# Patient Record
Sex: MALE | Race: WHITE | NOT HISPANIC OR LATINO | Employment: OTHER | URBAN - METROPOLITAN AREA
[De-identification: names, ages, dates, MRNs, and addresses within clinical notes are randomized per-mention and may not be internally consistent; named-entity substitution may affect disease eponyms.]

---

## 2017-07-17 RX ORDER — ATENOLOL 50 MG/1
TABLET ORAL
Qty: 90 TABLET | Refills: 1 | Status: SHIPPED | OUTPATIENT
Start: 2017-07-17 | End: 2017-10-16

## 2017-07-31 RX ORDER — ATENOLOL 50 MG/1
TABLET ORAL
Qty: 90 TABLET | Refills: 1 | Status: SHIPPED | OUTPATIENT
Start: 2017-07-31 | End: 2017-10-13 | Stop reason: SDUPTHER

## 2017-09-27 ENCOUNTER — TELEPHONE (OUTPATIENT)
Dept: FAMILY MEDICINE CLINIC | Facility: CLINIC | Age: 62
End: 2017-09-27

## 2017-09-27 DIAGNOSIS — G47.30 SLEEP APNEA, UNSPECIFIED TYPE: Primary | ICD-10-CM

## 2017-09-27 NOTE — TELEPHONE ENCOUNTER
Patient left message stating he was given a CPAP machine last year through [facility]. He has been snoring while sleeping and when he called [facility] they said his pressure settings were probably too low and that gerhard needs to send an order to [facility] to increase the pressure settings

## 2017-10-13 ENCOUNTER — HOSPITAL ENCOUNTER (OUTPATIENT)
Dept: CARDIOLOGY | Facility: HOSPITAL | Age: 62
Discharge: HOME | End: 2017-10-13

## 2017-10-13 ENCOUNTER — HOSPITAL ENCOUNTER (OUTPATIENT)
Dept: CARDIOLOGY | Facility: HOSPITAL | Age: 62
Discharge: HOME | End: 2017-10-13
Admitting: NURSE PRACTITIONER

## 2017-10-13 ENCOUNTER — OFFICE VISIT (OUTPATIENT)
Dept: FAMILY MEDICINE CLINIC | Facility: CLINIC | Age: 62
End: 2017-10-13

## 2017-10-13 VITALS
HEART RATE: 106 BPM | SYSTOLIC BLOOD PRESSURE: 118 MMHG | BODY MASS INDEX: 39.55 KG/M2 | WEIGHT: 292 LBS | OXYGEN SATURATION: 98 % | DIASTOLIC BLOOD PRESSURE: 70 MMHG | HEIGHT: 72 IN

## 2017-10-13 VITALS
TEMPERATURE: 98.2 F | HEART RATE: 71 BPM | WEIGHT: 292 LBS | BODY MASS INDEX: 39.55 KG/M2 | HEIGHT: 72 IN | RESPIRATION RATE: 14 BRPM | SYSTOLIC BLOOD PRESSURE: 132 MMHG | DIASTOLIC BLOOD PRESSURE: 74 MMHG | OXYGEN SATURATION: 100 %

## 2017-10-13 DIAGNOSIS — I48.91 ATRIAL FIBRILLATION, UNSPECIFIED TYPE (HCC): ICD-10-CM

## 2017-10-13 DIAGNOSIS — R94.31 ABNORMAL EKG: ICD-10-CM

## 2017-10-13 DIAGNOSIS — R06.02 SHORTNESS OF BREATH: ICD-10-CM

## 2017-10-13 DIAGNOSIS — G47.33 OSA (OBSTRUCTIVE SLEEP APNEA): ICD-10-CM

## 2017-10-13 DIAGNOSIS — I48.91 ATRIAL FIBRILLATION, UNSPECIFIED TYPE (HCC): Primary | ICD-10-CM

## 2017-10-13 DIAGNOSIS — R06.02 SOB (SHORTNESS OF BREATH): Primary | ICD-10-CM

## 2017-10-13 DIAGNOSIS — R42 DIZZINESS: ICD-10-CM

## 2017-10-13 DIAGNOSIS — R11.0 NAUSEA: ICD-10-CM

## 2017-10-13 DIAGNOSIS — E66.09 CLASS 2 OBESITY DUE TO EXCESS CALORIES WITHOUT SERIOUS COMORBIDITY WITH BODY MASS INDEX (BMI) OF 39.0 TO 39.9 IN ADULT: ICD-10-CM

## 2017-10-13 PROBLEM — M54.12 CERVICAL RADICULOPATHY: Status: ACTIVE | Noted: 2017-05-20

## 2017-10-13 PROBLEM — M50.30 DDD (DEGENERATIVE DISC DISEASE), CERVICAL: Status: ACTIVE | Noted: 2017-07-07

## 2017-10-13 PROBLEM — M48.02 CERVICAL STENOSIS OF SPINE: Status: ACTIVE | Noted: 2017-05-24

## 2017-10-13 LAB
ALBUMIN SERPL-MCNC: 4.1 G/DL (ref 3.5–5.2)
ALBUMIN/GLOB SERPL: 1.3 G/DL
ALP SERPL-CCNC: 84 U/L (ref 39–117)
ALT SERPL W P-5'-P-CCNC: 98 U/L (ref 1–41)
ANION GAP SERPL CALCULATED.3IONS-SCNC: 12.2 MMOL/L
ASCENDING AORTA: 3.8 CM
AST SERPL-CCNC: 39 U/L (ref 1–40)
BASOPHILS # BLD AUTO: 0.15 10*3/MM3 (ref 0–0.2)
BASOPHILS NFR BLD AUTO: 1.9 % (ref 0–1.5)
BH CV ECHO MEAS - ACS: 2.3 CM
BH CV ECHO MEAS - AI DEC SLOPE: 182.4 CM/SEC^2
BH CV ECHO MEAS - AI MAX PG: 32.2 MMHG
BH CV ECHO MEAS - AI MAX VEL: 283.7 CM/SEC
BH CV ECHO MEAS - AI P1/2T: 455.5 MSEC
BH CV ECHO MEAS - AO MAX PG (FULL): 6.5 MMHG
BH CV ECHO MEAS - AO MAX PG: 9.2 MMHG
BH CV ECHO MEAS - AO MEAN PG (FULL): 3.5 MMHG
BH CV ECHO MEAS - AO MEAN PG: 5 MMHG
BH CV ECHO MEAS - AO ROOT AREA (BSA CORRECTED): 1.3
BH CV ECHO MEAS - AO ROOT AREA: 7.8 CM^2
BH CV ECHO MEAS - AO ROOT DIAM: 3.1 CM
BH CV ECHO MEAS - AO V2 MAX: 152 CM/SEC
BH CV ECHO MEAS - AO V2 MEAN: 107.3 CM/SEC
BH CV ECHO MEAS - AO V2 VTI: 28.3 CM
BH CV ECHO MEAS - AVA(I,A): 2.4 CM^2
BH CV ECHO MEAS - AVA(I,D): 2.4 CM^2
BH CV ECHO MEAS - AVA(V,A): 2.2 CM^2
BH CV ECHO MEAS - AVA(V,D): 2.2 CM^2
BH CV ECHO MEAS - BSA(HAYCOCK): 2.6 M^2
BH CV ECHO MEAS - BSA: 2.5 M^2
BH CV ECHO MEAS - BZI_BMI: 39.6 KILOGRAMS/M^2
BH CV ECHO MEAS - BZI_METRIC_HEIGHT: 182.9 CM
BH CV ECHO MEAS - BZI_METRIC_WEIGHT: 132.5 KG
BH CV ECHO MEAS - CONTRAST EF (2CH): 59.9 ML/M^2
BH CV ECHO MEAS - CONTRAST EF 4CH: 62 ML/M^2
BH CV ECHO MEAS - EDV(MOD-SP2): 147 ML
BH CV ECHO MEAS - EDV(MOD-SP4): 150 ML
BH CV ECHO MEAS - EDV(TEICH): 161 ML
BH CV ECHO MEAS - EF(CUBED): 59.2 %
BH CV ECHO MEAS - EF(MOD-SP2): 59.9 %
BH CV ECHO MEAS - EF(MOD-SP4): 62 %
BH CV ECHO MEAS - EF(TEICH): 50.1 %
BH CV ECHO MEAS - ESV(MOD-SP2): 59 ML
BH CV ECHO MEAS - ESV(MOD-SP4): 57 ML
BH CV ECHO MEAS - ESV(TEICH): 80.4 ML
BH CV ECHO MEAS - FS: 25.8 %
BH CV ECHO MEAS - IVS/LVPW: 1.1
BH CV ECHO MEAS - IVSD: 1.5 CM
BH CV ECHO MEAS - LAT PEAK E' VEL: 10 CM/SEC
BH CV ECHO MEAS - LV DIASTOLIC VOL/BSA (35-75): 59.9 ML/M^2
BH CV ECHO MEAS - LV MASS(C)D: 378 GRAMS
BH CV ECHO MEAS - LV MASS(C)DI: 151.1 GRAMS/M^2
BH CV ECHO MEAS - LV MAX PG: 2.8 MMHG
BH CV ECHO MEAS - LV MEAN PG: 1.5 MMHG
BH CV ECHO MEAS - LV SYSTOLIC VOL/BSA (12-30): 22.8 ML/M^2
BH CV ECHO MEAS - LV V1 MAX: 82.9 CM/SEC
BH CV ECHO MEAS - LV V1 MEAN: 57.8 CM/SEC
BH CV ECHO MEAS - LV V1 VTI: 17.3 CM
BH CV ECHO MEAS - LVIDD: 5.7 CM
BH CV ECHO MEAS - LVIDS: 4.2 CM
BH CV ECHO MEAS - LVLD AP2: 9 CM
BH CV ECHO MEAS - LVLD AP4: 8.3 CM
BH CV ECHO MEAS - LVLS AP2: 7.7 CM
BH CV ECHO MEAS - LVLS AP4: 7.1 CM
BH CV ECHO MEAS - LVOT AREA (M): 4.2 CM^2
BH CV ECHO MEAS - LVOT AREA: 4 CM^2
BH CV ECHO MEAS - LVOT DIAM: 2.3 CM
BH CV ECHO MEAS - LVPWD: 1.4 CM
BH CV ECHO MEAS - MED PEAK E' VEL: 7 CM/SEC
BH CV ECHO MEAS - MR MAX PG: 49.1 MMHG
BH CV ECHO MEAS - MR MAX VEL: 350.2 CM/SEC
BH CV ECHO MEAS - MV DEC SLOPE: 1254 CM/SEC^2
BH CV ECHO MEAS - MV DEC TIME: 0.11 SEC
BH CV ECHO MEAS - MV E MAX VEL: 126.1 CM/SEC
BH CV ECHO MEAS - MV MAX PG: 3.1 MMHG
BH CV ECHO MEAS - MV MEAN PG: 1.5 MMHG
BH CV ECHO MEAS - MV P1/2T MAX VEL: 127.1 CM/SEC
BH CV ECHO MEAS - MV P1/2T: 29.7 MSEC
BH CV ECHO MEAS - MV V2 MAX: 88.2 CM/SEC
BH CV ECHO MEAS - MV V2 MEAN: 56.9 CM/SEC
BH CV ECHO MEAS - MV V2 VTI: 27.4 CM
BH CV ECHO MEAS - MVA P1/2T LCG: 1.7 CM^2
BH CV ECHO MEAS - MVA(P1/2T): 7.4 CM^2
BH CV ECHO MEAS - MVA(VTI): 2.5 CM^2
BH CV ECHO MEAS - PA ACC TIME: 0.08 SEC
BH CV ECHO MEAS - PA MAX PG (FULL): 4.6 MMHG
BH CV ECHO MEAS - PA MAX PG: 5.8 MMHG
BH CV ECHO MEAS - PA PR(ACCEL): 41 MMHG
BH CV ECHO MEAS - PA V2 MAX: 120.8 CM/SEC
BH CV ECHO MEAS - PULM A REVS DUR: 0.08 SEC
BH CV ECHO MEAS - PULM A REVS VEL: 19.8 CM/SEC
BH CV ECHO MEAS - PULM DIAS VEL: 39.2 CM/SEC
BH CV ECHO MEAS - PULM S/D: 1.3
BH CV ECHO MEAS - PULM SYS VEL: 50.6 CM/SEC
BH CV ECHO MEAS - PVA(V,A): 2 CM^2
BH CV ECHO MEAS - PVA(V,D): 2 CM^2
BH CV ECHO MEAS - QP/QS: 0.7
BH CV ECHO MEAS - RV MAX PG: 1.2 MMHG
BH CV ECHO MEAS - RV MEAN PG: 0.57 MMHG
BH CV ECHO MEAS - RV V1 MAX: 54.8 CM/SEC
BH CV ECHO MEAS - RV V1 MEAN: 35 CM/SEC
BH CV ECHO MEAS - RV V1 VTI: 10.7 CM
BH CV ECHO MEAS - RVOT AREA: 4.5 CM^2
BH CV ECHO MEAS - RVOT DIAM: 2.4 CM
BH CV ECHO MEAS - SI(AO): 88 ML/M^2
BH CV ECHO MEAS - SI(CUBED): 44.1 ML/M^2
BH CV ECHO MEAS - SI(LVOT): 27.5 ML/M^2
BH CV ECHO MEAS - SI(MOD-SP2): 35.2 ML/M^2
BH CV ECHO MEAS - SI(MOD-SP4): 37.2 ML/M^2
BH CV ECHO MEAS - SI(TEICH): 32.2 ML/M^2
BH CV ECHO MEAS - SUP REN AO DIAM: 2 CM
BH CV ECHO MEAS - SV(AO): 220.2 ML
BH CV ECHO MEAS - SV(CUBED): 110.5 ML
BH CV ECHO MEAS - SV(LVOT): 68.8 ML
BH CV ECHO MEAS - SV(MOD-SP2): 88 ML
BH CV ECHO MEAS - SV(MOD-SP4): 93 ML
BH CV ECHO MEAS - SV(RVOT): 47.8 ML
BH CV ECHO MEAS - SV(TEICH): 80.7 ML
BH CV ECHO MEAS - TAPSE (>1.6): 1.9 CM2
BH CV ECHO MEAS - TR MAX VEL: 176.5 CM/SEC
BH CV XLRA - RV BASE: 3.1 CM
BH CV XLRA - TDI S': 13 CM/SEC
BILIRUB SERPL-MCNC: 0.5 MG/DL (ref 0.1–1.2)
BUN BLD-MCNC: 20 MG/DL (ref 8–23)
BUN/CREAT SERPL: 16.9 (ref 7–25)
CALCIUM SPEC-SCNC: 10.1 MG/DL (ref 8.6–10.5)
CHLORIDE SERPL-SCNC: 102 MMOL/L (ref 98–107)
CO2 SERPL-SCNC: 24.8 MMOL/L (ref 22–29)
CREAT BLD-MCNC: 1.18 MG/DL (ref 0.76–1.27)
D DIMER PPP FEU-MCNC: 0.31 MCGFEU/ML (ref 0–0.49)
DEPRECATED RDW RBC AUTO: 44.3 FL (ref 37–54)
E/E' RATIO: 15.5
EOSINOPHIL # BLD AUTO: 0.18 10*3/MM3 (ref 0–0.7)
EOSINOPHIL NFR BLD AUTO: 2.3 % (ref 0.3–6.2)
ERYTHROCYTE [DISTWIDTH] IN BLOOD BY AUTOMATED COUNT: 13.1 % (ref 11.5–14.5)
GFR SERPL CREATININE-BSD FRML MDRD: 62 ML/MIN/1.73
GLOBULIN UR ELPH-MCNC: 3.2 GM/DL
GLUCOSE BLD-MCNC: 112 MG/DL (ref 65–99)
HCT VFR BLD AUTO: 48.3 % (ref 40.4–52.2)
HGB BLD-MCNC: 16 G/DL (ref 13.7–17.6)
IMM GRANULOCYTES # BLD: 0.02 10*3/MM3 (ref 0–0.03)
IMM GRANULOCYTES NFR BLD: 0.3 % (ref 0–0.5)
LEFT ATRIUM VOLUME INDEX: 19 ML/M2
LV EF 2D ECHO EST: 62 %
LYMPHOCYTES # BLD AUTO: 2.99 10*3/MM3 (ref 0.9–4.8)
LYMPHOCYTES NFR BLD AUTO: 37.6 % (ref 19.6–45.3)
MCH RBC QN AUTO: 30.8 PG (ref 27–32.7)
MCHC RBC AUTO-ENTMCNC: 33.1 G/DL (ref 32.6–36.4)
MCV RBC AUTO: 92.9 FL (ref 79.8–96.2)
MONOCYTES # BLD AUTO: 0.68 10*3/MM3 (ref 0.2–1.2)
MONOCYTES NFR BLD AUTO: 8.6 % (ref 5–12)
NEUTROPHILS # BLD AUTO: 3.93 10*3/MM3 (ref 1.9–8.1)
NEUTROPHILS NFR BLD AUTO: 49.3 % (ref 42.7–76)
NT-PROBNP SERPL-MCNC: 1281 PG/ML (ref 0–900)
PLATELET # BLD AUTO: 203 10*3/MM3 (ref 140–500)
PMV BLD AUTO: 11.9 FL (ref 6–12)
POTASSIUM BLD-SCNC: 4.5 MMOL/L (ref 3.5–5.2)
PROT SERPL-MCNC: 7.3 G/DL (ref 6–8.5)
RBC # BLD AUTO: 5.2 10*6/MM3 (ref 4.6–6)
SINUS: 2.5 CM
SODIUM BLD-SCNC: 139 MMOL/L (ref 136–145)
STJ: 2.7 CM
T-UPTAKE NFR SERPL: 1.02 TBI (ref 0.8–1.3)
T4 FREE SERPL-MCNC: 1.09 NG/DL (ref 0.93–1.7)
T4 SERPL-MCNC: 5.97 MCG/DL (ref 4.5–11.7)
TROPONIN T SERPL-MCNC: <0.01 NG/ML (ref 0–0.03)
TSH SERPL DL<=0.05 MIU/L-ACNC: 2.16 MIU/ML (ref 0.27–4.2)
WBC NRBC COR # BLD: 7.95 10*3/MM3 (ref 4.5–10.7)

## 2017-10-13 PROCEDURE — 85025 COMPLETE CBC W/AUTO DIFF WBC: CPT | Performed by: NURSE PRACTITIONER

## 2017-10-13 PROCEDURE — 99214 OFFICE O/P EST MOD 30 MIN: CPT | Performed by: NURSE PRACTITIONER

## 2017-10-13 PROCEDURE — 96374 THER/PROPH/DIAG INJ IV PUSH: CPT

## 2017-10-13 PROCEDURE — 96376 TX/PRO/DX INJ SAME DRUG ADON: CPT

## 2017-10-13 PROCEDURE — 84484 ASSAY OF TROPONIN QUANT: CPT | Performed by: NURSE PRACTITIONER

## 2017-10-13 PROCEDURE — 80053 COMPREHEN METABOLIC PANEL: CPT | Performed by: NURSE PRACTITIONER

## 2017-10-13 PROCEDURE — 84436 ASSAY OF TOTAL THYROXINE: CPT | Performed by: NURSE PRACTITIONER

## 2017-10-13 PROCEDURE — 93000 ELECTROCARDIOGRAM COMPLETE: CPT | Performed by: NURSE PRACTITIONER

## 2017-10-13 PROCEDURE — 93306 TTE W/DOPPLER COMPLETE: CPT

## 2017-10-13 PROCEDURE — 93005 ELECTROCARDIOGRAM TRACING: CPT | Performed by: NURSE PRACTITIONER

## 2017-10-13 PROCEDURE — 94760 N-INVAS EAR/PLS OXIMETRY 1: CPT

## 2017-10-13 PROCEDURE — 25010000002 FUROSEMIDE PER 20 MG: Performed by: NURSE PRACTITIONER

## 2017-10-13 PROCEDURE — 84479 ASSAY OF THYROID (T3 OR T4): CPT | Performed by: NURSE PRACTITIONER

## 2017-10-13 PROCEDURE — 84439 ASSAY OF FREE THYROXINE: CPT | Performed by: NURSE PRACTITIONER

## 2017-10-13 PROCEDURE — 84443 ASSAY THYROID STIM HORMONE: CPT | Performed by: NURSE PRACTITIONER

## 2017-10-13 PROCEDURE — 93306 TTE W/DOPPLER COMPLETE: CPT | Performed by: INTERNAL MEDICINE

## 2017-10-13 PROCEDURE — 83880 ASSAY OF NATRIURETIC PEPTIDE: CPT | Performed by: NURSE PRACTITIONER

## 2017-10-13 PROCEDURE — 85379 FIBRIN DEGRADATION QUANT: CPT | Performed by: NURSE PRACTITIONER

## 2017-10-13 PROCEDURE — 36415 COLL VENOUS BLD VENIPUNCTURE: CPT

## 2017-10-13 PROCEDURE — 99205 OFFICE O/P NEW HI 60 MIN: CPT | Performed by: NURSE PRACTITIONER

## 2017-10-13 PROCEDURE — 93010 ELECTROCARDIOGRAM REPORT: CPT | Performed by: NURSE PRACTITIONER

## 2017-10-13 PROCEDURE — 25010000002 PERFLUTREN (DEFINITY) 8.476 MG IN SODIUM CHLORIDE 0.9 % 10 ML INJECTION: Performed by: NURSE PRACTITIONER

## 2017-10-13 RX ORDER — FUROSEMIDE 20 MG/1
20 TABLET ORAL DAILY
Qty: 30 TABLET | Refills: 1 | Status: SHIPPED | OUTPATIENT
Start: 2017-10-13 | End: 2017-11-10

## 2017-10-13 RX ORDER — SODIUM CHLORIDE 0.9 % (FLUSH) 0.9 %
10 SYRINGE (ML) INJECTION AS NEEDED
Status: DISCONTINUED | OUTPATIENT
Start: 2017-10-13 | End: 2017-10-13

## 2017-10-13 RX ORDER — DILTIAZEM HYDROCHLORIDE 240 MG/1
240 CAPSULE, COATED, EXTENDED RELEASE ORAL DAILY
Qty: 30 CAPSULE | Refills: 1 | Status: SHIPPED | OUTPATIENT
Start: 2017-10-13 | End: 2017-11-10 | Stop reason: SDUPTHER

## 2017-10-13 RX ORDER — NITROGLYCERIN 0.4 MG/1
0.4 TABLET SUBLINGUAL
Status: DISCONTINUED | OUTPATIENT
Start: 2017-10-13 | End: 2017-10-13

## 2017-10-13 RX ORDER — DILTIAZEM HYDROCHLORIDE 5 MG/ML
10 INJECTION INTRAVENOUS ONCE
Status: COMPLETED | OUTPATIENT
Start: 2017-10-13 | End: 2017-10-13

## 2017-10-13 RX ORDER — FUROSEMIDE 10 MG/ML
20 INJECTION INTRAMUSCULAR; INTRAVENOUS ONCE
Status: COMPLETED | OUTPATIENT
Start: 2017-10-13 | End: 2017-10-13

## 2017-10-13 RX ADMIN — PERFLUTREN 1.5 ML: 6.52 INJECTION, SUSPENSION INTRAVENOUS at 14:13

## 2017-10-13 RX ADMIN — METOROPROLOL TARTRATE 5 MG: 5 INJECTION, SOLUTION INTRAVENOUS at 13:07

## 2017-10-13 RX ADMIN — FUROSEMIDE 20 MG: 10 INJECTION, SOLUTION INTRAMUSCULAR; INTRAVENOUS at 15:03

## 2017-10-13 RX ADMIN — DILTIAZEM HYDROCHLORIDE 10 MG: 5 INJECTION INTRAVENOUS at 15:20

## 2017-10-13 NOTE — PROGRESS NOTES
"Subjective   Hoang So is a 62 y.o. male.     History of Present Illness   Patient presenting to the office today with a 2 day onset of shortness of breath and nausea.  He is not experiencing any chest pain that he notices anytime he walks up hill or even walks on flat land for about 10-15 feet he's getting extremely short of breath.  He has a large cardiac history in his family including multiple heart attacks in his father and uncles.  Both of his parents have A. fib.  He is nauseated today and the nausea has been coming and going.  He's also been dizzy when he moves from sitting to standing or laying to standing.  The dizziness lasts for a few seconds and then I will go away.  The following portions of the patient's history were reviewed and updated as appropriate: allergies, current medications, past social history and problem list.    Review of Systems   Respiratory: Positive for shortness of breath.    Neurological: Positive for dizziness and headaches.   All other systems reviewed and are negative.      Objective   /74 (BP Location: Left arm, Patient Position: Sitting, Cuff Size: Adult)  Pulse 71  Temp 98.2 °F (36.8 °C) (Oral)   Resp 14  Ht 72\" (182.9 cm)  Wt 292 lb (132 kg)  SpO2 100%  BMI 39.6 kg/m2  Physical Exam   Constitutional: He is oriented to person, place, and time. Vital signs are normal. He appears well-developed and well-nourished. No distress.   HENT:   Head: Normocephalic.   Cardiovascular: Normal heart sounds.  An irregularly irregular rhythm present. Tachycardia present.    Pulmonary/Chest: Effort normal and breath sounds normal.   Neurological: He is alert and oriented to person, place, and time. Gait normal.   Psychiatric: He has a normal mood and affect. His behavior is normal. Judgment and thought content normal.   Vitals reviewed.    ECG 12 Lead  Date/Time: 10/13/2017 10:26 AM  Performed by: OBEY SKELTON  Authorized by: OBEY SKELTON   Comparison: not compared with " previous ECG   Rhythm: atrial fibrillation  Rate: tachycardic  Conduction: conduction normal  ST Segments: ST segments normal  T Waves: T waves normal  QRS axis: normal  Other: no other findings  Clinical impression: abnormal ECG              Assessment/Plan   Problem List Items Addressed This Visit        Cardiovascular and Mediastinum    Atrial fibrillation       Respiratory    SOB (shortness of breath) - Primary    Relevant Orders    ECG 12 Lead       Digestive    Nausea       Other    Dizziness        Patient has been transferred over by a car he was here in the clinic with his wife and daughter to the chest pain clinic for new onset of A. fib.

## 2017-10-16 PROBLEM — G47.33 OSA (OBSTRUCTIVE SLEEP APNEA): Status: ACTIVE | Noted: 2017-10-16

## 2017-10-16 NOTE — PROGRESS NOTES
Date of Office Visit: 10/13/2017  Encounter Provider: ASAEL Ritchie  Place of Service: [facility]  Patient Name: Hoang So  :1955    Chief Complaint   Patient presents with   • Shortness of Breath     for the last 2days has been lightheaded when he stands up and soa. Getting worse each day.    :     HPI: Hoang So is a 62 y.o. male comes in today for Complaints of dizziness and shortness of breath.  He was referred to the [facility]  By his primary care provider, ASAEL Mai, for his symptoms.  He has a history of hypertension and hyperlipidemia along with sleep apnea.    Over the past 2 days, the patient has not felt great.  He is complaining of nausea.  His nausea is worse with activity.  While at rest his nausea is improved.  He has not vomited.  Over the past day and a half, he is very dizzy when standing.  Very frequent in the last couple of days.  May have been feeling this more over the past 6 months but not as extreme as today.  He has been walking on the treadmill since around .  He was doing this to improve his functional capacity that feels like this has not helped him at all.  Over the past day he's also been more short of breath.  Short distances has caused him to be short of breath.  He has to stop to catch his breath and feels like he can't proceed.  He denies chest pain or chest pressure.  Denies fatigue prior than the past 2 days.  Denies syncope or presyncope.  He uses a CPAP.  This was prescribed by his primary care physician.  They had recently referred him to a sleep medicine physician as his CPAP was not working correctly.    He has been on atenolol for some time for his blood pressure.    Past Medical History:   Diagnosis Date   • Allergic    • Hyperlipidemia    • Hypertension    • Low back pain    • Sleep apnea        Past Surgical History:   Procedure Laterality Date   • CERVICAL DISC  SURGERY     • SHOULDER SURGERY             Review of Systems   Constitution: Positive for malaise/fatigue. Negative for fever.   HENT: Negative for ear pain, hearing loss, nosebleeds and sore throat.    Eyes: Negative for double vision, pain, vision loss in left eye, vision loss in right eye and visual disturbance.   Cardiovascular: Positive for dyspnea on exertion. Negative for claudication and leg swelling.   Respiratory: Negative for cough, snoring and wheezing.    Endocrine: Negative for cold intolerance, heat intolerance and polyuria.   Skin: Negative for color change, itching and rash.   Musculoskeletal: Negative for joint pain, joint swelling and muscle cramps.   Gastrointestinal: Positive for nausea. Negative for abdominal pain, diarrhea, melena and vomiting.   Genitourinary: Negative for bladder incontinence and hematuria.   Neurological: Negative for excessive daytime sleepiness, dizziness, light-headedness, paresthesias and seizures.   Psychiatric/Behavioral: Negative for depression. The patient is not nervous/anxious.    All other systems reviewed and are negative.    All other systems reviewed and are negative    No Known Allergies    All aspects of family and social history reviewed.       Current Outpatient Prescriptions:   •  Calcium Citrate-Vitamin D (CALCIUM + D PO), Take  by mouth., Disp: , Rfl:   •  CRESTOR 10 MG tablet, TAKE ONE TABLET BY MOUTH EVERY NIGHT AT BEDTIME, Disp: 90 tablet, Rfl: 2  •  diphenhydrAMINE (BENADRYL) 25 MG tablet, Take  by mouth., Disp: , Rfl:   •  diltiaZEM CD (CARTIA XT) 240 MG 24 hr capsule, Take 1 capsule by mouth Daily., Disp: 30 capsule, Rfl: 1  •  furosemide (LASIX) 20 MG tablet, Take 1 tablet by mouth Daily., Disp: 30 tablet, Rfl: 1  •  rivaroxaban (XARELTO) 20 MG tablet, Take 1 tablet by mouth Daily., Disp: 30 tablet, Rfl: 5     Objective:     Vitals:    10/13/17 1114 10/13/17 1115 10/13/17 1347 10/13/17 1520   BP: 130/88 124/80 124/72 118/70   BP Location: Left  "arm Right arm     Patient Position: Sitting Sitting Sitting    Pulse:  (!) 132  106   SpO2: 98%      Weight: 292 lb (132 kg)      Height: 72\" (182.9 cm)        Body mass index is 39.6 kg/(m^2).    PHYSICAL EXAM:  Physical Exam   Constitutional: He is oriented to person, place, and time. He appears well-developed and well-nourished.   HENT:   Head: Normocephalic and atraumatic.   Neck: Neck supple. No JVD present.   Cardiovascular: Normal rate, normal heart sounds and intact distal pulses.  An irregularly irregular rhythm present.   Pulses:       Carotid pulses are 2+ on the right side, and 2+ on the left side.       Radial pulses are 2+ on the right side, and 2+ on the left side.        Dorsalis pedis pulses are 2+ on the right side, and 2+ on the left side.   Pulmonary/Chest: Effort normal and breath sounds normal. No accessory muscle usage. No respiratory distress. He has no rales.   Abdominal: Soft. Normal appearance and bowel sounds are normal. There is no tenderness.   Musculoskeletal: Normal range of motion. He exhibits no edema.   Neurological: He is alert and oriented to person, place, and time.   Skin: Skin is warm, dry and intact. He is not diaphoretic.   Psychiatric: He has a normal mood and affect. His speech is normal and behavior is normal. Judgment and thought content normal. Cognition and memory are normal.         ECG 12 Lead  Date/Time: 10/13/2017 12:09 PM  Performed by: JOSELYN RHODES  Authorized by: JOSELYN RHODES   Comparison: compared with previous ECG from 12/11/2015  Comparison to previous ECG: Previously NSR  Rhythm: atrial fibrillation  Rate: tachycardic  BPM: 153  ST Segments: ST segments normal  QRS axis: normal  Clinical impression: abnormal ECG  Comments: Indication: atrial fib              Results from last 7 days  Lab Units 10/13/17  1124   SODIUM mmol/L 139   POTASSIUM mmol/L 4.5   CHLORIDE mmol/L 102   CO2 mmol/L 24.8   BUN mg/dL 20   CREATININE mg/dL 1.18   CALCIUM mg/dL 10.1 "   BILIRUBIN mg/dL 0.5   ALK PHOS U/L 84   ALT (SGPT) U/L 98*   AST (SGOT) U/L 39   GLUCOSE mg/dL 112*       Results from last 7 days  Lab Units 10/13/17  1124   WBC 10*3/mm3 7.95   HEMOGLOBIN g/dL 16.0   HEMATOCRIT % 48.3   PLATELETS 10*3/mm3 203       Results from last 7 days  Lab Units 10/13/17  1124   TSH mIU/mL 2.160   FREE T4 ng/dL 1.09       Lab Results   Component Value Date    TROPONINT <0.010 10/13/2017   ProBNP 1281, DDimer WNl      Assessment:       Diagnosis Plan   1. Atrial fibrillation, unspecified type  Adult Transthoracic Echo Complete W/ Cont if Necessary Per Protocol    Ambulatory Referral to Sleep Medicine   2. Abnormal EKG  CBC & Differential    Comprehensive Metabolic Panel    Troponin T    D-dimer    proBNP    ECG 12 Lead    Thyroid Panel With TSH    T4, Free    CBC & Differential    Comprehensive Metabolic Panel    Troponin T    D-dimer    proBNP    ECG 12 Lead    T4, Free    T4, Free    Troponin T    Troponin T    D-dimer    D-dimer    proBNP    proBNP    CBC Auto Differential   3. Shortness of breath  CBC & Differential    Comprehensive Metabolic Panel    Troponin T    D-dimer    proBNP    ECG 12 Lead    Thyroid Panel With TSH    T4, Free    CBC & Differential    Comprehensive Metabolic Panel    Troponin T    D-dimer    proBNP    ECG 12 Lead    T4, Free    T4, Free    Troponin T    Troponin T    D-dimer    D-dimer    proBNP    proBNP    CBC Auto Differential    Adult Transthoracic Echo Complete W/ Cont if Necessary Per Protocol    Ambulatory Referral to Sleep Medicine   4. Dizziness  CBC & Differential    Comprehensive Metabolic Panel    Troponin T    D-dimer    proBNP    ECG 12 Lead    Troponin T    Troponin T    D-dimer    D-dimer    proBNP    proBNP        Orders Placed This Encounter   Procedures   • Comprehensive Metabolic Panel   • Troponin T     Standing Status:   Future     Standing Expiration Date:   10/13/2018   • D-dimer     Standing Status:   Future     Standing Expiration Date:    10/13/2018   • proBNP     Standing Status:   Future     Standing Expiration Date:   10/13/2018   • Thyroid Panel With TSH   • T4, Free     Standing Status:   Future     Number of Occurrences:   1     Standing Expiration Date:   10/13/2018   • Comprehensive Metabolic Panel   • Troponin T     Standing Status:   Future     Number of Occurrences:   1     Standing Expiration Date:   10/13/2018   • D-dimer     Standing Status:   Future     Number of Occurrences:   1     Standing Expiration Date:   10/13/2018   • proBNP     Standing Status:   Future     Number of Occurrences:   1     Standing Expiration Date:   10/13/2018   • T4, Free     Standing Status:   Standing     Number of Occurrences:   1   • Troponin T     Standing Status:   Standing     Number of Occurrences:   1   • D-dimer     Standing Status:   Standing     Number of Occurrences:   1   • proBNP     Standing Status:   Standing     Number of Occurrences:   1   • CBC Auto Differential   • Ambulatory Referral to Sleep Medicine     Referral Priority:   Routine     Referral Type:   Consultation     Referral Reason:   Specialty Services Required     Referred to Provider:   Corby Foss MD     Requested Specialty:   Sleep Medicine     Number of Visits Requested:   1   • ECG 12 Lead     If not done by PCP     Order Specific Question:   Reason for Exam:     Answer:   Chest Pain Admission   • ECG 12 Lead     If not done by PCP     Order Specific Question:   Reason for Exam:     Answer:   Chest Pain Admission   • Adult Transthoracic Echo Complete W/ Cont if Necessary Per Protocol     Standing Status:   Future     Number of Occurrences:   1     Order Specific Question:   Reason for exam?     Answer:   Arrhythmia     Order Specific Question:   Reason for exam?     Answer:   Dyspnea     Order Specific Question:   Arrhythmias specification?     Answer:   AFib   • CBC & Differential     Order Specific Question:   Manual Differential     Answer:   No   • CBC &  Differential     Order Specific Question:   Manual Differential     Answer:   No                Plan:       1. New onset atrial fibrillation-patient presents today with shortness of breath and fatigue.  Found to have new onset atrial fibrillation.  An echocardiogram performed shows a LV wall thickness consistent with moderate asymmetric hypertrophy, EF normal and mild mitral regurgitation.  We attempted to slow his heart rate down with 5 mg of metoprolol IV.  On the monitor, he has atrial fibrillation and very short runs of about 4-5 beats and will have a regular beat and have more atrial fibrillation.  We gave him 20 mg of furosemide IV to help with his shortness of breath.  Patient had good urine output with this.  We also gave him diltiazem 10 mg IV to help with slowing of his heart rate.  At the time of discharge, he was still having short runs of atrial fibrillation.  We stopped the patient's atenolol.  Started diltiazem  mg daily.  Anticoagulate with Xarelto 20 mg daily.  Will also start furosemide 20 mg daily to assist with his shortness of breath.  He will come into the office on Tuesday for repeat blood pressure and EKG and then follow-up with Dr. Aguillon in one month. Will try rate control at this time and discuss rhythm control if needed after anticoagulated for 4 weeks    Atrial Fibrillation and Atrial Flutter  Assessment  • The patient has atrial fibrillation-initial episode  • This is non-valvular in etiology  • The patient's CHADS2-VASc score is 1  • A [id number] score of 1 is considered an intermediate risk for a thromboembolic event    Plan  • Continue in atrial fibrillation with rate control  • Continue rivaroxaban for antithrombotic therapy, bleeding issues discussed  • Continue beta blocker for rate control    2.  Sleep apnea-history of sleep apnea.  Mask not fitting well.  Needs to see sleep medicine.  May because of atrial fibrillation.    3.  Obesity-advised weight loss    4.   Hyperlipidemia-continue statin      Follow up in office next week. Care discussed with Dr. Aguillon.     As always, it has been a pleasure to participate in this patient's care.      Sincerely,      ASAEL Ritchie

## 2017-10-17 ENCOUNTER — CLINICAL SUPPORT (OUTPATIENT)
Dept: CARDIOLOGY | Facility: CLINIC | Age: 62
End: 2017-10-17

## 2017-10-17 DIAGNOSIS — I48.91 ATRIAL FIBRILLATION, UNSPECIFIED TYPE (HCC): Primary | ICD-10-CM

## 2017-10-17 PROCEDURE — 93000 ELECTROCARDIOGRAM COMPLETE: CPT | Performed by: INTERNAL MEDICINE

## 2017-10-17 NOTE — PROGRESS NOTES
Procedure     ECG 12 Lead  Date/Time: 10/17/2017 12:10 PM  Performed by: IRASEMA AGUILLON  Authorized by: IRASEMA AGUILLON   Comparison: compared with previous ECG from 10/13/2017  Similar to previous ECG  Rhythm: atrial fibrillation  BPM: 93  ST Segments: ST segments normal  T Waves: T waves normal  Clinical impression: abnormal ECG           Pt came in today for a BP/EKG check.  /80.  Per Dr. Aguillon, ekg ok, continue the same and follow up in one month.  Pt has an appt on 11/10/17 with Dr. Aguillon.  Pt understands verbally and will call back with any concerns./janelle

## 2017-10-19 ENCOUNTER — TELEPHONE (OUTPATIENT)
Dept: CARDIOLOGY | Facility: HOSPITAL | Age: 62
End: 2017-10-19

## 2017-10-30 ENCOUNTER — TELEPHONE (OUTPATIENT)
Dept: FAMILY MEDICINE CLINIC | Facility: CLINIC | Age: 62
End: 2017-10-30

## 2017-10-30 RX ORDER — PROMETHAZINE HYDROCHLORIDE AND CODEINE PHOSPHATE 6.25; 1 MG/5ML; MG/5ML
SYRUP ORAL
Qty: 240 ML | Refills: 0 | OUTPATIENT
Start: 2017-10-30 | End: 2017-11-10

## 2017-11-09 ENCOUNTER — OFFICE VISIT (OUTPATIENT)
Dept: FAMILY MEDICINE CLINIC | Facility: CLINIC | Age: 62
End: 2017-11-09

## 2017-11-09 VITALS
TEMPERATURE: 98 F | SYSTOLIC BLOOD PRESSURE: 136 MMHG | WEIGHT: 295 LBS | HEIGHT: 72 IN | OXYGEN SATURATION: 97 % | DIASTOLIC BLOOD PRESSURE: 84 MMHG | HEART RATE: 93 BPM | BODY MASS INDEX: 39.96 KG/M2

## 2017-11-09 DIAGNOSIS — J40 BRONCHITIS: Primary | ICD-10-CM

## 2017-11-09 DIAGNOSIS — R09.82 POST-NASAL DRIP: ICD-10-CM

## 2017-11-09 PROCEDURE — 99213 OFFICE O/P EST LOW 20 MIN: CPT | Performed by: NURSE PRACTITIONER

## 2017-11-09 PROCEDURE — 96372 THER/PROPH/DIAG INJ SC/IM: CPT | Performed by: NURSE PRACTITIONER

## 2017-11-09 RX ORDER — TRIAMCINOLONE ACETONIDE 40 MG/ML
40 INJECTION, SUSPENSION INTRA-ARTICULAR; INTRAMUSCULAR ONCE
Status: COMPLETED | OUTPATIENT
Start: 2017-11-09 | End: 2017-11-09

## 2017-11-09 RX ORDER — MONTELUKAST SODIUM 10 MG/1
10 TABLET ORAL NIGHTLY
Qty: 30 TABLET | Refills: 6 | Status: SHIPPED | OUTPATIENT
Start: 2017-11-09 | End: 2018-04-20 | Stop reason: HOSPADM

## 2017-11-09 RX ADMIN — TRIAMCINOLONE ACETONIDE 40 MG: 40 INJECTION, SUSPENSION INTRA-ARTICULAR; INTRAMUSCULAR at 11:21

## 2017-11-09 NOTE — PROGRESS NOTES
"Subjective   Hoang So is a 62 y.o. male.     History of Present Illness   Patient presenting to the office today with a persistent cough that he's had for the past 2 weeks.  He is currently using cough medication which is not working.  He does not have a fever no shortness of breath his cough is dry or he gets up clear mucus with it.  He's more aggravated with a cough and anything else.  He does feel like he has congestion in his head and slight drainage in his throat.  He is using no other medications for the cough.  The following portions of the patient's history were reviewed and updated as appropriate: allergies, current medications, past social history and problem list.    Review of Systems   HENT: Positive for congestion.    Respiratory: Positive for cough.    All other systems reviewed and are negative.      Objective   /84 (BP Location: Right arm, Patient Position: Sitting)  Pulse 93  Temp 98 °F (36.7 °C)  Ht 72\" (182.9 cm)  Wt 295 lb (134 kg)  SpO2 97%  BMI 40.01 kg/m2  Physical Exam   Constitutional: He is oriented to person, place, and time. Vital signs are normal. He appears well-developed and well-nourished. No distress.   HENT:   Head: Normocephalic.   Cardiovascular: Normal rate, regular rhythm and normal heart sounds.    Pulmonary/Chest: Effort normal and breath sounds normal.   Neurological: He is alert and oriented to person, place, and time. Gait normal.   Psychiatric: He has a normal mood and affect. His behavior is normal. Judgment and thought content normal.   Vitals reviewed.      Assessment/Plan   Problem List Items Addressed This Visit        Respiratory    Bronchitis - Primary    Relevant Medications    montelukast (SINGULAIR) 10 MG tablet    triamcinolone acetonide (KENALOG-40) injection 40 mg (Start on 11/9/2017 11:45 AM)       Other    Post-nasal drip    Relevant Medications    montelukast (SINGULAIR) 10 MG tablet    triamcinolone acetonide (KENALOG-40) injection 40 mg " (Start on 11/9/2017 11:45 AM)        Return to the office when necessary

## 2017-11-10 ENCOUNTER — OFFICE VISIT (OUTPATIENT)
Dept: CARDIOLOGY | Facility: CLINIC | Age: 62
End: 2017-11-10

## 2017-11-10 ENCOUNTER — HOSPITAL ENCOUNTER (OUTPATIENT)
Dept: GENERAL RADIOLOGY | Facility: HOSPITAL | Age: 62
Discharge: HOME | End: 2017-11-10
Attending: INTERNAL MEDICINE | Admitting: INTERNAL MEDICINE

## 2017-11-10 VITALS
WEIGHT: 297.8 LBS | SYSTOLIC BLOOD PRESSURE: 140 MMHG | HEIGHT: 72 IN | DIASTOLIC BLOOD PRESSURE: 90 MMHG | RESPIRATION RATE: 16 BRPM | HEART RATE: 82 BPM | BODY MASS INDEX: 40.34 KG/M2

## 2017-11-10 DIAGNOSIS — R05.9 COUGH: ICD-10-CM

## 2017-11-10 DIAGNOSIS — E78.2 MIXED HYPERLIPIDEMIA: ICD-10-CM

## 2017-11-10 DIAGNOSIS — I10 HYPERTENSION, UNSPECIFIED TYPE: ICD-10-CM

## 2017-11-10 DIAGNOSIS — I48.0 PAROXYSMAL ATRIAL FIBRILLATION (HCC): Primary | ICD-10-CM

## 2017-11-10 DIAGNOSIS — R06.09 DOE (DYSPNEA ON EXERTION): ICD-10-CM

## 2017-11-10 PROCEDURE — 71020 HC CHEST PA AND LATERAL: CPT

## 2017-11-10 PROCEDURE — 93000 ELECTROCARDIOGRAM COMPLETE: CPT | Performed by: INTERNAL MEDICINE

## 2017-11-10 PROCEDURE — 99214 OFFICE O/P EST MOD 30 MIN: CPT | Performed by: INTERNAL MEDICINE

## 2017-11-10 RX ORDER — DILTIAZEM HYDROCHLORIDE 360 MG/1
360 CAPSULE, EXTENDED RELEASE ORAL DAILY
Qty: 90 CAPSULE | Refills: 3 | Status: SHIPPED | OUTPATIENT
Start: 2017-11-10 | End: 2018-04-20 | Stop reason: HOSPADM

## 2017-11-10 NOTE — PROGRESS NOTES
PATIENTINFORMATION    Date of Office Visit: 11/10/2017  Encounter Provider: Niki Aguillon MD  Place of Service: [facility] CARDIOLOG[facility]  Patient Name: Hoang So  : 1955    Subjective:     Encounter Date:11/10/2017      Patient ID: Hoang So is a 62 y.o. male.      History of Present Illness    This is a nice gentleman who presented to the cardiac evaluation clinic in 10/2017.  He complained of dizziness and shortness of breath.  He was found to be having paroxysms of atrial fibrillation.  He was given IV Lasix and IV diltiazem as well as IV metoprolol.  He had an echocardiogram which revealed asymmetric left ventricular hypertrophy, normal LV systolic function, and no significant valvular heart disease.  He was discharged on oral diltiazem.      He comes in today for followup and states that he has been feeling well.  He is not having shortness of breath but he has had a cough for the last three weeks.  He denies fevers or chills.  He complains of some fatigue.  He has not been back to exercising since his visit to the Cardiac Evaluation Clinic.  He does have some shortness of breath.        Review of Systems   Constitution: Negative for fever, malaise/fatigue, weight gain and weight loss.   HENT: Negative for ear pain, hearing loss, nosebleeds and sore throat.    Eyes: Negative for double vision, pain, vision loss in left eye and vision loss in right eye.   Cardiovascular:        See history of present illness.   Respiratory: Positive for cough. Negative for shortness of breath, sleep disturbances due to breathing, snoring and wheezing.    Endocrine: Negative for cold intolerance, heat intolerance and polyuria.   Skin: Negative for itching, poor wound healing and rash.   Musculoskeletal: Negative for joint pain, joint swelling and myalgias.   Gastrointestinal: Negative for abdominal pain, diarrhea, hematochezia, nausea and vomiting.   Genitourinary: Negative for hematuria  "and hesitancy.   Neurological: Negative for numbness, paresthesias and seizures.   Psychiatric/Behavioral: Negative for depression. The patient is not nervous/anxious.            ECG 12 Lead  Date/Time: 11/10/2017 3:27 PM  Performed by: IRASEMA SAL  Authorized by: IRASEMA SAL   Comparison: compared with previous ECG from 10/13/2017  Comparison to previous ECG: Since prior tracing no longer having paroxysms of A. fib  Rhythm: sinus rhythm  BPM: 82  Conduction: conduction normal  ST Segments: ST segments normal  T Waves: T waves normal  Clinical impression: normal ECG               Objective:     /90 (BP Location: Right arm, Patient Position: Sitting, Cuff Size: Adult)  Pulse 82  Resp 16  Ht 72\" (182.9 cm)  Wt 297 lb 12.8 oz (135 kg)  BMI 40.39 kg/m2 Body mass index is 40.39 kg/(m^2).     Physical Exam   Constitutional: He appears well-developed.   HENT:   Head: Normocephalic and atraumatic.   Eyes: Conjunctivae and lids are normal. Pupils are equal, round, and reactive to light. Lids are everted and swept, no foreign bodies found.   Neck: Normal range of motion. No JVD present. Carotid bruit is not present. No tracheal deviation present. No thyroid mass present.   Cardiovascular: Normal rate, regular rhythm and normal heart sounds.    Pulses:       Dorsalis pedis pulses are 2+ on the right side, and 2+ on the left side.   Pulmonary/Chest: Effort normal. He has rhonchi in the left middle field.   Abdominal: Normal appearance and bowel sounds are normal.   Musculoskeletal: Normal range of motion.   Neurological: He is alert. He has normal strength.   Skin: Skin is warm, dry and intact.   Psychiatric: He has a normal mood and affect. His behavior is normal.   Vitals reviewed.          Assessment/Plan:       1. Atrial Fibrillation and Atrial Flutter  Assessment  • The patient has paroxysmal atrial fibrillation  • This is non-valvular in etiology  • The patient's CHADS2-VASc score is 1  • A " [id number] score of 1 is considered an intermediate risk for a thromboembolic event    Plan  • Continue in atrial fibrillation with rate control  • Continue rivaroxaban for antithrombotic therapy, bleeding issues discussed  • Continue diltiazem for rhythm control  • His EKG looks much better.  He is feeling better.  He has not felt any irregular heartbeats.  I am going to increase the diltiazem to 360 mg.  Continue Xarelto.  2.  Hypertension.  I am and increase the diltiazem.  I'm going to take him off the Lasix and told him just to use it as needed.  3.  Cough.  I'm in essential him down for a chest x-ray.  He is got some rhonchi in the left mid lung field.  4.  Shortness of breath with exertion and fatigue.  He has risk factors for heart disease which include obesity, hypertension and a family history of heart disease.  I'm going to do a treadmill EKG on him before clearing him to get back to exercise.    He will follow up with [facility] in 6 months.  I will see him back either 6 months or year after that unless he is having problems sooner.    Addendum: Chest x-ray showed no active process.    Orders Placed This Encounter   Procedures   • XR Chest 2 View     Standing Status:   Future     Number of Occurrences:   1     Standing Expiration Date:   11/10/2018     Order Specific Question:   Reason for Exam:     Answer:   cough   • Treadmill Stress Test     Do not stop diltiazem     Standing Status:   Future     Standing Expiration Date:   11/10/2018     Order Specific Question:   Reason for exam?     Answer:   Angina   • ECG 12 Lead     This order was created via procedure documentation      Hoang So   Home Medication Instructions DERIAN:    Printed on:11/10/17 9968   Medication Information                      CRESTOR 10 MG tablet  TAKE ONE TABLET BY MOUTH EVERY NIGHT AT BEDTIME             diltiaZEM CD (CARDIZEM CD) 360 MG 24 hr capsule  Take 1 capsule by mouth Daily.             diphenhydrAMINE (BENADRYL) 25 MG  tablet  Take  by mouth.             montelukast (SINGULAIR) 10 MG tablet  Take 1 tablet by mouth Every Night.             rivaroxaban (XARELTO) 20 MG tablet  Take 1 tablet by mouth Daily.                        Niki Aguillon MD  11/10/17  3:34 PM

## 2017-11-14 ENCOUNTER — OFFICE VISIT (OUTPATIENT)
Dept: SLEEP MEDICINE | Facility: HOSPITAL | Age: 62
End: 2017-11-14
Attending: PSYCHIATRY & NEUROLOGY

## 2017-11-14 VITALS
BODY MASS INDEX: 39.96 KG/M2 | DIASTOLIC BLOOD PRESSURE: 80 MMHG | SYSTOLIC BLOOD PRESSURE: 135 MMHG | WEIGHT: 295 LBS | HEIGHT: 72 IN | HEART RATE: 89 BPM

## 2017-11-14 DIAGNOSIS — G47.33 OSA (OBSTRUCTIVE SLEEP APNEA): Primary | ICD-10-CM

## 2017-11-14 DIAGNOSIS — G47.10 HYPERSOMNIA: ICD-10-CM

## 2017-11-14 DIAGNOSIS — R06.02 SHORTNESS OF BREATH: ICD-10-CM

## 2017-11-14 DIAGNOSIS — I48.91 ATRIAL FIBRILLATION, UNSPECIFIED TYPE (HCC): ICD-10-CM

## 2017-11-14 PROCEDURE — G0463 HOSPITAL OUTPT CLINIC VISIT: HCPCS

## 2017-12-04 ENCOUNTER — HOSPITAL ENCOUNTER (OUTPATIENT)
Dept: CARDIOLOGY | Facility: HOSPITAL | Age: 62
Discharge: HOME | End: 2017-12-04
Attending: INTERNAL MEDICINE | Admitting: INTERNAL MEDICINE

## 2017-12-04 DIAGNOSIS — R06.09 DOE (DYSPNEA ON EXERTION): ICD-10-CM

## 2017-12-04 LAB
BH CV STRESS BP STAGE 1: NORMAL
BH CV STRESS BP STAGE 2: NORMAL
BH CV STRESS DURATION MIN STAGE 1: 3
BH CV STRESS DURATION MIN STAGE 2: 3
BH CV STRESS DURATION SEC STAGE 1: 0
BH CV STRESS DURATION SEC STAGE 2: 0
BH CV STRESS GRADE STAGE 1: 10
BH CV STRESS GRADE STAGE 2: 12
BH CV STRESS HR STAGE 1: 115
BH CV STRESS HR STAGE 2: 138
BH CV STRESS METS STAGE 1: 5
BH CV STRESS METS STAGE 2: 7.5
BH CV STRESS PROTOCOL 1: NORMAL
BH CV STRESS RECOVERY BP: NORMAL MMHG
BH CV STRESS RECOVERY HR: 99 BPM
BH CV STRESS SPEED STAGE 1: 1.7
BH CV STRESS SPEED STAGE 2: 2.5
BH CV STRESS STAGE 1: 1
BH CV STRESS STAGE 2: 2
MAXIMAL PREDICTED HEART RATE: 156 BPM
PERCENT MAX PREDICTED HR: 88.46 %
STRESS BASELINE BP: NORMAL MMHG
STRESS BASELINE HR: 94 BPM
STRESS PERCENT HR: 104 %
STRESS POST ESTIMATED WORKLOAD: 7.5 METS
STRESS POST EXERCISE DUR MIN: 6 MIN
STRESS POST EXERCISE DUR SEC: 0 SEC
STRESS POST PEAK BP: NORMAL MMHG
STRESS POST PEAK HR: 138 BPM
STRESS TARGET HR: 133 BPM

## 2017-12-04 PROCEDURE — 93018 CV STRESS TEST I&R ONLY: CPT | Performed by: INTERNAL MEDICINE

## 2017-12-04 PROCEDURE — 93016 CV STRESS TEST SUPVJ ONLY: CPT | Performed by: INTERNAL MEDICINE

## 2017-12-04 PROCEDURE — 93017 CV STRESS TEST TRACING ONLY: CPT

## 2017-12-05 ENCOUNTER — TELEPHONE (OUTPATIENT)
Dept: CARDIOLOGY | Facility: CLINIC | Age: 62
End: 2017-12-05

## 2017-12-06 NOTE — TELEPHONE ENCOUNTER
I called and spoke with him.  His stress test looked good.  Okay for him to exercise.  He has been watching his blood pressure at home and it has been normal.

## 2017-12-26 ENCOUNTER — AMBULATORY SURGICAL CENTER (OUTPATIENT)
Dept: URBAN - METROPOLITAN AREA SURGERY 17 | Facility: SURGERY | Age: 62
End: 2017-12-26
Payer: COMMERCIAL

## 2017-12-26 ENCOUNTER — OFFICE (OUTPATIENT)
Dept: URBAN - METROPOLITAN AREA CLINIC 64 | Facility: CLINIC | Age: 62
End: 2017-12-26
Payer: COMMERCIAL

## 2017-12-26 VITALS
DIASTOLIC BLOOD PRESSURE: 92 MMHG | HEART RATE: 93 BPM | HEART RATE: 85 BPM | HEART RATE: 87 BPM | SYSTOLIC BLOOD PRESSURE: 181 MMHG | HEART RATE: 78 BPM | OXYGEN SATURATION: 92 % | HEART RATE: 83 BPM | RESPIRATION RATE: 25 BRPM | HEIGHT: 72 IN | SYSTOLIC BLOOD PRESSURE: 138 MMHG | HEART RATE: 89 BPM | HEART RATE: 84 BPM | DIASTOLIC BLOOD PRESSURE: 99 MMHG | OXYGEN SATURATION: 94 % | DIASTOLIC BLOOD PRESSURE: 80 MMHG | DIASTOLIC BLOOD PRESSURE: 93 MMHG | TEMPERATURE: 98.6 F | SYSTOLIC BLOOD PRESSURE: 146 MMHG | DIASTOLIC BLOOD PRESSURE: 77 MMHG | RESPIRATION RATE: 20 BRPM | DIASTOLIC BLOOD PRESSURE: 85 MMHG | SYSTOLIC BLOOD PRESSURE: 139 MMHG | WEIGHT: 285 LBS | RESPIRATION RATE: 14 BRPM | OXYGEN SATURATION: 95 % | HEART RATE: 90 BPM | RESPIRATION RATE: 23 BRPM | SYSTOLIC BLOOD PRESSURE: 156 MMHG | SYSTOLIC BLOOD PRESSURE: 151 MMHG | SYSTOLIC BLOOD PRESSURE: 144 MMHG | TEMPERATURE: 98.4 F | SYSTOLIC BLOOD PRESSURE: 173 MMHG | OXYGEN SATURATION: 97 % | RESPIRATION RATE: 18 BRPM | OXYGEN SATURATION: 93 % | SYSTOLIC BLOOD PRESSURE: 136 MMHG | HEART RATE: 94 BPM

## 2017-12-26 DIAGNOSIS — K57.30 DIVERTICULOSIS OF LARGE INTESTINE WITHOUT PERFORATION OR ABS: ICD-10-CM

## 2017-12-26 DIAGNOSIS — D12.4 BENIGN NEOPLASM OF DESCENDING COLON: ICD-10-CM

## 2017-12-26 DIAGNOSIS — D12.0 BENIGN NEOPLASM OF CECUM: ICD-10-CM

## 2017-12-26 DIAGNOSIS — K64.9 UNSPECIFIED HEMORRHOIDS: ICD-10-CM

## 2017-12-26 DIAGNOSIS — Z86.010 PERSONAL HISTORY OF COLONIC POLYPS: ICD-10-CM

## 2017-12-26 DIAGNOSIS — Z12.11 ENCOUNTER FOR SCREENING FOR MALIGNANT NEOPLASM OF COLON: ICD-10-CM

## 2017-12-26 LAB
GI HISTOLOGY: A. UNSPECIFIED: (no result)
GI HISTOLOGY: PDF REPORT: (no result)

## 2017-12-26 PROCEDURE — 88305 TISSUE EXAM BY PATHOLOGIST: CPT | Mod: 33 | Performed by: INTERNAL MEDICINE

## 2017-12-26 PROCEDURE — 45385 COLONOSCOPY W/LESION REMOVAL: CPT | Mod: 33 | Performed by: INTERNAL MEDICINE

## 2017-12-26 RX ADMIN — PROPOFOL 50 MG: 10 INJECTION, EMULSION INTRAVENOUS at 14:51

## 2017-12-26 RX ADMIN — PROPOFOL 50 MG: 10 INJECTION, EMULSION INTRAVENOUS at 14:58

## 2017-12-26 RX ADMIN — PROPOFOL 50 MG: 10 INJECTION, EMULSION INTRAVENOUS at 15:02

## 2017-12-26 RX ADMIN — PROPOFOL 50 MG: 10 INJECTION, EMULSION INTRAVENOUS at 14:53

## 2017-12-26 RX ADMIN — PROPOFOL 100 MG: 10 INJECTION, EMULSION INTRAVENOUS at 14:49

## 2017-12-26 RX ADMIN — PROPOFOL 50 MG: 10 INJECTION, EMULSION INTRAVENOUS at 14:55

## 2017-12-26 RX ADMIN — LIDOCAINE HYDROCHLORIDE 50 MG: 10 INJECTION, SOLUTION EPIDURAL; INFILTRATION; INTRACAUDAL; PERINEURAL at 14:48

## 2017-12-26 NOTE — SERVICEHPINOTES
61 yo WM with a hx. of polyps. He presents for a follow up colonoscopy. No GI complaints. He was recently dx. with  A Fib.

## 2018-01-29 RX ORDER — ROSUVASTATIN CALCIUM 10 MG/1
TABLET, COATED ORAL
Qty: 30 TABLET | Refills: 3 | Status: SHIPPED | OUTPATIENT
Start: 2018-01-29 | End: 2018-03-08 | Stop reason: SDUPTHER

## 2018-03-08 ENCOUNTER — TELEPHONE (OUTPATIENT)
Dept: CARDIOLOGY | Facility: HOSPITAL | Age: 63
End: 2018-03-08

## 2018-03-08 RX ORDER — ROSUVASTATIN CALCIUM 10 MG/1
10 TABLET, COATED ORAL NIGHTLY
Qty: 90 TABLET | Refills: 3 | Status: SHIPPED | OUTPATIENT
Start: 2018-03-08 | End: 2018-08-27

## 2018-04-18 ENCOUNTER — HOSPITAL ENCOUNTER (OUTPATIENT)
Dept: CARDIOLOGY | Facility: HOSPITAL | Age: 63
Setting detail: RECURRING SERIES
Discharge: HOME | End: 2018-04-18

## 2018-04-18 ENCOUNTER — HOSPITAL ENCOUNTER (INPATIENT)
Facility: HOSPITAL | Age: 63
LOS: 2 days | Discharge: HOME | End: 2018-04-20
Attending: INTERNAL MEDICINE | Admitting: INTERNAL MEDICINE

## 2018-04-18 ENCOUNTER — TELEPHONE (OUTPATIENT)
Dept: CARDIOLOGY | Facility: CLINIC | Age: 63
End: 2018-04-18

## 2018-04-18 VITALS
BODY MASS INDEX: 39.28 KG/M2 | OXYGEN SATURATION: 94 % | SYSTOLIC BLOOD PRESSURE: 123 MMHG | HEIGHT: 72 IN | DIASTOLIC BLOOD PRESSURE: 77 MMHG | HEART RATE: 129 BPM | WEIGHT: 290 LBS

## 2018-04-18 DIAGNOSIS — I48.91 ATRIAL FIBRILLATION, UNSPECIFIED TYPE (HCC): ICD-10-CM

## 2018-04-18 DIAGNOSIS — I48.0 PAF (PAROXYSMAL ATRIAL FIBRILLATION) (HCC): Primary | ICD-10-CM

## 2018-04-18 DIAGNOSIS — R94.31 ABNORMAL EKG: Primary | ICD-10-CM

## 2018-04-18 LAB
ALBUMIN SERPL-MCNC: 4 G/DL (ref 3.5–5.2)
ALBUMIN/GLOB SERPL: 1.3 G/DL
ALP SERPL-CCNC: 92 U/L (ref 39–117)
ALT SERPL W P-5'-P-CCNC: 28 U/L (ref 1–41)
ANION GAP SERPL CALCULATED.3IONS-SCNC: 11.8 MMOL/L
AST SERPL-CCNC: 21 U/L (ref 1–40)
BILIRUB SERPL-MCNC: 0.4 MG/DL (ref 0.1–1.2)
BUN BLD-MCNC: 16 MG/DL (ref 8–23)
BUN/CREAT SERPL: 14.4 (ref 7–25)
CALCIUM SPEC-SCNC: 9 MG/DL (ref 8.6–10.5)
CHLORIDE SERPL-SCNC: 104 MMOL/L (ref 98–107)
CO2 SERPL-SCNC: 24.2 MMOL/L (ref 22–29)
CREAT BLD-MCNC: 1.11 MG/DL (ref 0.76–1.27)
GFR SERPL CREATININE-BSD FRML MDRD: 67 ML/MIN/1.73
GLOBULIN UR ELPH-MCNC: 3.1 GM/DL
GLUCOSE BLD-MCNC: 99 MG/DL (ref 65–99)
NT-PROBNP SERPL-MCNC: 128.3 PG/ML (ref 0–900)
POTASSIUM BLD-SCNC: 4.2 MMOL/L (ref 3.5–5.2)
PROT SERPL-MCNC: 7.1 G/DL (ref 6–8.5)
SODIUM BLD-SCNC: 140 MMOL/L (ref 136–145)
TROPONIN T SERPL-MCNC: <0.01 NG/ML (ref 0–0.03)

## 2018-04-18 PROCEDURE — 36415 COLL VENOUS BLD VENIPUNCTURE: CPT

## 2018-04-18 PROCEDURE — G0378 HOSPITAL OBSERVATION PER HR: HCPCS

## 2018-04-18 PROCEDURE — 99214 OFFICE O/P EST MOD 30 MIN: CPT | Performed by: INTERNAL MEDICINE

## 2018-04-18 PROCEDURE — 83880 ASSAY OF NATRIURETIC PEPTIDE: CPT | Performed by: INTERNAL MEDICINE

## 2018-04-18 PROCEDURE — 84484 ASSAY OF TROPONIN QUANT: CPT | Performed by: INTERNAL MEDICINE

## 2018-04-18 PROCEDURE — 94760 N-INVAS EAR/PLS OXIMETRY 1: CPT

## 2018-04-18 PROCEDURE — 80053 COMPREHEN METABOLIC PANEL: CPT

## 2018-04-18 RX ORDER — PROPAFENONE HYDROCHLORIDE 225 MG/1
225 CAPSULE, EXTENDED RELEASE ORAL EVERY 12 HOURS SCHEDULED
Qty: 60 CAPSULE | Refills: 3 | Status: SHIPPED | OUTPATIENT
Start: 2018-04-18 | End: 2018-04-20 | Stop reason: HOSPADM

## 2018-04-18 RX ORDER — ROSUVASTATIN CALCIUM 5 MG/1
5 TABLET, COATED ORAL DAILY
Status: DISCONTINUED | OUTPATIENT
Start: 2018-04-19 | End: 2018-04-20 | Stop reason: HOSPADM

## 2018-04-18 RX ORDER — DILTIAZEM HYDROCHLORIDE 180 MG/1
360 CAPSULE, COATED, EXTENDED RELEASE ORAL
Status: DISCONTINUED | OUTPATIENT
Start: 2018-04-19 | End: 2018-04-19

## 2018-04-18 RX ORDER — PROPAFENONE HYDROCHLORIDE 225 MG/1
225 CAPSULE, EXTENDED RELEASE ORAL EVERY 12 HOURS SCHEDULED
Status: DISCONTINUED | OUTPATIENT
Start: 2018-04-18 | End: 2018-04-19

## 2018-04-18 RX ORDER — NITROGLYCERIN 0.4 MG/1
0.4 TABLET SUBLINGUAL
Status: DISCONTINUED | OUTPATIENT
Start: 2018-04-18 | End: 2018-04-20 | Stop reason: HOSPADM

## 2018-04-18 RX ORDER — PROPAFENONE HYDROCHLORIDE 150 MG/1
150 TABLET, COATED ORAL EVERY 8 HOURS SCHEDULED
Status: COMPLETED | OUTPATIENT
Start: 2018-04-18 | End: 2018-04-18

## 2018-04-18 RX ORDER — SODIUM CHLORIDE 0.9 % (FLUSH) 0.9 %
10 SYRINGE (ML) INJECTION AS NEEDED
Status: DISCONTINUED | OUTPATIENT
Start: 2018-04-18 | End: 2018-04-20 | Stop reason: HOSPADM

## 2018-04-18 RX ORDER — DIPHENHYDRAMINE HCL 25 MG
25 CAPSULE ORAL EVERY 6 HOURS PRN
Status: DISCONTINUED | OUTPATIENT
Start: 2018-04-18 | End: 2018-04-20 | Stop reason: HOSPADM

## 2018-04-18 RX ADMIN — PROPAFENONE HYDROCHLORIDE 150 MG: 150 TABLET, COATED ORAL at 12:31

## 2018-04-18 RX ADMIN — PROPAFENONE HYDROCHLORIDE 225 MG: 225 CAPSULE, EXTENDED RELEASE ORAL at 20:15

## 2018-04-18 NOTE — ADDENDUM NOTE
Encounter addended by: Lilliana Cruz RN on: 4/18/2018  2:47 PM<BR>    Actions taken: Charge Capture section accepted

## 2018-04-18 NOTE — TELEPHONE ENCOUNTER
[phone]    Pt called c/o IRR HR, anxiousness, light headed and nauseated with some mild aching in L UE. Also experiencing some cold sweats.  Pt denies CP or tingling in arm/neck. Pt took his BP and it is 124/99 HR 68.    [place] Aguillon-[place] [facility]  He will be here in a half hour.  [facility]

## 2018-04-18 NOTE — PROGRESS NOTES
Date of Hospital Visit:18  Encounter Provider: Surjit Ramos MD  Place of Service: [facility] CARDIOLOGY  Patient Name: Hoang So  :1955  Referral Provider: Niki Aguillon MD    Chief complaint paroxysmal atrial fibrillation  The patient is a 62-year-old gentleman who presented in 2017 which is not feeling well being out of breath was found to be in atrial fibrillation he was given IV Lasix IV diltiazem as well as IV metoprolol he didn't up converting back to sinus rhythm was started on Xarelto.  Had an echocardiogram that showed asymmetric left ventricular hypertrophy but normal left ventricular systolic function no significant valvular disease he also subsequently had a perfusion stress test that showed no evidence of ischemia.  But he now comes back into our cardiac evaluation Center with onset after he exercised was sitting in his chair he became short of breath just didn't feel well was given this time he had a little bit left arm discomfort and nausea no real chest pain persisted he presented here and was found to have runs of atrial fibrillation.  Prior to this episode he takes it been doing well no near-syncope or syncope.  No blood in his stool or black tarry stools and no stroke type symptoms.      History of Present Illness    Past Medical History:   Diagnosis Date   • Allergic    • Atrial fibrillation    • Dizziness    • Hyperlipidemia    • Hypertension    • Low back pain    • Sleep apnea    • SOB (shortness of breath)        Past Surgical History:   Procedure Laterality Date   • CERVICAL DISC SURGERY     • SHOULDER SURGERY         Current Outpatient Prescriptions on File Prior to Encounter   Medication Sig Dispense Refill   • diltiaZEM CD (CARDIZEM CD) 360 MG 24 hr capsule Take 1 capsule by mouth Daily. 90 capsule 3   • diphenhydrAMINE (BENADRYL) 25 MG tablet Take  by mouth.     • montelukast (SINGULAIR) 10 MG tablet Take 1 tablet by mouth  "Every Night. 30 tablet 6   • rivaroxaban (XARELTO) 20 MG tablet Take 1 tablet by mouth Daily. 90 tablet 3   • rosuvastatin (CRESTOR) 10 MG tablet Take 1 tablet by mouth Every Night. (Patient taking differently: Take 5 mg by mouth Every Night.) 90 tablet 3     No current facility-administered medications on file prior to encounter.        Social History     Social History   • Marital status:      Spouse name: N/A   • Number of children: N/A   • Years of education: N/A     Occupational History   • Not on file.     Social History Main Topics   • Smoking status: Former Smoker     Packs/day: 0.25     Years: 10.00     Types: Cigarettes     Quit date: 2007   • Smokeless tobacco: Former User      Comment: daily caffiene   • Alcohol use Yes      Comment: social   • Drug use: No   • Sexual activity: Defer     Other Topics Concern   • Not on file     Social History Narrative   • No narrative on file       Family History   Problem Relation Age of Onset   • Heart disease Father         REVIEW OF SYSTEMS:   All ROS was performed and is Negative except as outlined in HPI     Objective:     Vitals:    04/18/18 1147 04/18/18 1149   BP: 122/77 123/74   BP Location: Left arm    Patient Position: Sitting    Pulse: 100    SpO2: 94%    Weight: 132 kg (290 lb)    Height: 182.9 cm (72\")      Body mass index is 39.33 kg/m².  Flowsheet Rows    Flowsheet Row First Filed Value   Admission Height 182.9 cm (72\") Documented at 04/18/2018 1147   Admission Weight 132 kg (290 lb) Documented at 04/18/2018 1147          Physical Exam   Physical Exam   Constitutional: He is oriented to person, place, and time. He appears well-developed and well-nourished. No distress.   HENT:   Head: Normocephalic.   Eyes: Conjunctivae are normal. Pupils are equal, round, and reactive to light. No scleral icterus.   Neck: Normal carotid pulses, no hepatojugular reflux and no JVD present. Carotid bruit is not present. No tracheal deviation, no edema and no " erythema present. No thyromegaly present.   Cardiovascular: Normal rate, S1 normal, S2 normal, normal heart sounds and intact distal pulses.  An irregular rhythm present.  No extrasystoles are present. PMI is not displaced.  Exam reveals no gallop, no distant heart sounds and no friction rub.    No murmur heard.  Pulses:       Carotid pulses are 2+ on the right side, and 2+ on the left side.       Radial pulses are 2+ on the right side, and 2+ on the left side.        Femoral pulses are 2+ on the right side, and 2+ on the left side.       Dorsalis pedis pulses are 2+ on the right side, and 2+ on the left side.        Posterior tibial pulses are 2+ on the right side, and 2+ on the left side.   Pulmonary/Chest: Effort normal and breath sounds normal. No respiratory distress. He has no decreased breath sounds. He has no wheezes. He has no rhonchi. He has no rales. He exhibits no tenderness.   Abdominal: Soft. Bowel sounds are normal. He exhibits no distension and no mass. There is no hepatosplenomegaly. There is no tenderness. There is no rebound and no guarding.   Musculoskeletal: He exhibits no edema, tenderness or deformity.   Neurological: He is alert and oriented to person, place, and time.   Skin: Skin is warm and dry. No rash noted. He is not diaphoretic. No cyanosis or erythema. No pallor. Nails show no clubbing.   Psychiatric: He has a normal mood and affect. His speech is normal and behavior is normal. Judgment and thought content normal.       Lab Review:                                            @LABRCNT(bnp)@    I personally viewed and interpreted the patient's EKG/Telemetry data     Assessment:   1.  Paroxysmal atrial fibrillation.  He is obviously very symptomatic when he's in this.  He's artery anticoagulated with Xarelto.  He's failed high-dose diltiazem.  We'll start propafenone, check an ECG on Friday and follow-up with Dr. hunt. He has an appointment in 3 weeks he will keep that C has problems.  2.   Hypertension follow blood pressure.  3.  Obstructive sleep apnea now on CPAP.                  Plan:

## 2018-04-19 PROBLEM — I48.0 PAF (PAROXYSMAL ATRIAL FIBRILLATION) (HCC): Status: ACTIVE | Noted: 2018-04-19

## 2018-04-19 LAB
ANION GAP SERPL CALCULATED.3IONS-SCNC: 13.3 MMOL/L
BUN BLD-MCNC: 19 MG/DL (ref 8–23)
BUN/CREAT SERPL: 16.1 (ref 7–25)
CALCIUM SPEC-SCNC: 9.2 MG/DL (ref 8.6–10.5)
CHLORIDE SERPL-SCNC: 100 MMOL/L (ref 98–107)
CO2 SERPL-SCNC: 26.7 MMOL/L (ref 22–29)
CREAT BLD-MCNC: 1.18 MG/DL (ref 0.76–1.27)
GFR SERPL CREATININE-BSD FRML MDRD: 62 ML/MIN/1.73
GLUCOSE BLD-MCNC: 97 MG/DL (ref 65–99)
POTASSIUM BLD-SCNC: 4.3 MMOL/L (ref 3.5–5.2)
SODIUM BLD-SCNC: 140 MMOL/L (ref 136–145)

## 2018-04-19 PROCEDURE — 93005 ELECTROCARDIOGRAM TRACING: CPT | Performed by: INTERNAL MEDICINE

## 2018-04-19 PROCEDURE — 63710000001 DIPHENHYDRAMINE PER 50 MG: Performed by: INTERNAL MEDICINE

## 2018-04-19 PROCEDURE — 99233 SBSQ HOSP IP/OBS HIGH 50: CPT | Performed by: INTERNAL MEDICINE

## 2018-04-19 PROCEDURE — 80048 BASIC METABOLIC PNL TOTAL CA: CPT | Performed by: INTERNAL MEDICINE

## 2018-04-19 PROCEDURE — 93010 ELECTROCARDIOGRAM REPORT: CPT | Performed by: INTERNAL MEDICINE

## 2018-04-19 PROCEDURE — 99253 IP/OBS CNSLTJ NEW/EST LOW 45: CPT | Performed by: NURSE PRACTITIONER

## 2018-04-19 RX ORDER — PROPAFENONE HYDROCHLORIDE 225 MG/1
225 TABLET, FILM COATED ORAL EVERY 8 HOURS SCHEDULED
Status: DISCONTINUED | OUTPATIENT
Start: 2018-04-19 | End: 2018-04-20 | Stop reason: HOSPADM

## 2018-04-19 RX ORDER — ZOLPIDEM TARTRATE 5 MG/1
2.5 TABLET ORAL NIGHTLY PRN
Status: DISCONTINUED | OUTPATIENT
Start: 2018-04-19 | End: 2018-04-20 | Stop reason: HOSPADM

## 2018-04-19 RX ORDER — ACETAMINOPHEN 325 MG/1
650 TABLET ORAL EVERY 6 HOURS PRN
Status: DISCONTINUED | OUTPATIENT
Start: 2018-04-19 | End: 2018-04-20 | Stop reason: HOSPADM

## 2018-04-19 RX ORDER — METOPROLOL TARTRATE 50 MG/1
50 TABLET, FILM COATED ORAL EVERY 12 HOURS SCHEDULED
Status: DISCONTINUED | OUTPATIENT
Start: 2018-04-19 | End: 2018-04-19

## 2018-04-19 RX ADMIN — METOPROLOL TARTRATE 25 MG: 25 TABLET ORAL at 20:39

## 2018-04-19 RX ADMIN — DILTIAZEM HYDROCHLORIDE 360 MG: 180 CAPSULE, COATED, EXTENDED RELEASE ORAL at 08:31

## 2018-04-19 RX ADMIN — RIVAROXABAN 20 MG: 20 TABLET, FILM COATED ORAL at 08:31

## 2018-04-19 RX ADMIN — PROPAFENONE HYDROCHLORIDE 225 MG: 225 TABLET, COATED ORAL at 21:50

## 2018-04-19 RX ADMIN — ROSUVASTATIN CALCIUM 5 MG: 5 TABLET, FILM COATED ORAL at 08:32

## 2018-04-19 RX ADMIN — ZOLPIDEM TARTRATE 2.5 MG: 5 TABLET ORAL at 21:50

## 2018-04-19 RX ADMIN — DIPHENHYDRAMINE HYDROCHLORIDE 25 MG: 25 CAPSULE ORAL at 00:03

## 2018-04-19 RX ADMIN — PROPAFENONE HYDROCHLORIDE 225 MG: 225 CAPSULE, EXTENDED RELEASE ORAL at 08:31

## 2018-04-19 NOTE — PROGRESS NOTES
"CC: Paroxysmal atrial fibrillation.    Interval History:   No complaints today feels good    Vital Signs  Temp:  [98 °F (36.7 °C)-98.1 °F (36.7 °C)] 98.1 °F (36.7 °C)  Heart Rate:  [] 113  Resp:  [15-16] 16  BP: ()/(74-85) 99/83  No intake or output data in the 24 hours ending 04/19/18 0718  Flowsheet Rows    Flowsheet Row First Filed Value   Admission Height 182.9 cm (72\") Documented at 04/18/2018 1600   Admission Weight 132 kg (290 lb) Documented at 04/18/2018 1600          PHYSICAL EXAM:  General: No acute distress  Resp:NL Rate, unlabored, clear  CV:NL rate and Irregular rhythm, NL PMI, Nl S1 and S2, no Mumur, no gallop, no rub, No JVD. Normal pedal pulses  ABD:Nl sounds, no masses or tenderness, nondistended, no quarding or rebound  Neuro: alert,cooperative and oriented  Extr: No edema or cyanosis, moves all extremities      Results Review:      Results from last 7 days  Lab Units 04/19/18  0326   SODIUM mmol/L 140   POTASSIUM mmol/L 4.3   CHLORIDE mmol/L 100   CO2 mmol/L 26.7   BUN mg/dL 19   CREATININE mg/dL 1.18   GLUCOSE mg/dL 97   CALCIUM mg/dL 9.2       Results from last 7 days  Lab Units 04/18/18  1146   TROPONIN T ng/mL <0.010                         @LABRCNT(bnp)@  I reviewed the patient's new clinical results.  I personally viewed and interpreted the patient's EKG/Telemetry data        Medication Review:   Meds reviewed         Assessment/Plan  1.  Paroxysmal atrial fibrillation.  On Rythmol however is not appear to affect his rhythm any QT maybe a little more prolonged will ask our arrhythmia service to see.  2.  Hypertension follow blood pressure.  3.  Obstructive sleep apnea now on CPAP.        Surjit Ramos MD  04/19/18  7:18 AM        "

## 2018-04-19 NOTE — CONSULTS
Electrophysiology Hospital Consult            Patient Name: Hoang So  Age/Sex: 62 y.o. male  : 1955  MRN: [id number]    Date of Admission: 2018  Date of Encounter Visit: 18  Encounter Provider: ASAEL Poole  Referring Provider: Surjit Ramos MD  Place of Service: [facility] CARDIOLOGY  Patient Care Team:  ASAEL Mai as PCP - General (Family Medicine)  Dima Vallejo MD as Consulting Physician (Gastroenterology)      Subjective:   EP Consultation for: AFib    Chief Complaint: SOA, nausea, lightheadedness    History of Present Illness:  Hoang So is a 62 y.o. male patient of Dr. Ramos's and Dr. Aguillon diagnosed with new onset AF in 2017. He was treated with CCB, BB and at some point did convert back to SR. He has been on rivaroxaban for AC.  He had an echo and stress test which were both basically okay with normal LV systolic function. He did well for the last several months. He presented to the [facility] yesterday with complaints of SOA and not feeling well after he exercised. He had some mild left arm pain and nausea but no chest pain. In the [facility] he was noted to be having runs of PAF and felt to be symptomatic so he was admitted to [facility] for further evaluation and treatment.    He has a history of HTN, SULLY/CPAP, HLD and obesity.     He currently feels better. He was started on propafenone and is currently going in and out of afib. He does not have chest pain, shortness of breath, dizziness or palpitations. He has been exercising 4-5 days per week and has been trying to loose weight but has only lost 5 lbs over the last few months. He does drink a fair amount of alcohol typically a couple of days per week when he plays cards or golfs with his buddies. In talking with him and his wife he actually drank this past , Monday and Tuesday and then had problems and was in AF on Wednesday.     Past Medical History:  Past Medical History:    Diagnosis Date   • Allergic    • Atrial fibrillation    • Dizziness    • Hyperlipidemia    • Hypertension    • Low back pain    • Sleep apnea    • SOB (shortness of breath)        Past Surgical History:   Procedure Laterality Date   • CERVICAL DISC SURGERY     • SHOULDER SURGERY         Home Medications:   Facility-Administered Medications Prior to Admission   Medication Dose Route Frequency Provider Last Rate Last Dose   • nitroglycerin (NITROSTAT) SL tablet 0.4 mg  0.4 mg Sublingual Q5 Min PRN Surjit Ramos MD       • sodium chloride 0.9 % flush 10 mL  10 mL Intravenous PRN Surjit Ramos MD         Prescriptions Prior to Admission   Medication Sig Dispense Refill Last Dose   • diltiaZEM CD (CARDIZEM CD) 360 MG 24 hr capsule Take 1 capsule by mouth Daily. 90 capsule 3 4/18/2018 at Unknown time   • diphenhydrAMINE (BENADRYL) 25 MG tablet Take  by mouth.   4/17/2018 at Unknown time   • propafenone SR (RYTHMOL SR) 225 MG 12 hr capsule Take 1 capsule by mouth Every 12 (Twelve) Hours. 60 capsule 3 4/18/2018 at Unknown time   • rivaroxaban (XARELTO) 20 MG tablet Take 1 tablet by mouth Daily. 90 tablet 3 4/18/2018 at Unknown time   • rosuvastatin (CRESTOR) 10 MG tablet Take 1 tablet by mouth Every Night. (Patient taking differently: Take 5 mg by mouth Every Night.) 90 tablet 3 4/18/2018 at Unknown time   • montelukast (SINGULAIR) 10 MG tablet Take 1 tablet by mouth Every Night. 30 tablet 6 Taking       Allergies:  No Known Allergies    Past Social History:  Social History     Social History   • Marital status:      Spouse name: N/A   • Number of children: N/A   • Years of education: N/A     Occupational History   • Not on file.     Social History Main Topics   • Smoking status: Former Smoker     Packs/day: 0.25     Years: 10.00     Types: Cigarettes     Quit date: 2007   • Smokeless tobacco: Former User      Comment: daily caffiene   • Alcohol use Yes      Comment: social   • Drug use: No   • Sexual  activity: Defer     Other Topics Concern   • Not on file     Social History Narrative   • No narrative on file       Past Family History:  Family History   Problem Relation Age of Onset   • Heart disease Father        Review of Systems: All systems reviewed. Pertinent positives identified in HPI. All other systems are negative.     14 point ROS was performed and is negative except as outlined in HPI.     Objective:     Objective:  Vital Signs (last 24 hours)       04/18 0700  -  04/19 0659 04/19 0700  -  04/19 1506   Most Recent    Temp (°F) 98 -  98.1    97.7 -  98.5     97.7 (36.5)    Heart Rate 66 -  (!)129    94 -  (!)148     94    Resp 15 -  16      18     18    BP 99/83 -  155/85    124/74 -  130/76     130/76    SpO2 (%) 94 -  96    94 -  98     98        Temp:  [97.7 °F (36.5 °C)-98.5 °F (36.9 °C)] 97.7 °F (36.5 °C)  Heart Rate:  [] 94  Resp:  [15-18] 18  BP: ()/(74-85) 130/76  Body mass index is 39.33 kg/m².        Physical Exam:     General Appearance: No acute distress, well developed and well nourished.   Eyes: Conjunctiva and lids: No erythema, swelling, or discharge. Sclera non-icteric.   HENT: Atraumatic, normocephalic. External eyes, ears, and nose normal.   Respiratory: No signs of respiratory distress. Respiration rhythm and depth normal.   Clear to auscultation. No rales, crackles, rhonchi, or wheezing auscultated.   Cardiovascular:  Jugular Venous Pressure: Normal  Heart Rate and Rhythm: Irregularly, irregular.  Heart Sounds: Normal S1 and S2. No S3 or S4 noted.  Murmurs: No murmurs noted. No rubs, thrills, or gallops.   Arterial Pulses: Posterior tibialis and dorsalis pedis pulses normal.   Lower Extremities: No edema noted.  Gastrointestinal:  Abdomen soft, non-distended, non-tender.  Musculoskeletal: Normal movement of extremities  Skin: Warm and dry.   Psychiatric: Patient alert and oriented to person, place, and time. Speech and behavior appropriate. Normal mood and  affect.    Labs:   Lab Review:       Results from last 7 days  Lab Units 18  0326 18  1146   SODIUM mmol/L 140 140   POTASSIUM mmol/L 4.3 4.2   CHLORIDE mmol/L 100 104   CO2 mmol/L 26.7 24.2   BUN mg/dL 19 16   CREATININE mg/dL 1.18 1.11   GLUCOSE mg/dL 97 99   CALCIUM mg/dL 9.2 9.0   AST (SGOT) U/L  --  21   ALT (SGPT) U/L  --  28       Results from last 7 days  Lab Units 18  1146   TROPONIN T ng/mL <0.010                           Results from last 7 days  Lab Units 18  1146   PROBNP pg/mL 128.3                   Imagin/4/17 Treadmill Stress Test:    Interpretation Summary     · The patient reported shortness of breath during the stress test.  · Normal ECG with no significant stress induced changes noted.  · Arrhythmias during stress: rare PACs, rare PVCs.  · Hypertension noted            10/13/17 Echo:    Interpretation Summary     · Left ventricular wall thickness is consistent with moderate asymmetric hypertrophy.  · Left ventricular systolic function is normal. Estimated EF = 62%.  · Mild mitral valve regurgitation is present           EKG:               I personally viewed and interpreted the patient's EKG/Telemetry data.    Assessment:     Active Problems:    PAF (paroxysmal atrial fibrillation)        Plan:     Dr. Lee and I saw Mr. So. He has what looks like focal afib and it will likely respond to propafenone. Will dc dilitiazem and switch to beta blocker since diltiazem can increase propafenone levels and increase risk of QT prolongation. He continues to have PAF currently but in reviewing telemetry he is having more periods of SR. Adjusting meds to propafenone 225 mg q8hrs and metoprolol 25 bid. Discussed AF and the importance of risk factor modification. Likely home tomorrow -we will see tomorrow afternoon.     Thank you for allowing me to participate in the care of Hoang So. Feel free to contact me directly with any further questions or concerns.    Terra PINON  ASAEL Youssef  [facility]  04/19/18  3:06 PM

## 2018-04-19 NOTE — PLAN OF CARE
Problem: Patient Care Overview  Goal: Plan of Care Review  Outcome: Ongoing (interventions implemented as appropriate)   04/19/18 0616   Coping/Psychosocial   Plan of Care Reviewed With patient;spouse   Plan of Care Review   Progress improving   OTHER   Outcome Summary Patient switching between SR w/PVCs and A-fib. Tachycardiac, HR up to mid 130s. Vital signs stable. No chest pain reported.        Problem: Arrhythmia/Dysrhythmia (Symptomatic) (Adult)  Goal: Signs and Symptoms of Listed Potential Problems Will be Absent, Minimized or Managed (Arrhythmia/Dysrhythmia)  Outcome: Ongoing (interventions implemented as appropriate)

## 2018-04-20 VITALS
BODY MASS INDEX: 39.28 KG/M2 | DIASTOLIC BLOOD PRESSURE: 76 MMHG | OXYGEN SATURATION: 94 % | HEART RATE: 67 BPM | TEMPERATURE: 98.4 F | HEIGHT: 72 IN | WEIGHT: 290 LBS | SYSTOLIC BLOOD PRESSURE: 119 MMHG | RESPIRATION RATE: 16 BRPM

## 2018-04-20 LAB — GLUCOSE BLDC GLUCOMTR-MCNC: 101 MG/DL (ref 70–130)

## 2018-04-20 PROCEDURE — 99238 HOSP IP/OBS DSCHRG MGMT 30/<: CPT | Performed by: NURSE PRACTITIONER

## 2018-04-20 PROCEDURE — 93005 ELECTROCARDIOGRAM TRACING: CPT | Performed by: NURSE PRACTITIONER

## 2018-04-20 PROCEDURE — 93010 ELECTROCARDIOGRAM REPORT: CPT | Performed by: INTERNAL MEDICINE

## 2018-04-20 PROCEDURE — 82962 GLUCOSE BLOOD TEST: CPT

## 2018-04-20 RX ORDER — PROPAFENONE HYDROCHLORIDE 225 MG/1
225 TABLET, FILM COATED ORAL EVERY 8 HOURS SCHEDULED
Qty: 90 TABLET | Refills: 3 | Status: SHIPPED | OUTPATIENT
Start: 2018-04-20 | End: 2018-08-09 | Stop reason: SDUPTHER

## 2018-04-20 RX ADMIN — RIVAROXABAN 20 MG: 20 TABLET, FILM COATED ORAL at 08:42

## 2018-04-20 RX ADMIN — ROSUVASTATIN CALCIUM 5 MG: 5 TABLET, FILM COATED ORAL at 11:21

## 2018-04-20 RX ADMIN — METOPROLOL TARTRATE 25 MG: 25 TABLET ORAL at 08:42

## 2018-04-20 RX ADMIN — PROPAFENONE HYDROCHLORIDE 225 MG: 225 TABLET, COATED ORAL at 14:23

## 2018-04-20 RX ADMIN — PROPAFENONE HYDROCHLORIDE 225 MG: 225 TABLET, COATED ORAL at 05:01

## 2018-04-20 NOTE — PLAN OF CARE
Problem: Patient Care Overview  Goal: Plan of Care Review  Outcome: Ongoing (interventions implemented as appropriate)   04/20/18 1140   Coping/Psychosocial   Plan of Care Reviewed With patient;spouse   Plan of Care Review   Progress improving   OTHER   Outcome Summary Patient been in SR since 2150. Vitals stable. Gave ambien 2.5mg at 2000 for sleep. Possible discharge today.

## 2018-04-20 NOTE — DISCHARGE SUMMARY
DISCHARGE NOTE    Patient Name: Hoang So  Age/Sex: 62 y.o. male  : 1955  MRN: [id number]    Date of Discharge:  2018   Date of Admit: 2018  Encounter Provider: ASAEL Poole  Place of Service: [facility] CARDIOLOGY  Patient Care Team:  ASAEL Mai as PCP - General (Family Medicine)  Dima Vallejo MD as Consulting Physician (Gastroenterology)    Subjective:     Discharge Diagnosis:  Active Problems:    PAF (paroxysmal atrial fibrillation)      Hospital Course:   Mr. So is a 63 y/o who was admitted on 18 with runs of PAF and dyspnea. He has a history of A fib (diagnosed 10/17), HTN, SULLY (cpap) and hyperlipidemia. He takes xarelto and cardizem for his a fib. On arrival HR was 100, bp 122/77. He was started on propafenone on admission. The following day his QRS became more prolonged (477 from 408) and we were asked to see. At that time he was in and out of A fib with rates up to 148. The cardizem was discontinued, metoprolol was started and propafenone dose was continued. He is now in NSR and ready for discharge.      Temp:  [97.5 °F (36.4 °C)-98.4 °F (36.9 °C)] 98.4 °F (36.9 °C)  Heart Rate:  [] 67  Resp:  [16-18] 16  BP: (119-140)/(72-92) 119/76    Intake/Output Summary (Last 24 hours) at 18 1456  Last data filed at 18 1700   Gross per 24 hour   Intake              240 ml   Output                0 ml   Net              240 ml       Physical Exam:  Physical Exam   Constitutional: He is oriented to person, place, and time. He appears well-developed and well-nourished. No distress.   HENT:   Head: Normocephalic and atraumatic.   Eyes: Conjunctivae are normal.   Neck: Neck supple.   Cardiovascular: Normal rate, regular rhythm and normal heart sounds.    Pulmonary/Chest: Effort normal and breath sounds normal. No respiratory distress.    Abdominal: Soft.   Musculoskeletal: Normal range of motion. He exhibits no edema.   Neurological: He is alert and oriented to person, place, and time.   Skin: Skin is warm and dry.   Psychiatric: He has a normal mood and affect. His behavior is normal.        Labs:     Results from last 7 days  Lab Units 04/19/18  0326 04/18/18  1146   SODIUM mmol/L 140 140   POTASSIUM mmol/L 4.3 4.2   CHLORIDE mmol/L 100 104   CO2 mmol/L 26.7 24.2   BUN mg/dL 19 16   CREATININE mg/dL 1.18 1.11   GLUCOSE mg/dL 97 99   CALCIUM mg/dL 9.2 9.0   AST (SGOT) U/L  --  21   ALT (SGPT) U/L  --  28       Results from last 7 days  Lab Units 04/18/18  1146   TROPONIN T ng/mL <0.010                       Results from last 7 days  Lab Units 04/18/18  1146   PROBNP pg/mL 128.3           Discharge Diet:    Dietary Orders     Start     Ordered    04/18/18 1623  Diet Regular  Diet Effective Now     Question:  Diet Texture / Consistency  Answer:  Regular    04/18/18 1624            Activity at Discharge:  as tolerated    Discharge Medications   Hoang So   Home Medication Instructions DERIAN:[id number]    Printed on:04/20/18 1453   Medication Information                      diphenhydrAMINE (BENADRYL) 25 MG tablet  Take  by mouth.             metoprolol tartrate (LOPRESSOR) 25 MG tablet  Take 1 tablet by mouth Every 12 (Twelve) Hours.             propafenone (RYTHMOL) 225 MG tablet  Take 1 tablet by mouth Every 8 (Eight) Hours.             rivaroxaban (XARELTO) 20 MG tablet  Take 1 tablet by mouth Daily.             rosuvastatin (CRESTOR) 10 MG tablet  Take 1 tablet by mouth Every Night.                   Discharge disposition: home    Follow-up Appointments  Follow-up Information     ASAEL Mai Follow up.    Specialty:  Family Medicine  Contact information:  [address] [facility] [address] [phone]             ASAEL Poole Follow up in 4 week(s).    Specialty:  Cardiology  Contact information:  [address]  [facility]  [address]  [facility] [address]  [phone]                 Future Appointments  Date Time Provider Department Center   5/14/2018 11:00 AM ASAEL Ritchie [facility] None         ASAEL Poole  04/20/18  2:56 PM

## 2018-04-24 ENCOUNTER — CLINICAL SUPPORT (OUTPATIENT)
Dept: CARDIOLOGY | Facility: CLINIC | Age: 63
End: 2018-04-24

## 2018-04-24 DIAGNOSIS — I48.0 PAF (PAROXYSMAL ATRIAL FIBRILLATION) (HCC): Primary | ICD-10-CM

## 2018-04-24 PROCEDURE — 93000 ELECTROCARDIOGRAM COMPLETE: CPT | Performed by: NURSE PRACTITIONER

## 2018-04-24 NOTE — PROGRESS NOTES
Procedure     ECG 12 Lead  Date/Time: 4/24/2018 2:11 PM  Performed by: JOELLE EUBANKS  Authorized by: JOELLE EUBANKS   Comparison: compared with previous ECG   Similar to previous ECG  Rhythm: sinus rhythm  Comments: QRS/QT fine                Pt came in today for an EKG check. Her medications were reconciled and her vitals are as follows: /80 and HR 65. Her EKG was addressed with Joelle Eubanks. No changes were made.

## 2018-05-08 ENCOUNTER — TELEPHONE (OUTPATIENT)
Dept: CARDIOLOGY | Facility: HOSPITAL | Age: 63
End: 2018-05-08

## 2018-05-24 ENCOUNTER — OFFICE VISIT (OUTPATIENT)
Dept: CARDIOLOGY | Facility: CLINIC | Age: 63
End: 2018-05-24

## 2018-05-24 VITALS
HEART RATE: 64 BPM | BODY MASS INDEX: 38.6 KG/M2 | WEIGHT: 285 LBS | HEIGHT: 72 IN | DIASTOLIC BLOOD PRESSURE: 94 MMHG | SYSTOLIC BLOOD PRESSURE: 136 MMHG

## 2018-05-24 DIAGNOSIS — I48.91 ATRIAL FIBRILLATION, UNSPECIFIED TYPE (HCC): Primary | ICD-10-CM

## 2018-05-24 DIAGNOSIS — G47.33 OSA (OBSTRUCTIVE SLEEP APNEA): ICD-10-CM

## 2018-05-24 DIAGNOSIS — E66.9 OBESITY (BMI 30-39.9): ICD-10-CM

## 2018-05-24 DIAGNOSIS — I10 BENIGN ESSENTIAL HYPERTENSION: ICD-10-CM

## 2018-05-24 PROCEDURE — 93000 ELECTROCARDIOGRAM COMPLETE: CPT | Performed by: NURSE PRACTITIONER

## 2018-05-24 PROCEDURE — 99214 OFFICE O/P EST MOD 30 MIN: CPT | Performed by: NURSE PRACTITIONER

## 2018-05-24 NOTE — PROGRESS NOTES
Date of Office Visit: 2018  Encounter Provider: ASAEL Poole  Place of Service: [facility] CARDIOLOGY  Patient Name: Hoang So  :1955    Chief Complaint   Patient presents with   • Atrial Fibrillation   :     HPI: Hoang So is a 62 y.o. male who is a Dr. Ramos's and Dr. Aguillon's diagnosed with new onset AF in 2017. He has a history of HTN, SULLY/CPAP, HLD and obesity. He was treated with CCB, BB and at some point did convert back to SR. He has been on rivaroxaban for AC.  He had an echo and stress test which were both basically okay with normal LV systolic function. He did well for the last several months. He presented to the [facility] on 18 with complaints of SOA and not feeling well after he exercised. He had some mild left arm pain and nausea but no chest pain. In the [facility] he was noted to be having runs of PAF and felt to be symptomatic so he was admitted to [facility] for further evaluation and treatment. Dr. Lee and I saw him in consultation during his hospitalization.     He was started on propafenone and responded well. We discussed AF risk factor modification and he was discharged home. He presents for follow up today.    He is feeling good. He was unaware of his AF when he was having it as far as the fact that he does not feel irregular heart beat or palpitations. He just had the SOA and discomfort when his HR was elevated. He is exercising most days and has lost about 5 lbs. He has cut back on his ETOH consumption also. He has been monitoring his b/p and HR at home and they have been good. HR typically in the 60's. He has had no further chest pain, PND, orthopnea, edema or dizziness. His primary complaint is fatigue. He exercises almost every morning but just drags after that.         Past Medical History:   Diagnosis Date   • Allergic    • Atrial fibrillation    • Dizziness    • Hyperlipidemia    • Hypertension    • Low back pain    • Sleep apnea     • SOB (shortness of breath)        Past Surgical History:   Procedure Laterality Date   • CERVICAL DISC SURGERY     • SHOULDER SURGERY         Social History     Social History   • Marital status:      Spouse name: N/A   • Number of children: N/A   • Years of education: N/A     Occupational History   • Not on file.     Social History Main Topics   • Smoking status: Former Smoker     Packs/day: 0.25     Years: 10.00     Types: Cigarettes     Quit date: 2007   • Smokeless tobacco: Former User      Comment: daily caffiene   • Alcohol use Yes      Comment: social   • Drug use: No   • Sexual activity: Defer     Other Topics Concern   • Not on file     Social History Narrative   • No narrative on file       Family History   Problem Relation Age of Onset   • Heart disease Father        Review of Systems   Constitution: Positive for malaise/fatigue. Negative for chills and fever.   Cardiovascular: Negative for chest pain, dyspnea on exertion, leg swelling, near-syncope, orthopnea, palpitations, paroxysmal nocturnal dyspnea and syncope.   Respiratory: Negative for cough and shortness of breath.    Musculoskeletal: Negative for joint pain, joint swelling and myalgias.   Gastrointestinal: Negative for abdominal pain, diarrhea, melena, nausea and vomiting.   Genitourinary: Negative for frequency and hematuria.   Neurological: Negative for light-headedness, numbness, paresthesias and seizures.   Allergic/Immunologic: Negative.    All other systems reviewed and are negative.      No Known Allergies      Current Outpatient Prescriptions:   •  metoprolol tartrate (LOPRESSOR) 25 MG tablet, Take 0.5 tablets by mouth Every 12 (Twelve) Hours., Disp: 180 tablet, Rfl: 3  •  propafenone (RYTHMOL) 225 MG tablet, Take 1 tablet by mouth Every 8 (Eight) Hours., Disp: 90 tablet, Rfl: 3  •  rivaroxaban (XARELTO) 20 MG tablet, Take 1 tablet by mouth Daily., Disp: 90 tablet, Rfl: 3  •  rosuvastatin (CRESTOR) 10 MG tablet, Take 1 tablet  "by mouth Every Night. (Patient taking differently: Take 5 mg by mouth Every Night.), Disp: 90 tablet, Rfl: 3      Objective:     Vitals:    05/24/18 1017   BP: 136/94   Pulse: 64   Weight: 129 kg (285 lb)   Height: 182.9 cm (72.01\")     Body mass index is 38.64 kg/m².    PHYSICAL EXAM:    Vitals Reviewed.   General Appearance: No acute distress, well developed and well nourished.   Eyes: Conjunctiva and lids: No erythema, swelling, or discharge. Sclera non-icteric.   HENT: Atraumatic, normocephalic. External eyes, ears, and nose normal.   Respiratory: No signs of respiratory distress. Respiration rhythm and depth normal.   Clear to auscultation. No rales, crackles, rhonchi, or wheezing auscultated.   Cardiovascular:  Jugular Venous Pressure: Normal  Heart Rate and Rhythm: Normal, Heart Sounds: Normal S1 and S2. No S3 or S4 noted.  Murmurs: No murmurs noted. No rubs, thrills, or gallops.   Arterial Pulses:  Posterior tibialis and dorsalis pedis pulses normal.   Lower Extremities: No edema noted.  Gastrointestinal:  Abdomen soft, non-distended, non-tender.   Musculoskeletal: Normal movement of extremities  Skin and Nails: General appearance normal. No pallor, cyanosis, diaphoresis. Skin temperature normal. No clubbing of fingernails.   Psychiatric: Patient alert and oriented to person, place, and time. Speech and behavior appropriate. Normal mood and affect.       ECG 12 Lead  Date/Time: 5/24/2018 12:10 PM  Performed by: JOELLE EUBANKS  Authorized by: JOELLE EUBANKS   Comparison: compared with previous ECG from 4/24/2018  Similar to previous ECG  Rhythm: sinus rhythm  BPM: 64                Assessment:       Diagnosis Plan   1. Atrial fibrillation, unspecified type     2. Benign essential hypertension     3. SULLY (obstructive sleep apnea)     4. Obesity (BMI 30-39.9)            Plan:       1. Atrial Fibrillation and Atrial Flutter  Assessment  • The patient has paroxysmal atrial fibrillation  • The patient's " CHADS2-VASc score is 1  • A [id number]-ITGx score of 1 is considered an intermediate risk for a thromboembolic event  • Rivaroxaban prescribed  • SR. Doing well on propafenone.     Fatigue ? Due to metoprolol, will have him decrease it to 12.5mg BID. Did discuss that if he has an episode of PAF that he is aware of (HR elevation persistently) he can take extra metoprolol. He will continue to work on risk factor modification.     He will follow up with Dr. Aguillon in 3 months and Dr. Lee in 9 months.     Plan  • Attempt to maintain sinus rhythm  • Continue rivaroxaban for antithrombotic therapy, bleeding issues discussed  • Continue propafenone for rhythm control    2. HTN, readings at home have been normal.    3. SULLY, treated with CPAP.    4. For the problem of overweight/obesity, we discussed the importance of lifestyle measures and strategies for weight loss, such as improved nutrition, regular exercise and sleep hygiene.          As always, it has been a pleasure to participate in your patient's care.      Sincerely,         ASAEL Steinberg

## 2018-08-10 RX ORDER — PROPAFENONE HYDROCHLORIDE 225 MG/1
TABLET, FILM COATED ORAL
Qty: 270 TABLET | Refills: 1 | Status: SHIPPED | OUTPATIENT
Start: 2018-08-10 | End: 2018-12-14

## 2018-08-27 ENCOUNTER — OFFICE VISIT (OUTPATIENT)
Dept: CARDIOLOGY | Facility: CLINIC | Age: 63
End: 2018-08-27

## 2018-08-27 VITALS
HEART RATE: 68 BPM | SYSTOLIC BLOOD PRESSURE: 124 MMHG | WEIGHT: 290.8 LBS | BODY MASS INDEX: 39.39 KG/M2 | RESPIRATION RATE: 16 BRPM | HEIGHT: 72 IN | DIASTOLIC BLOOD PRESSURE: 86 MMHG

## 2018-08-27 DIAGNOSIS — G47.33 OSA (OBSTRUCTIVE SLEEP APNEA): ICD-10-CM

## 2018-08-27 DIAGNOSIS — I10 HYPERTENSION, UNSPECIFIED TYPE: ICD-10-CM

## 2018-08-27 DIAGNOSIS — E78.2 MIXED HYPERLIPIDEMIA: ICD-10-CM

## 2018-08-27 DIAGNOSIS — I48.91 ATRIAL FIBRILLATION, UNSPECIFIED TYPE (HCC): Primary | ICD-10-CM

## 2018-08-27 PROCEDURE — 99214 OFFICE O/P EST MOD 30 MIN: CPT | Performed by: INTERNAL MEDICINE

## 2018-08-27 PROCEDURE — 93000 ELECTROCARDIOGRAM COMPLETE: CPT | Performed by: INTERNAL MEDICINE

## 2018-08-27 RX ORDER — ROSUVASTATIN CALCIUM 10 MG/1
5 TABLET, COATED ORAL NIGHTLY
Start: 2018-08-27 | End: 2019-08-28 | Stop reason: SDUPTHER

## 2018-08-27 NOTE — PROGRESS NOTES
PATIENTINFORMATION    Date of Office Visit: 2018  Encounter Provider: Niki Aguillon MD  Place of Service: [facility] CARDIOLOGY  Patient Name: Hoang So  : 1955    Subjective:     Encounter Date:2018      Patient ID: Hoang So is a 63 y.o. male.      History of Present Illness      ROS        ECG 12 Lead  Date/Time: 2018 10:43 AM  Performed by: NIKI AGUILLON  Authorized by: NIKI AGUILLON                  Objective:     There were no vitals taken for this visit. There is no height or weight on file to calculate BMI.     Physical Exam    Lab Review:       Assessment/Plan:         No orders of the defined types were placed in this encounter.       Discharge Medications          Accurate as of 18 10:43 AM. If you have any questions, ask your nurse or doctor.               Changes to Medications      Instructions Start Date   rosuvastatin 10 MG tablet  Commonly known as:  CRESTOR  What changed:  how much to take   10 mg, Oral, Nightly         Continue These Medications      Instructions Start Date   metoprolol tartrate 25 MG tablet  Commonly known as:  LOPRESSOR   12.5 mg, Oral, Every 12 Hours Scheduled      propafenone 225 MG tablet  Commonly known as:  RYTHMOL   TAKE ONE TABLET BY MOUTH EVERY 8 HOURS      rivaroxaban 20 MG tablet  Commonly known as:  XARELTO   20 mg, Oral, Daily                    Modesta Quiñonez MA  18  10:43 AM

## 2018-08-27 NOTE — PROGRESS NOTES
PATIENTINFORMATION    Date of Office Visit: 2018  Encounter Provider: Niki Aguillon MD  Place of Service: [facility] CARDIOLOGY  Patient Name: Hoang So  : 1955    Subjective:     Encounter Date:2018      Patient ID: Hoang So is a 63 y.o. male.      History of Present Illness    This is a nice gentleman who presented to the cardiac evaluation clinic in 10/2017.  He complained of dizziness and shortness of breath.  He was found to be having paroxysms of atrial fibrillation.  He was given IV Lasix and IV diltiazem as well as IV metoprolol.  He had an echocardiogram which revealed asymmetric left ventricular hypertrophy, normal LV systolic function, and no significant valvular heart disease.  He was discharged on oral diltiazem.   he had recurrent, symptomatic atrial fibrillation and was started on propafenone and has remained in sinus rhythm to his knowledge since.    He comes in today denying palpitations, shortness of breath, chest pain, edema or lightheadedness.  He goes to the gym and works out 5 days a week.  He is discouraged with his inability to lose weight but says he has not made any dietary changes.       Review of Systems   Constitution: Negative for fever, malaise/fatigue, weight gain and weight loss.   HENT: Negative for ear pain, hearing loss, nosebleeds and sore throat.    Eyes: Negative for double vision, pain, vision loss in left eye and vision loss in right eye.   Cardiovascular:        See history of present illness.   Respiratory: Negative for cough, shortness of breath, sleep disturbances due to breathing, snoring and wheezing.    Endocrine: Negative for cold intolerance, heat intolerance and polyuria.   Skin: Negative for itching, poor wound healing and rash.   Musculoskeletal: Positive for joint pain. Negative for joint swelling and myalgias.   Gastrointestinal: Negative for abdominal pain, diarrhea, hematochezia, nausea and vomiting.  "  Genitourinary: Negative for hematuria and hesitancy.   Neurological: Negative for numbness, paresthesias and seizures.   Psychiatric/Behavioral: Negative for depression. The patient is not nervous/anxious.            ECG 12 Lead  Date/Time: 8/27/2018 11:01 AM  Performed by: IRASEMA SAL  Authorized by: IRASEMA SAL   Comparison: compared with previous ECG from 5/24/2018  Similar to previous ECG  Rhythm: sinus rhythm  BPM: 68  Conduction: conduction normal  ST Segments: ST segments normal  T Waves: T waves normal  Clinical impression: normal ECG               Objective:     /86 (BP Location: Right arm, Patient Position: Sitting, Cuff Size: Adult)   Pulse 68   Resp 16   Ht 182.9 cm (72\")   Wt 132 kg (290 lb 12.8 oz)   BMI 39.44 kg/m²  Body mass index is 39.44 kg/m².     Physical Exam   Constitutional: He appears well-developed.   HENT:   Head: Normocephalic and atraumatic.   Eyes: Pupils are equal, round, and reactive to light. Conjunctivae and lids are normal. Lids are everted and swept, no foreign bodies found.   Neck: Normal range of motion. No JVD present. Carotid bruit is not present. No tracheal deviation present. No thyroid mass present.   Cardiovascular: Normal rate, regular rhythm and normal heart sounds.    Pulses:       Dorsalis pedis pulses are 2+ on the right side, and 2+ on the left side.   Pulmonary/Chest: Effort normal and breath sounds normal.   Abdominal: Normal appearance and bowel sounds are normal.   Musculoskeletal: Normal range of motion.   Neurological: He is alert. He has normal strength.   Skin: Skin is warm, dry and intact.   Psychiatric: He has a normal mood and affect. His behavior is normal.   Vitals reviewed.          Assessment/Plan:       1. Atrial Fibrillation and Atrial Flutter  Assessment  • The patient has paroxysmal atrial fibrillation  • This is non-valvular in etiology  • The patient's CHADS2-VASc score is 1  • A [id number] score of 1 is considered an " intermediate risk for a thromboembolic event    Plan  • Attempt to maintain sinus rhythm  • Continue rivaroxaban for antithrombotic therapy, bleeding issues discussed  • Continue propafenone for rhythm control  • Continue beta blocker for rate control    2.  Hypertension.  Blood pressure is well controlled.  I have encouraged him to continue with exercise.  3.  Foot pain.  This is consistent with plantar fasciitis.  We discussed using tennis balls or frozen water bottles to help stretch out that fascia.  4.  Hyperlipidemia.  Continue Crestor    He has an appointment with Dr. Lee in February.  I will see him back in one year.    Orders Placed This Encounter   Procedures   • ECG 12 Lead     This order was created via procedure documentation   • ECG 12 Lead     This order was created via procedure documentation        Discharge Medications          Accurate as of 8/27/18 11:17 AM. If you have any questions, ask your nurse or doctor.               Continue These Medications      Instructions Start Date   metoprolol tartrate 25 MG tablet  Commonly known as:  LOPRESSOR   12.5 mg, Oral, Every 12 Hours Scheduled      propafenone 225 MG tablet  Commonly known as:  RYTHMOL   TAKE ONE TABLET BY MOUTH EVERY 8 HOURS      rivaroxaban 20 MG tablet  Commonly known as:  XARELTO   20 mg, Oral, Daily      rosuvastatin 10 MG tablet  Commonly known as:  CRESTOR   5 mg, Oral, Nightly                    Niki Aguillon MD  08/27/18  11:17 AM

## 2018-10-31 RX ORDER — RIVAROXABAN 20 MG/1
TABLET, FILM COATED ORAL
Qty: 90 TABLET | Refills: 2 | Status: SHIPPED | OUTPATIENT
Start: 2018-10-31 | End: 2018-12-14

## 2018-11-07 ENCOUNTER — HOSPITAL ENCOUNTER (EMERGENCY)
Facility: HOSPITAL | Age: 63
Discharge: HOME | End: 2018-11-07
Attending: EMERGENCY MEDICINE | Admitting: EMERGENCY MEDICINE

## 2018-11-07 ENCOUNTER — APPOINTMENT (OUTPATIENT)
Dept: CT IMAGING | Facility: HOSPITAL | Age: 63
End: 2018-11-07

## 2018-11-07 VITALS
TEMPERATURE: 98.3 F | WEIGHT: 291 LBS | RESPIRATION RATE: 18 BRPM | HEART RATE: 71 BPM | SYSTOLIC BLOOD PRESSURE: 145 MMHG | HEIGHT: 72 IN | DIASTOLIC BLOOD PRESSURE: 82 MMHG | BODY MASS INDEX: 39.42 KG/M2 | OXYGEN SATURATION: 93 %

## 2018-11-07 DIAGNOSIS — K61.1 PERIRECTAL ABSCESS: Primary | ICD-10-CM

## 2018-11-07 LAB
ALBUMIN SERPL-MCNC: 4.1 G/DL (ref 3.5–5.2)
ALBUMIN/GLOB SERPL: 1.1 G/DL
ALP SERPL-CCNC: 97 U/L (ref 39–117)
ALT SERPL W P-5'-P-CCNC: 19 U/L (ref 1–41)
ANION GAP SERPL CALCULATED.3IONS-SCNC: 10.9 MMOL/L
AST SERPL-CCNC: 15 U/L (ref 1–40)
BASOPHILS # BLD AUTO: 0.1 10*3/MM3 (ref 0–0.2)
BASOPHILS NFR BLD AUTO: 0.8 % (ref 0–1.5)
BILIRUB SERPL-MCNC: 0.7 MG/DL (ref 0.1–1.2)
BUN BLD-MCNC: 17 MG/DL (ref 8–23)
BUN/CREAT SERPL: 14.7 (ref 7–25)
CALCIUM SPEC-SCNC: 9.9 MG/DL (ref 8.6–10.5)
CHLORIDE SERPL-SCNC: 101 MMOL/L (ref 98–107)
CO2 SERPL-SCNC: 26.1 MMOL/L (ref 22–29)
CREAT BLD-MCNC: 1.16 MG/DL (ref 0.76–1.27)
DEPRECATED RDW RBC AUTO: 43.8 FL (ref 37–54)
EOSINOPHIL # BLD AUTO: 0.09 10*3/MM3 (ref 0–0.7)
EOSINOPHIL NFR BLD AUTO: 0.7 % (ref 0.3–6.2)
ERYTHROCYTE [DISTWIDTH] IN BLOOD BY AUTOMATED COUNT: 13 % (ref 11.5–14.5)
GFR SERPL CREATININE-BSD FRML MDRD: 63 ML/MIN/1.73
GLOBULIN UR ELPH-MCNC: 3.9 GM/DL
GLUCOSE BLD-MCNC: 114 MG/DL (ref 65–99)
HCT VFR BLD AUTO: 48.4 % (ref 40.4–52.2)
HGB BLD-MCNC: 16.1 G/DL (ref 13.7–17.6)
IMM GRANULOCYTES # BLD: 0.04 10*3/MM3 (ref 0–0.03)
IMM GRANULOCYTES NFR BLD: 0.3 % (ref 0–0.5)
LYMPHOCYTES # BLD AUTO: 2.37 10*3/MM3 (ref 0.9–4.8)
LYMPHOCYTES NFR BLD AUTO: 18 % (ref 19.6–45.3)
MCH RBC QN AUTO: 30.8 PG (ref 27–32.7)
MCHC RBC AUTO-ENTMCNC: 33.3 G/DL (ref 32.6–36.4)
MCV RBC AUTO: 92.5 FL (ref 79.8–96.2)
MONOCYTES # BLD AUTO: 1.07 10*3/MM3 (ref 0.2–1.2)
MONOCYTES NFR BLD AUTO: 8.1 % (ref 5–12)
NEUTROPHILS # BLD AUTO: 9.54 10*3/MM3 (ref 1.9–8.1)
NEUTROPHILS NFR BLD AUTO: 72.4 % (ref 42.7–76)
NRBC BLD MANUAL-RTO: 0 /100 WBC (ref 0–0)
PLATELET # BLD AUTO: 237 10*3/MM3 (ref 140–500)
PMV BLD AUTO: 10.8 FL (ref 6–12)
POTASSIUM BLD-SCNC: 4.6 MMOL/L (ref 3.5–5.2)
PROT SERPL-MCNC: 8 G/DL (ref 6–8.5)
RBC # BLD AUTO: 5.23 10*6/MM3 (ref 4.6–6)
SODIUM BLD-SCNC: 138 MMOL/L (ref 136–145)
WBC NRBC COR # BLD: 13.17 10*3/MM3 (ref 4.5–10.7)

## 2018-11-07 PROCEDURE — 96374 THER/PROPH/DIAG INJ IV PUSH: CPT

## 2018-11-07 PROCEDURE — 46040 I&D ISCHIORCT&/PERIRCT ABSC: CPT | Performed by: SURGERY

## 2018-11-07 PROCEDURE — 99203 OFFICE O/P NEW LOW 30 MIN: CPT | Performed by: SURGERY

## 2018-11-07 PROCEDURE — 25010000002 ONDANSETRON PER 1 MG: Performed by: EMERGENCY MEDICINE

## 2018-11-07 PROCEDURE — 25010000002 HYDROMORPHONE 1 MG/ML SOLUTION: Performed by: EMERGENCY MEDICINE

## 2018-11-07 PROCEDURE — 72193 CT PELVIS W/DYE: CPT

## 2018-11-07 PROCEDURE — 25010000002 HYDROMORPHONE 1 MG/ML SOLUTION: Performed by: SURGERY

## 2018-11-07 PROCEDURE — 25010000002 IOPAMIDOL 61 % SOLUTION: Performed by: EMERGENCY MEDICINE

## 2018-11-07 PROCEDURE — 96376 TX/PRO/DX INJ SAME DRUG ADON: CPT

## 2018-11-07 PROCEDURE — 96375 TX/PRO/DX INJ NEW DRUG ADDON: CPT

## 2018-11-07 PROCEDURE — 99284 EMERGENCY DEPT VISIT MOD MDM: CPT

## 2018-11-07 PROCEDURE — 80053 COMPREHEN METABOLIC PANEL: CPT | Performed by: EMERGENCY MEDICINE

## 2018-11-07 PROCEDURE — 85025 COMPLETE CBC W/AUTO DIFF WBC: CPT | Performed by: EMERGENCY MEDICINE

## 2018-11-07 RX ORDER — AMOXICILLIN AND CLAVULANATE POTASSIUM 875; 125 MG/1; MG/1
1 TABLET, FILM COATED ORAL EVERY 12 HOURS SCHEDULED
Qty: 14 TABLET | Refills: 0 | Status: SHIPPED | OUTPATIENT
Start: 2018-11-07 | End: 2018-11-16

## 2018-11-07 RX ORDER — LIDOCAINE HYDROCHLORIDE AND EPINEPHRINE 10; 10 MG/ML; UG/ML
10 INJECTION, SOLUTION INFILTRATION; PERINEURAL ONCE
Status: COMPLETED | OUTPATIENT
Start: 2018-11-07 | End: 2018-11-07

## 2018-11-07 RX ORDER — HYDROCODONE BITARTRATE AND ACETAMINOPHEN 5; 325 MG/1; MG/1
1 TABLET ORAL EVERY 6 HOURS PRN
Qty: 8 TABLET | Refills: 0 | Status: SHIPPED | OUTPATIENT
Start: 2018-11-07 | End: 2018-11-30

## 2018-11-07 RX ORDER — ONDANSETRON 2 MG/ML
4 INJECTION INTRAMUSCULAR; INTRAVENOUS ONCE
Status: COMPLETED | OUTPATIENT
Start: 2018-11-07 | End: 2018-11-07

## 2018-11-07 RX ADMIN — HYDROMORPHONE HYDROCHLORIDE 1 MG: 1 INJECTION, SOLUTION INTRAMUSCULAR; INTRAVENOUS; SUBCUTANEOUS at 16:29

## 2018-11-07 RX ADMIN — HYDROMORPHONE HYDROCHLORIDE 1 MG: 10 INJECTION, SOLUTION INTRAMUSCULAR; INTRAVENOUS; SUBCUTANEOUS at 11:23

## 2018-11-07 RX ADMIN — HYDROMORPHONE HYDROCHLORIDE 1 MG: 1 INJECTION, SOLUTION INTRAMUSCULAR; INTRAVENOUS; SUBCUTANEOUS at 13:48

## 2018-11-07 RX ADMIN — LIDOCAINE HYDROCHLORIDE AND EPINEPHRINE 10 ML: 10; 10 INJECTION, SOLUTION INFILTRATION; PERINEURAL at 16:29

## 2018-11-07 RX ADMIN — IOPAMIDOL 85 ML: 612 INJECTION, SOLUTION INTRAVENOUS at 11:52

## 2018-11-07 RX ADMIN — ONDANSETRON 4 MG: 2 INJECTION INTRAMUSCULAR; INTRAVENOUS at 11:23

## 2018-11-09 ENCOUNTER — ANESTHESIA EVENT (OUTPATIENT)
Dept: PERIOP | Facility: HOSPITAL | Age: 63
End: 2018-11-09

## 2018-11-09 ENCOUNTER — OFFICE VISIT (OUTPATIENT)
Dept: SURGERY | Facility: CLINIC | Age: 63
End: 2018-11-09

## 2018-11-09 ENCOUNTER — ANESTHESIA (OUTPATIENT)
Dept: PERIOP | Facility: HOSPITAL | Age: 63
End: 2018-11-09

## 2018-11-09 ENCOUNTER — HOSPITAL ENCOUNTER (OUTPATIENT)
Facility: HOSPITAL | Age: 63
Setting detail: HOSPITAL OUTPATIENT SURGERY
Discharge: HOME | End: 2018-11-09
Attending: SURGERY | Admitting: SURGERY

## 2018-11-09 VITALS
OXYGEN SATURATION: 98 % | HEART RATE: 70 BPM | DIASTOLIC BLOOD PRESSURE: 90 MMHG | TEMPERATURE: 98 F | RESPIRATION RATE: 16 BRPM | SYSTOLIC BLOOD PRESSURE: 148 MMHG

## 2018-11-09 DIAGNOSIS — K61.1 PERIRECTAL ABSCESS: ICD-10-CM

## 2018-11-09 DIAGNOSIS — K61.1 PERIRECTAL ABSCESS: Primary | ICD-10-CM

## 2018-11-09 PROBLEM — K60.30 ANAL FISTULA: Status: ACTIVE | Noted: 2018-11-09

## 2018-11-09 PROBLEM — K60.3 ANAL FISTULA: Status: ACTIVE | Noted: 2018-11-09

## 2018-11-09 PROCEDURE — 25010000002 MIDAZOLAM PER 1 MG: Performed by: ANESTHESIOLOGY

## 2018-11-09 PROCEDURE — 88304 TISSUE EXAM BY PATHOLOGIST: CPT | Performed by: SURGERY

## 2018-11-09 PROCEDURE — 11042 DBRDMT SUBQ TIS 1ST 20SQCM/<: CPT | Performed by: SURGERY

## 2018-11-09 PROCEDURE — 25010000002 FENTANYL CITRATE (PF) 100 MCG/2ML SOLUTION: Performed by: ANESTHESIOLOGY

## 2018-11-09 PROCEDURE — 25010000002 PROPOFOL 10 MG/ML EMULSION: Performed by: ANESTHESIOLOGY

## 2018-11-09 PROCEDURE — 25010000002 SUCCINYLCHOLINE PER 20 MG: Performed by: ANESTHESIOLOGY

## 2018-11-09 PROCEDURE — 25010000002 CEFOXITIN PER 1 G: Performed by: SURGERY

## 2018-11-09 PROCEDURE — 46060 I&D ISCHIORECTAL/NTRMRL ABSC: CPT | Performed by: SURGERY

## 2018-11-09 PROCEDURE — 25010000002 ONDANSETRON PER 1 MG: Performed by: ANESTHESIOLOGY

## 2018-11-09 PROCEDURE — 99024 POSTOP FOLLOW-UP VISIT: CPT | Performed by: SURGERY

## 2018-11-09 RX ORDER — MEPERIDINE HYDROCHLORIDE 25 MG/ML
12.5 INJECTION INTRAMUSCULAR; INTRAVENOUS; SUBCUTANEOUS ONCE
Status: DISCONTINUED | OUTPATIENT
Start: 2018-11-09 | End: 2018-11-09 | Stop reason: HOSPADM

## 2018-11-09 RX ORDER — PROPOFOL 10 MG/ML
VIAL (ML) INTRAVENOUS AS NEEDED
Status: DISCONTINUED | OUTPATIENT
Start: 2018-11-09 | End: 2018-11-09 | Stop reason: SURG

## 2018-11-09 RX ORDER — BUPIVACAINE HYDROCHLORIDE AND EPINEPHRINE 5; 5 MG/ML; UG/ML
INJECTION, SOLUTION PERINEURAL AS NEEDED
Status: DISCONTINUED | OUTPATIENT
Start: 2018-11-09 | End: 2018-11-09 | Stop reason: HOSPADM

## 2018-11-09 RX ORDER — EPHEDRINE SULFATE 50 MG/ML
5 INJECTION, SOLUTION INTRAVENOUS ONCE AS NEEDED
Status: DISCONTINUED | OUTPATIENT
Start: 2018-11-09 | End: 2018-11-09 | Stop reason: HOSPADM

## 2018-11-09 RX ORDER — MAGNESIUM HYDROXIDE 1200 MG/15ML
LIQUID ORAL AS NEEDED
Status: DISCONTINUED | OUTPATIENT
Start: 2018-11-09 | End: 2018-11-09 | Stop reason: HOSPADM

## 2018-11-09 RX ORDER — MIDAZOLAM HYDROCHLORIDE 1 MG/ML
2 INJECTION INTRAMUSCULAR; INTRAVENOUS
Status: DISCONTINUED | OUTPATIENT
Start: 2018-11-09 | End: 2018-11-09 | Stop reason: HOSPADM

## 2018-11-09 RX ORDER — LIDOCAINE HYDROCHLORIDE 10 MG/ML
0.5 INJECTION, SOLUTION EPIDURAL; INFILTRATION; INTRACAUDAL; PERINEURAL ONCE AS NEEDED
Status: DISCONTINUED | OUTPATIENT
Start: 2018-11-09 | End: 2018-11-09 | Stop reason: HOSPADM

## 2018-11-09 RX ORDER — ONDANSETRON 2 MG/ML
4 INJECTION INTRAMUSCULAR; INTRAVENOUS ONCE AS NEEDED
Status: DISCONTINUED | OUTPATIENT
Start: 2018-11-09 | End: 2018-11-09 | Stop reason: HOSPADM

## 2018-11-09 RX ORDER — DOCUSATE SODIUM 100 MG/1
100 CAPSULE, LIQUID FILLED ORAL 2 TIMES DAILY PRN
Status: DISCONTINUED | OUTPATIENT
Start: 2018-11-09 | End: 2018-11-09 | Stop reason: HOSPADM

## 2018-11-09 RX ORDER — SODIUM CHLORIDE, SODIUM LACTATE, POTASSIUM CHLORIDE, CALCIUM CHLORIDE 600; 310; 30; 20 MG/100ML; MG/100ML; MG/100ML; MG/100ML
9 INJECTION, SOLUTION INTRAVENOUS CONTINUOUS
Status: DISCONTINUED | OUTPATIENT
Start: 2018-11-09 | End: 2018-11-09 | Stop reason: HOSPADM

## 2018-11-09 RX ORDER — MORPHINE SULFATE 2 MG/ML
2 INJECTION, SOLUTION INTRAMUSCULAR; INTRAVENOUS
Status: DISCONTINUED | OUTPATIENT
Start: 2018-11-09 | End: 2018-11-09 | Stop reason: HOSPADM

## 2018-11-09 RX ORDER — LABETALOL HYDROCHLORIDE 5 MG/ML
5 INJECTION, SOLUTION INTRAVENOUS
Status: DISCONTINUED | OUTPATIENT
Start: 2018-11-09 | End: 2018-11-09 | Stop reason: HOSPADM

## 2018-11-09 RX ORDER — SODIUM CHLORIDE, SODIUM LACTATE, POTASSIUM CHLORIDE, CALCIUM CHLORIDE 600; 310; 30; 20 MG/100ML; MG/100ML; MG/100ML; MG/100ML
100 INJECTION, SOLUTION INTRAVENOUS CONTINUOUS
Status: DISCONTINUED | OUTPATIENT
Start: 2018-11-09 | End: 2018-11-09 | Stop reason: HOSPADM

## 2018-11-09 RX ORDER — OXYCODONE HYDROCHLORIDE AND ACETAMINOPHEN 5; 325 MG/1; MG/1
1 TABLET ORAL ONCE AS NEEDED
Status: DISCONTINUED | OUTPATIENT
Start: 2018-11-09 | End: 2018-11-09 | Stop reason: HOSPADM

## 2018-11-09 RX ORDER — ONDANSETRON 2 MG/ML
INJECTION INTRAMUSCULAR; INTRAVENOUS AS NEEDED
Status: DISCONTINUED | OUTPATIENT
Start: 2018-11-09 | End: 2018-11-09 | Stop reason: SURG

## 2018-11-09 RX ORDER — FAMOTIDINE 10 MG/ML
20 INJECTION, SOLUTION INTRAVENOUS ONCE
Status: COMPLETED | OUTPATIENT
Start: 2018-11-09 | End: 2018-11-09

## 2018-11-09 RX ORDER — PROMETHAZINE HYDROCHLORIDE 25 MG/1
12.5 TABLET ORAL ONCE AS NEEDED
Status: DISCONTINUED | OUTPATIENT
Start: 2018-11-09 | End: 2018-11-09 | Stop reason: HOSPADM

## 2018-11-09 RX ORDER — POLYETHYLENE GLYCOL 3350 17 G/17G
17 POWDER, FOR SOLUTION ORAL DAILY
Qty: 60 EACH | Refills: 1 | Status: SHIPPED | OUTPATIENT
Start: 2018-11-09 | End: 2018-11-16

## 2018-11-09 RX ORDER — NALOXONE HCL 0.4 MG/ML
0.4 VIAL (ML) INJECTION AS NEEDED
Status: DISCONTINUED | OUTPATIENT
Start: 2018-11-09 | End: 2018-11-09 | Stop reason: HOSPADM

## 2018-11-09 RX ORDER — MIDAZOLAM HYDROCHLORIDE 1 MG/ML
1 INJECTION INTRAMUSCULAR; INTRAVENOUS
Status: DISCONTINUED | OUTPATIENT
Start: 2018-11-09 | End: 2018-11-09 | Stop reason: HOSPADM

## 2018-11-09 RX ORDER — FENTANYL CITRATE 50 UG/ML
50 INJECTION, SOLUTION INTRAMUSCULAR; INTRAVENOUS
Status: DISCONTINUED | OUTPATIENT
Start: 2018-11-09 | End: 2018-11-09 | Stop reason: HOSPADM

## 2018-11-09 RX ORDER — PYRAZINAMIDE 500 MG/1
1 TABLET ORAL EVERY 4 HOURS PRN
Qty: 20 TABLET | Refills: 0 | Status: SHIPPED | OUTPATIENT
Start: 2018-11-09 | End: 2018-11-30

## 2018-11-09 RX ORDER — FENTANYL CITRATE 50 UG/ML
25 INJECTION, SOLUTION INTRAMUSCULAR; INTRAVENOUS
Status: DISCONTINUED | OUTPATIENT
Start: 2018-11-09 | End: 2018-11-09 | Stop reason: HOSPADM

## 2018-11-09 RX ORDER — HYDROCODONE BITARTRATE AND ACETAMINOPHEN 5; 325 MG/1; MG/1
1 TABLET ORAL ONCE AS NEEDED
Status: DISCONTINUED | OUTPATIENT
Start: 2018-11-09 | End: 2018-11-09 | Stop reason: HOSPADM

## 2018-11-09 RX ORDER — AMOXICILLIN 250 MG
2 CAPSULE ORAL DAILY PRN
Qty: 60 TABLET | Refills: 1 | Status: SHIPPED | OUTPATIENT
Start: 2018-11-09 | End: 2019-01-02 | Stop reason: SDUPTHER

## 2018-11-09 RX ORDER — SUCCINYLCHOLINE CHLORIDE 20 MG/ML
INJECTION INTRAMUSCULAR; INTRAVENOUS AS NEEDED
Status: DISCONTINUED | OUTPATIENT
Start: 2018-11-09 | End: 2018-11-09 | Stop reason: SURG

## 2018-11-09 RX ORDER — SODIUM CHLORIDE 0.9 % (FLUSH) 0.9 %
1-10 SYRINGE (ML) INJECTION AS NEEDED
Status: DISCONTINUED | OUTPATIENT
Start: 2018-11-09 | End: 2018-11-09 | Stop reason: HOSPADM

## 2018-11-09 RX ADMIN — ALFENTANIL HYDROCHLORIDE 500 MCG: 500 INJECTION INTRAVENOUS at 18:15

## 2018-11-09 RX ADMIN — PROPOFOL 100 MG: 10 INJECTION, EMULSION INTRAVENOUS at 18:00

## 2018-11-09 RX ADMIN — FENTANYL CITRATE 50 MCG: 50 INJECTION, SOLUTION INTRAMUSCULAR; INTRAVENOUS at 17:13

## 2018-11-09 RX ADMIN — FAMOTIDINE 20 MG: 10 INJECTION, SOLUTION INTRAVENOUS at 17:12

## 2018-11-09 RX ADMIN — LABETALOL HYDROCHLORIDE 5 MG: 5 INJECTION, SOLUTION INTRAVENOUS at 19:29

## 2018-11-09 RX ADMIN — OXYCODONE HYDROCHLORIDE AND ACETAMINOPHEN 1 TABLET: 5; 325 TABLET ORAL at 19:29

## 2018-11-09 RX ADMIN — PROPOFOL 100 MG: 10 INJECTION, EMULSION INTRAVENOUS at 18:15

## 2018-11-09 RX ADMIN — SODIUM CHLORIDE, POTASSIUM CHLORIDE, SODIUM LACTATE AND CALCIUM CHLORIDE 9 ML/HR: 600; 310; 30; 20 INJECTION, SOLUTION INTRAVENOUS at 17:13

## 2018-11-09 RX ADMIN — PROPOFOL 200 MG: 10 INJECTION, EMULSION INTRAVENOUS at 17:55

## 2018-11-09 RX ADMIN — FENTANYL CITRATE 25 MCG: 50 INJECTION, SOLUTION INTRAMUSCULAR; INTRAVENOUS at 19:36

## 2018-11-09 RX ADMIN — MIDAZOLAM 2 MG: 1 INJECTION INTRAMUSCULAR; INTRAVENOUS at 17:12

## 2018-11-09 RX ADMIN — ALFENTANIL HYDROCHLORIDE 1000 MCG: 500 INJECTION INTRAVENOUS at 17:45

## 2018-11-09 RX ADMIN — FENTANYL CITRATE 25 MCG: 50 INJECTION, SOLUTION INTRAMUSCULAR; INTRAVENOUS at 19:22

## 2018-11-09 RX ADMIN — CEFOXITIN SODIUM 2 G: 1 POWDER, FOR SOLUTION INTRAVENOUS at 17:56

## 2018-11-09 RX ADMIN — SUCCINYLCHOLINE CHLORIDE 160 MG: 20 INJECTION, SOLUTION INTRAMUSCULAR; INTRAVENOUS; PARENTERAL at 17:55

## 2018-11-09 RX ADMIN — ONDANSETRON 4 MG: 2 INJECTION INTRAMUSCULAR; INTRAVENOUS at 18:15

## 2018-11-09 RX ADMIN — SODIUM CHLORIDE, POTASSIUM CHLORIDE, SODIUM LACTATE AND CALCIUM CHLORIDE: 600; 310; 30; 20 INJECTION, SOLUTION INTRAVENOUS at 17:50

## 2018-11-09 RX ADMIN — ALFENTANIL HYDROCHLORIDE 500 MCG: 500 INJECTION INTRAVENOUS at 18:30

## 2018-11-09 NOTE — ANESTHESIA PROCEDURE NOTES
Airway  Urgency: elective    Date/Time: 11/9/2018 6:10 PM    General Information and Staff    Patient location during procedure: OR  Anesthesiologist: NINA YAO    Indications and Patient Condition  Indications for airway management: airway protection    Preoxygenated: yes  Mask difficulty assessment: 1 - vent by mask    Final Airway Details  Final airway type: endotracheal airway      Successful airway: ETT  Cuffed: yes   Successful intubation technique: direct laryngoscopy  Facilitating devices/methods: cricoid pressure  Blade: Rochelle  Blade size: 3  Cormack-Lehane Classification: grade IIa - partial view of glottis  Placement verified by: capnometry   Measured from: teeth  Number of attempts at approach: 1

## 2018-11-09 NOTE — INTERVAL H&P NOTE
H&P updated. The patient was examined and the following changes are noted:  Patient with persistent pain after incisional drainage. On exam he has an area of pain and indiration. We wiill plan for incisional drainage of perirectal abscess

## 2018-11-09 NOTE — OP NOTE
PREOPERATIVE DIAGNOSIS: Perirectal abscess.     POSTOPERATIVE DIAGNOSES:   1. Left posterior perirectal abscess.   2. Nonthrombosed external hemorrhoids.   3. Posterior midline anal fistula.   4. Right gluteal ulcer    PROCEDURES PERFORMED:   1. Rectal exam under anesthesia with perirectal abscess drainage and seton placement.   2. Debridement of right gluteal ulcer 2 x 2 centimeters by 0.7 cm deep    SURGEON: Román Mendoza MD    ANESTHESIA: General endotracheal anesthesia.     COMPLICATIONS: None.       ESTIMATED BLOOD LOSS: 10 mL.     FINDINGS:   1. Posterior midline anal fistula that involves the external anal sphincter.   2. Nonthrombosed external hemorrhoids.   3. Full-thickness right gluteal ulcer 2 x 2 centimeters by 0.7 cm deep    INDICATIONS FOR PROCEDURE: The patient is a very pleasant 63-year-old male who recently underwent incision and drainage of perirectal abscess in the emergency room. He came today to the office with recurrent pain in the perianal area. He was found to have a very tender area close to the prior drained abscess. Discussed with the patient about options. I recommended that he go undergo exam under anesthesia and possible perirectal abscess drainage. Risks and benefits of the procedure including bleeding and infection were discussed in detail with the patient who verbalized understanding and agreed with the plan.     DESCRIPTION OF PROCEDURE: The patient was taken to the operating room where he was placed on the OR table in the supine position. Preoperative antibiotics were given and SCDs were placed. The patient was placed in a candy cane and lithotomy position.     He was prepped and draped in the usual sterile fashion. Timeout was performed and the patient was correctly identified. The patient underwent perianal examination that revealed evidence of a prior drained abscess and an incision in the left posterior side of the anal canal approximately at 5-o'clock. There was evidence of  external hemorrhoids over the left medial aspect of the anus that were nonthrombosed. There was evidence of a full-thickness ulcer in the right gluteal area.     Digital rectal examination was performed that showed no masses in the rectal wall and the vault was full of stool. At the posterior midline there was evidence of a fistula. I then proceeded to examine the anal canal. At the posterior midline there was a fistula that was approximately 2 cm away from the anoderm. I then proceeded to incise the area of the prior abscess and make a bigger incision. There was a minimal amount of purulent fluid that was drained. At this time I decided to probe the abscess and there was a fistulous communication between the anus and the skin that involved a large part of the external anal sphincter. I decided to resect the skin over the external anal sphincter and a cutting seton was placed with 0 silk suture. The wound was then irrigated and the perianal incision was packed with Iodoform gauze. The full-thickness ulcer was circumferentially dissected and tissue was sent for pathology.  The specimen measured2 x 2 centimeters by 0.7 cm deep and involved subcutaneous tissue but no muscle. The wound was packed with Iodoform gauze. Incisions were covered with 4 x 4 and mesh panties. The patient tolerated the procedure well and he was sent to the recovery area in stable condition.     The patient will need several trips to the OR for treatment of fistula

## 2018-11-09 NOTE — H&P (VIEW-ONLY)
Consultation note    Referring physician: Dr. Escobar     Consulting Physician: Román Mendoza MD    Reason for consultation: Perirectal abscess    Chief Complaint   Patient presents with   • Hemorrhoids       HPI:   The patient is a very pleasant 63 y.o. years old male that presented to the hospital with pain for 4 days.  The patient came from a long trip where he drove for 14 hours and developed diarrhea.  For the past 4 days he has been having perirectal pain.  He finally was related to hemorrhoids and used heating pads and sitz bath.  This didn't relieve his pain so he decided to come to the emergency room.  He reports no fevers no chills.  He reports constipation no nausea or vomiting.  He has history of prior perirectal abscess drainage.  He does not have any history of perianal fistula       Past Medical History:   Diagnosis Date   • Allergic    • Atrial fibrillation (CMS/HCC)    • Dizziness    • Hyperlipidemia    • Hypertension    • Low back pain    • Sleep apnea    • SOB (shortness of breath)        Past Surgical History:   Procedure Laterality Date   • CERVICAL DISC SURGERY     • SHOULDER SURGERY         No current facility-administered medications for this encounter.     Current Outpatient Prescriptions:   •  hydrocortisone (ANUSOL-HC) 2.5 % rectal cream, Insert  into the rectum 2 (Two) Times a Day for 7 days., Disp: 1 each, Rfl: 0  •  metoprolol tartrate (LOPRESSOR) 25 MG tablet, Take 0.5 tablets by mouth Every 12 (Twelve) Hours., Disp: 180 tablet, Rfl: 3  •  mupirocin (BACTROBAN) 2 % ointment, Apply  topically to the appropriate area as directed 3 (Three) Times a Day., Disp: 1 g, Rfl: 0  •  propafenone (RYTHMOL) 225 MG tablet, TAKE ONE TABLET BY MOUTH EVERY 8 HOURS, Disp: 270 tablet, Rfl: 1  •  rosuvastatin (CRESTOR) 10 MG tablet, Take 0.5 tablets by mouth Every Night., Disp: , Rfl:   •  XARELTO 20 MG tablet, TAKE ONE TABLET BY MOUTH DAILY, Disp: 90 tablet, Rfl: 2    No Known Allergies    Social  History     Social History   • Marital status:      Spouse name: N/A   • Number of children: N/A   • Years of education: N/A     Occupational History   • Not on file.     Social History Main Topics   • Smoking status: Former Smoker     Packs/day: 0.25     Years: 10.00     Types: Cigarettes     Quit date: 2007   • Smokeless tobacco: Former User      Comment: daily caffiene   • Alcohol use Yes      Comment: social   • Drug use: No   • Sexual activity: Defer     Other Topics Concern   • Not on file     Social History Narrative   • No narrative on file       Family History   Problem Relation Age of Onset   • Heart disease Father        ROS:   Constitutional: denies any weight changes, fatigue or weakness.  Eyes: : denies blurred or double vision  Cardiovascular: denies chest pain, palpitations, edemas.  Respiratory: denies cough, sputum, SOB.  Gastrointestinal: denies N&V, abd pain, diarrhea,reports constipation.    Physical Exam:   Vitals:    11/07/18 1344   BP: 145/82   Pulse: 71   Resp: 18   Temp:    SpO2: 93%     GENERAL:alert, well appearing, and in no distress and oriented to person, place, and time  CHEST: clear to auscultation, no wheezes, rales or rhonchi, symmetric air entry  CARDIAC: regular rate and rhythm    ABDOMEN: soft, nontender, nondistended, no masses or organomegaly  Over the perianal area there several skin tags.  Over the right lateral area there is swelling and inflammation as well as fluctuance.  There is no drainage place a small area of necrosis of the skin.  He has 2 burns over the gluteal area that at approximately 1 x 1 cm.  There is no drainage of infection    Diagnostic workup:   CT scan abdomen and pelvis was review showing evidence of perirectal abscess.      Assessment and plan:   The patient is a very pleasant 63 y.o. years old male with perirectal abscess.  Discussed with him about treatment options.  I recommend that he undergoes incision and drainage under local anesthesia at  the emergency room.  Risk and benefits of the procedure including bleeding infections were discussed in detail with the patient that verbalized understanding and agree with the plan    - Plan for incision and drainage of left perirectal abscess bedside    Román Mendoza MD  General, Minimally Invasive and Endoscopic Surgery  [facility]    [address], [address]  [place], [place], [address]  P: [phone]  F: [phone]

## 2018-11-09 NOTE — BRIEF OP NOTE
INCISION AND DRAINAGE OF PERIRECTAL ABSCESS  Progress Note    Hoang So  11/9/2018    Pre-op Diagnosis:   Perirectal abscess [K61.1]       Post-Op Diagnosis Codes:     * Perirectal abscess [K61.1]    Procedure/CPT® Codes:      Procedure(s):  INCISION AND DRAINAGE OF PERIRECTAL ABSCESS AND SETON PLACEMENT AND DEBRIDEMENT OF RIGHT GLUTEAL ULCER    Surgeon(s):  Román Mendoza MD    Anesthesia: General    Staff:   Circulator: Tamie Camejo RN  Scrub Person: Laura Way    Estimated Blood Loss: minimal    Urine Voided: * No values recorded between 11/9/2018  5:52 PM and 11/9/2018  6:50 PM *    Specimens:                ID Type Source Tests Collected by Time   A : right gluteal ulcer Tissue Buttock, Right TISSUE PATHOLOGY EXAM Román Mendoza MD 11/9/2018 1839         Drains:  Seton    Findings: Posterior midline anal fistula, external hemorrhoids, left perianal abscess, right gluteal ulcer     Complications: None      Román Mendoza MD     Date: 11/9/2018  Time: 6:54 PM

## 2018-11-09 NOTE — ANESTHESIA PREPROCEDURE EVALUATION
Anesthesia Evaluation     NPO Solid Status: Waived due to emergency  NPO Liquid Status: Waived due to emergency           Airway   Mallampati: II  TM distance: >3 FB  Neck ROM: full  Possible difficult intubation and Large neck circumference  Dental - normal exam   (+) poor dentition    Pulmonary     breath sounds clear to auscultation  (+) shortness of breath, sleep apnea,   (-) decreased breath sounds, wheezes  Cardiovascular - normal exam    Rhythm: regular  Rate: normal    (+) hypertension, dysrhythmias Atrial Fib, hyperlipidemia,       Neuro/Psych  (+) dizziness/light headedness, numbness,     GI/Hepatic/Renal/Endo    (+) morbid obesity,      Musculoskeletal     (+) neck pain, neck stiffness, radiculopathy  Abdominal  - normal exam   Substance History      OB/GYN          Other   (+) arthritis                     Anesthesia Plan    ASA 4     general     intravenous induction   Anesthetic plan, all risks, benefits, and alternatives have been provided, discussed and informed consent has been obtained with: patient.    Plan discussed with CRNA.

## 2018-11-10 NOTE — ANESTHESIA POSTPROCEDURE EVALUATION
Patient: Hoang So    Procedure Summary     Date:  11/09/18 Room / Location:  [facility] OR  / [facility] MAIN OR    Anesthesia Start:  1753 Anesthesia Stop:  1900    Procedure:  INCISION AND DRAINAGE OF PERIRECTAL ABSCESS AND SETON PLACEMENT AND DEBRIDEMENT OF RIGHT GLUTEAL ULCER (N/A Perirectal) Diagnosis:       Perirectal abscess      (Perirectal abscess [K61.1])    Surgeon:  Román Mendoza MD Provider:  Sabra Deng MD    Anesthesia Type:  general ASA Status:  4          Anesthesia Type: general  Last vitals  BP   148/90 (11/09/18 2000)   Temp   36.7 °C (98 °F) (11/09/18 2000)   Pulse   70 (11/09/18 2000)   Resp   16 (11/09/18 2000)     SpO2   98 % (11/09/18 2000)     Post Anesthesia Care and Evaluation      Comments: Patient discharged before being evaluated by an Anesthesiologist.  No apparent complications per the record.  THIS CASE WAS NOT MEDICALLY DIRECTED

## 2018-11-12 LAB
CYTO UR: NORMAL
LAB AP CASE REPORT: NORMAL
PATH REPORT.FINAL DX SPEC: NORMAL
PATH REPORT.GROSS SPEC: NORMAL

## 2018-11-12 NOTE — PROGRESS NOTES
CC: Follow-up perirectal abscess    HPI: The patient is a very pleasant 63-year-old male that underwent incision and drainage of perirectal abscess at the emergency room last Wednesday.  He is here today for follow-up.  He has been having perirectal pain and purulent drainage.  The pain initially improved but has returned almost to the same level as before.  He reports no fevers or chills.  Reports constipation.  He has prior excision of perianal Boils    Past medical and surgical history: Atrial fibrillation, dizziness, hyperlipidemia, hypertension, sleep apnea, neck surgery and shoulder surgery, incision and drainage of perirectal abscess    Medications and allergies were reviewed in Epic: He takes xarelto    Social history; the patient is  and does not smoke.  Former smoker, quit 2007, drinks alcohol socially and denies any drug use    Family history: Father with heart disease    ROS:   Constitutional: denies any weight changes, fatigue or weakness.  Eyes: : denies blurred or double vision  Cardiovascular: denies chest pain, palpitations, edemas.  Respiratory: denies cough, sputum, SOB.  Gastrointestinal: denies N&V, abd pain, diarrhea,reports constipation.    Physical exam  Alert and oriented ×3, in mild distress due to perirectal pain  Chest clear bilaterally,  Over the perianal area there is an open wound over the right lateral aspect.  There is tenderness just superior to the open area.  There is no fluctuance but there is redness over the perianal skin.  There is evidence of nonthrombosed external hemorrhoids    Assessment and plan    The patient is a very pleasant 63-year-old male status post incision and drainage of perirectal abscess.  He continues to have pain and on physical examination he has an area of tenderness and redness that may be an extension of the prior drain abscess.  I recommend that he undergoes rectal exam under anesthesia and possible abscess drainage.  We will assess for fistulas  at that time.  The patient is a full anticoagulation and is at high risk for bleeding.   Risk of bleeding, infection, fecal incontinence were discussed in detail with the patient that verbalized understanding and agreed with the plan.    - Plan for exam under anesthesia, possible perirectal abscess drainage today  - Continue antibiotics  - Will need to start a bowel regimen with MiraLAX, Senokot and fiber on a daily basis    Román Mendoza MD, FACS  General, Minimally Invasive and Endoscopic Surgery  [facility]    [address] [place] [address], [address]  [place], [place], [address]  P: [phone]  F: [phone]

## 2018-11-16 ENCOUNTER — OFFICE VISIT (OUTPATIENT)
Dept: SURGERY | Facility: CLINIC | Age: 63
End: 2018-11-16

## 2018-11-16 DIAGNOSIS — Z09 POSTOP CHECK: Primary | ICD-10-CM

## 2018-11-16 PROCEDURE — 99024 POSTOP FOLLOW-UP VISIT: CPT | Performed by: SURGERY

## 2018-11-16 RX ORDER — POLYETHYLENE GLYCOL 3350 17 G/17G
17 POWDER, FOR SOLUTION ORAL DAILY
Qty: 60 EACH | Refills: 1 | Status: SHIPPED | OUTPATIENT
Start: 2018-11-16 | End: 2019-09-11

## 2018-11-16 NOTE — PROGRESS NOTES
CC: Postop check    History of present illness: The patient is a very pleasant 63-year-old male that recently underwent perirectal abscess drainage with seton placement for perianal fistula and debridement of gluteal burn wound.  He has been doing fine but he complains of pain over the debrided gluteal wound.  He denies any drainage.  He has been performing sitz bath 3 times a day.  He denies any purulent drainage from the perianal area.  He denies any fecal incontinence    Objective:   Alert and oriented ×3, no acute distress  Over the right gluteal area there is a 2 x 2 centimeter round open incision that is 0.5 cm deep and he has some granulation tissue at the base with small amount of fibrinous exudate.  Over the left perianal area there is an open wound with a seton in place.  The base of the wound is moist but there is no evidence of purulent drainage or infections    Pathology:   Final Diagnosis   1.  Skin, Right Gluteal, Excisional Biopsy: Skin and subcutaneous tissue with:                A.  Epidermal ulceration, ischemic degeneration and marked acute inflammation.       Assessment and plan    The patient is a very pleasant 63-year-old male with posterior transsphincteric perianal fistula status post abscess drainage and seton placement and right gluteal burn status post debridement.  Patient has a posterior midline fistula that involves large part of the external and an sphincter.  Discussed with him about the need to perform several procedures to slowly let the seton cut through the muscle without causing fecal incontinence.  He is having more pain over the right gluteal wound that is healing fine.    - I recommend that he applies antibiotic ointment and wash the wound and perianal area 3 times a day with sitz bath.  Apply dry dressing over the wounds  - Take stool softer nurse and MiraLAX for constipation.  Take pain medication as needed  - Follow-up in my office in 2 weeks for wound check    Patient and  family members verbalized understanding and agreed with the plan    Román Mendoza MD, FACS  General, Minimally Invasive and Endoscopic Surgery  [facility]    [address], [address]  [place], [place], [address]  P: [phone]  F: [phone]

## 2018-11-30 ENCOUNTER — OFFICE VISIT (OUTPATIENT)
Dept: SURGERY | Facility: CLINIC | Age: 63
End: 2018-11-30

## 2018-11-30 DIAGNOSIS — K60.3 PERIANAL FISTULA: Primary | ICD-10-CM

## 2018-11-30 PROBLEM — K60.30 PERIANAL FISTULA: Status: ACTIVE | Noted: 2018-11-30

## 2018-11-30 PROCEDURE — 99212 OFFICE O/P EST SF 10 MIN: CPT | Performed by: SURGERY

## 2018-11-30 NOTE — PROGRESS NOTES
CC: Follow-up perirectal abscess     History of present illness: The patient is a very pleasant 63-year-old male that recently underwent perirectal abscess drainage with seton placement for perianal fistula and debridement of gluteal burn wound.  He reports the pain over the gluteal wound has significantly improved.  He has intermittent episodes of anal pain that lasts for a very short period.  Reports minimal drainage over the area.    Past medical and surgical history: Atrial fibrillation, dizziness, hyperlipidemia, hypertension, sleep apnea, neck surgery and shoulder surgery, incision and drainage of perirectal abscess     Medications and allergies were reviewed in Epic: He takes xarelto     Social history; the patient is  and does not smoke.  Former smoker, quit 2007, drinks alcohol socially and denies any drug use     Family history: Father with heart disease     ROS:   Constitutional: denies any weight changes, fatigue or weakness.  Eyes: : denies blurred or double vision  Cardiovascular: denies chest pain, palpitations, edemas.  Respiratory: denies cough, sputum, SOB.    Objective:   Alert and oriented ×3, no acute distress  Over the right gluteal area there is a 2 x 2 centimeter round open incision that is 0.2 cm deep and he has some granulation tissue at the base with small amount of fibrinous exudate.  Over the left perianal area there is an open wound with a seton in place.  The base of the wound has granulation tissue and is healing great    Assessment and plan    63-year-old male with perirectal abscess with perianal fistula involving the external anal sphincter and right gluteal wound that is slowly healing.  All his wounds are healing great and there is no signs of ongoing infection.  Discussed with the patient about further steps.  I recommend that he undergoes rectal exam under anesthesia and possible fistulotomy versus seton exchange in approximately 3 weeks.  Risk of bleeding, infections,  fecal incontinence were discussed in detail with the patient that verbalized understanding and agree with the plan    - Patient to be off xarelto for 2 days prior to the procedure.  He understands the increased risk of stroke during that time  - Plan for exam under anesthesia and possible fistulotomy versus seton placement    Patient verbalized understanding and agreed with the plan    Román Mendoza MD, FACS  General, Minimally Invasive and Endoscopic Surgery  [facility]    [address], [address]  [place], [place], [address]  P: [phone]  F: [phone]

## 2018-12-14 RX ORDER — ACETAMINOPHEN 500 MG
1000 TABLET ORAL EVERY 6 HOURS PRN
COMMUNITY

## 2018-12-14 RX ORDER — PROPAFENONE HYDROCHLORIDE 225 MG/1
225 TABLET, FILM COATED ORAL EVERY 8 HOURS
COMMUNITY
End: 2019-08-28

## 2018-12-17 ENCOUNTER — ANESTHESIA EVENT (OUTPATIENT)
Dept: PERIOP | Facility: HOSPITAL | Age: 63
End: 2018-12-17

## 2018-12-17 ENCOUNTER — ANESTHESIA (OUTPATIENT)
Dept: PERIOP | Facility: HOSPITAL | Age: 63
End: 2018-12-17

## 2018-12-17 ENCOUNTER — HOSPITAL ENCOUNTER (OUTPATIENT)
Facility: HOSPITAL | Age: 63
Setting detail: HOSPITAL OUTPATIENT SURGERY
Discharge: HOME | End: 2018-12-17
Attending: SURGERY | Admitting: SURGERY

## 2018-12-17 VITALS
HEART RATE: 75 BPM | TEMPERATURE: 97.8 F | RESPIRATION RATE: 20 BRPM | DIASTOLIC BLOOD PRESSURE: 77 MMHG | HEIGHT: 72 IN | OXYGEN SATURATION: 93 % | SYSTOLIC BLOOD PRESSURE: 146 MMHG | WEIGHT: 294.38 LBS | BODY MASS INDEX: 39.87 KG/M2

## 2018-12-17 DIAGNOSIS — K60.3 PERIANAL FISTULA: ICD-10-CM

## 2018-12-17 PROBLEM — K60.30 PERIANAL FISTULA: Status: RESOLVED | Noted: 2018-11-30 | Resolved: 2018-12-17

## 2018-12-17 LAB
ANION GAP SERPL CALCULATED.3IONS-SCNC: 12.3 MMOL/L
BASOPHILS # BLD AUTO: 0.12 10*3/MM3 (ref 0–0.2)
BASOPHILS NFR BLD AUTO: 1.4 % (ref 0–1.5)
BUN BLD-MCNC: 17 MG/DL (ref 8–23)
BUN/CREAT SERPL: 15.2 (ref 7–25)
CALCIUM SPEC-SCNC: 9.8 MG/DL (ref 8.6–10.5)
CHLORIDE SERPL-SCNC: 104 MMOL/L (ref 98–107)
CO2 SERPL-SCNC: 24.7 MMOL/L (ref 22–29)
CREAT BLD-MCNC: 1.12 MG/DL (ref 0.76–1.27)
DEPRECATED RDW RBC AUTO: 46.1 FL (ref 37–54)
EOSINOPHIL # BLD AUTO: 0.24 10*3/MM3 (ref 0–0.7)
EOSINOPHIL NFR BLD AUTO: 2.8 % (ref 0.3–6.2)
ERYTHROCYTE [DISTWIDTH] IN BLOOD BY AUTOMATED COUNT: 13.2 % (ref 11.5–14.5)
GFR SERPL CREATININE-BSD FRML MDRD: 66 ML/MIN/1.73
GLUCOSE BLD-MCNC: 102 MG/DL (ref 65–99)
HCT VFR BLD AUTO: 45.9 % (ref 40.4–52.2)
HGB BLD-MCNC: 14.8 G/DL (ref 13.7–17.6)
IMM GRANULOCYTES # BLD: 0.03 10*3/MM3 (ref 0–0.03)
IMM GRANULOCYTES NFR BLD: 0.4 % (ref 0–0.5)
LYMPHOCYTES # BLD AUTO: 3.03 10*3/MM3 (ref 0.9–4.8)
LYMPHOCYTES NFR BLD AUTO: 35.6 % (ref 19.6–45.3)
MCH RBC QN AUTO: 30.8 PG (ref 27–32.7)
MCHC RBC AUTO-ENTMCNC: 32.2 G/DL (ref 32.6–36.4)
MCV RBC AUTO: 95.4 FL (ref 79.8–96.2)
MONOCYTES # BLD AUTO: 0.83 10*3/MM3 (ref 0.2–1.2)
MONOCYTES NFR BLD AUTO: 9.8 % (ref 5–12)
NEUTROPHILS # BLD AUTO: 4.25 10*3/MM3 (ref 1.9–8.1)
NEUTROPHILS NFR BLD AUTO: 50 % (ref 42.7–76)
NRBC BLD MANUAL-RTO: 0 /100 WBC (ref 0–0)
PLATELET # BLD AUTO: 206 10*3/MM3 (ref 140–500)
PMV BLD AUTO: 10.3 FL (ref 6–12)
POTASSIUM BLD-SCNC: 4.3 MMOL/L (ref 3.5–5.2)
RBC # BLD AUTO: 4.81 10*6/MM3 (ref 4.6–6)
SODIUM BLD-SCNC: 141 MMOL/L (ref 136–145)
WBC NRBC COR # BLD: 8.5 10*3/MM3 (ref 4.5–10.7)

## 2018-12-17 PROCEDURE — 25010000002 FENTANYL CITRATE (PF) 100 MCG/2ML SOLUTION: Performed by: NURSE ANESTHETIST, CERTIFIED REGISTERED

## 2018-12-17 PROCEDURE — 25010000002 HYDROMORPHONE PER 4 MG: Performed by: NURSE ANESTHETIST, CERTIFIED REGISTERED

## 2018-12-17 PROCEDURE — 88304 TISSUE EXAM BY PATHOLOGIST: CPT | Performed by: SURGERY

## 2018-12-17 PROCEDURE — 25010000002 MIDAZOLAM PER 1 MG: Performed by: ANESTHESIOLOGY

## 2018-12-17 PROCEDURE — 25010000002 PROPOFOL 10 MG/ML EMULSION: Performed by: NURSE ANESTHETIST, CERTIFIED REGISTERED

## 2018-12-17 PROCEDURE — 25010000002 DEXAMETHASONE PER 1 MG: Performed by: NURSE ANESTHETIST, CERTIFIED REGISTERED

## 2018-12-17 PROCEDURE — 25010000002 ONDANSETRON PER 1 MG: Performed by: NURSE ANESTHETIST, CERTIFIED REGISTERED

## 2018-12-17 PROCEDURE — 85025 COMPLETE CBC W/AUTO DIFF WBC: CPT | Performed by: SURGERY

## 2018-12-17 PROCEDURE — 80048 BASIC METABOLIC PNL TOTAL CA: CPT | Performed by: SURGERY

## 2018-12-17 PROCEDURE — 46258 REMOVE IN/EX HEM GRP W/FISTU: CPT | Performed by: SURGERY

## 2018-12-17 RX ORDER — OXYCODONE AND ACETAMINOPHEN 7.5; 325 MG/1; MG/1
1 TABLET ORAL ONCE AS NEEDED
Status: DISCONTINUED | OUTPATIENT
Start: 2018-12-17 | End: 2018-12-17 | Stop reason: HOSPADM

## 2018-12-17 RX ORDER — PROMETHAZINE HYDROCHLORIDE 25 MG/ML
12.5 INJECTION, SOLUTION INTRAMUSCULAR; INTRAVENOUS ONCE AS NEEDED
Status: DISCONTINUED | OUTPATIENT
Start: 2018-12-17 | End: 2018-12-17 | Stop reason: HOSPADM

## 2018-12-17 RX ORDER — BUPIVACAINE HYDROCHLORIDE AND EPINEPHRINE 5; 5 MG/ML; UG/ML
INJECTION, SOLUTION PERINEURAL AS NEEDED
Status: DISCONTINUED | OUTPATIENT
Start: 2018-12-17 | End: 2018-12-17 | Stop reason: HOSPADM

## 2018-12-17 RX ORDER — DEXAMETHASONE SODIUM PHOSPHATE 10 MG/ML
INJECTION INTRAMUSCULAR; INTRAVENOUS AS NEEDED
Status: DISCONTINUED | OUTPATIENT
Start: 2018-12-17 | End: 2018-12-17 | Stop reason: SURG

## 2018-12-17 RX ORDER — MIDAZOLAM HYDROCHLORIDE 1 MG/ML
2 INJECTION INTRAMUSCULAR; INTRAVENOUS
Status: DISCONTINUED | OUTPATIENT
Start: 2018-12-17 | End: 2018-12-17 | Stop reason: HOSPADM

## 2018-12-17 RX ORDER — PROMETHAZINE HYDROCHLORIDE 25 MG/1
12.5 TABLET ORAL ONCE AS NEEDED
Status: DISCONTINUED | OUTPATIENT
Start: 2018-12-17 | End: 2018-12-17 | Stop reason: HOSPADM

## 2018-12-17 RX ORDER — FLUMAZENIL 0.1 MG/ML
0.2 INJECTION INTRAVENOUS AS NEEDED
Status: DISCONTINUED | OUTPATIENT
Start: 2018-12-17 | End: 2018-12-17 | Stop reason: HOSPADM

## 2018-12-17 RX ORDER — LABETALOL HYDROCHLORIDE 5 MG/ML
5 INJECTION, SOLUTION INTRAVENOUS
Status: DISCONTINUED | OUTPATIENT
Start: 2018-12-17 | End: 2018-12-17 | Stop reason: HOSPADM

## 2018-12-17 RX ORDER — ACETAMINOPHEN 325 MG/1
650 TABLET ORAL ONCE AS NEEDED
Status: DISCONTINUED | OUTPATIENT
Start: 2018-12-17 | End: 2018-12-17 | Stop reason: HOSPADM

## 2018-12-17 RX ORDER — SODIUM CHLORIDE 0.9 % (FLUSH) 0.9 %
3-10 SYRINGE (ML) INJECTION AS NEEDED
Status: DISCONTINUED | OUTPATIENT
Start: 2018-12-17 | End: 2018-12-17 | Stop reason: HOSPADM

## 2018-12-17 RX ORDER — DIPHENHYDRAMINE HYDROCHLORIDE 50 MG/ML
12.5 INJECTION INTRAMUSCULAR; INTRAVENOUS
Status: DISCONTINUED | OUTPATIENT
Start: 2018-12-17 | End: 2018-12-17 | Stop reason: HOSPADM

## 2018-12-17 RX ORDER — DIPHENHYDRAMINE HCL 25 MG
25 CAPSULE ORAL
Status: DISCONTINUED | OUTPATIENT
Start: 2018-12-17 | End: 2018-12-17 | Stop reason: HOSPADM

## 2018-12-17 RX ORDER — DOCUSATE SODIUM 100 MG/1
100 CAPSULE, LIQUID FILLED ORAL 2 TIMES DAILY PRN
Status: DISCONTINUED | OUTPATIENT
Start: 2018-12-17 | End: 2018-12-17 | Stop reason: HOSPADM

## 2018-12-17 RX ORDER — TAMSULOSIN HYDROCHLORIDE 0.4 MG/1
1 CAPSULE ORAL NIGHTLY
Qty: 15 CAPSULE | Refills: 0 | Status: SHIPPED | OUTPATIENT
Start: 2018-12-17 | End: 2019-08-28

## 2018-12-17 RX ORDER — SODIUM CHLORIDE 0.9 % (FLUSH) 0.9 %
3 SYRINGE (ML) INJECTION EVERY 12 HOURS SCHEDULED
Status: DISCONTINUED | OUTPATIENT
Start: 2018-12-17 | End: 2018-12-17 | Stop reason: HOSPADM

## 2018-12-17 RX ORDER — HYDRALAZINE HYDROCHLORIDE 20 MG/ML
5 INJECTION INTRAMUSCULAR; INTRAVENOUS
Status: DISCONTINUED | OUTPATIENT
Start: 2018-12-17 | End: 2018-12-17 | Stop reason: HOSPADM

## 2018-12-17 RX ORDER — GLYCOPYRROLATE 0.2 MG/ML
INJECTION INTRAMUSCULAR; INTRAVENOUS AS NEEDED
Status: DISCONTINUED | OUTPATIENT
Start: 2018-12-17 | End: 2018-12-17 | Stop reason: SURG

## 2018-12-17 RX ORDER — MAGNESIUM HYDROXIDE 1200 MG/15ML
LIQUID ORAL AS NEEDED
Status: DISCONTINUED | OUTPATIENT
Start: 2018-12-17 | End: 2018-12-17 | Stop reason: HOSPADM

## 2018-12-17 RX ORDER — EPHEDRINE SULFATE 50 MG/ML
5 INJECTION, SOLUTION INTRAVENOUS ONCE AS NEEDED
Status: DISCONTINUED | OUTPATIENT
Start: 2018-12-17 | End: 2018-12-17 | Stop reason: HOSPADM

## 2018-12-17 RX ORDER — FAMOTIDINE 10 MG/ML
20 INJECTION, SOLUTION INTRAVENOUS ONCE
Status: COMPLETED | OUTPATIENT
Start: 2018-12-17 | End: 2018-12-17

## 2018-12-17 RX ORDER — PROMETHAZINE HYDROCHLORIDE 12.5 MG/1
12.5 TABLET ORAL EVERY 6 HOURS PRN
Qty: 10 TABLET | Refills: 0 | Status: SHIPPED | OUTPATIENT
Start: 2018-12-17 | End: 2019-08-28

## 2018-12-17 RX ORDER — HYDROMORPHONE HYDROCHLORIDE 1 MG/ML
0.5 INJECTION, SOLUTION INTRAMUSCULAR; INTRAVENOUS; SUBCUTANEOUS
Status: DISCONTINUED | OUTPATIENT
Start: 2018-12-17 | End: 2018-12-17 | Stop reason: HOSPADM

## 2018-12-17 RX ORDER — PROMETHAZINE HYDROCHLORIDE 25 MG/1
25 TABLET ORAL ONCE AS NEEDED
Status: DISCONTINUED | OUTPATIENT
Start: 2018-12-17 | End: 2018-12-17 | Stop reason: HOSPADM

## 2018-12-17 RX ORDER — MIDAZOLAM HYDROCHLORIDE 1 MG/ML
1 INJECTION INTRAMUSCULAR; INTRAVENOUS
Status: DISCONTINUED | OUTPATIENT
Start: 2018-12-17 | End: 2018-12-17 | Stop reason: HOSPADM

## 2018-12-17 RX ORDER — ROCURONIUM BROMIDE 10 MG/ML
INJECTION, SOLUTION INTRAVENOUS AS NEEDED
Status: DISCONTINUED | OUTPATIENT
Start: 2018-12-17 | End: 2018-12-17 | Stop reason: SURG

## 2018-12-17 RX ORDER — FENTANYL CITRATE 50 UG/ML
50 INJECTION, SOLUTION INTRAMUSCULAR; INTRAVENOUS
Status: DISCONTINUED | OUTPATIENT
Start: 2018-12-17 | End: 2018-12-17 | Stop reason: HOSPADM

## 2018-12-17 RX ORDER — ONDANSETRON 2 MG/ML
4 INJECTION INTRAMUSCULAR; INTRAVENOUS ONCE AS NEEDED
Status: COMPLETED | OUTPATIENT
Start: 2018-12-17 | End: 2018-12-17

## 2018-12-17 RX ORDER — FENTANYL CITRATE 50 UG/ML
INJECTION, SOLUTION INTRAMUSCULAR; INTRAVENOUS AS NEEDED
Status: DISCONTINUED | OUTPATIENT
Start: 2018-12-17 | End: 2018-12-17 | Stop reason: SURG

## 2018-12-17 RX ORDER — HYDROMORPHONE HCL 110MG/55ML
PATIENT CONTROLLED ANALGESIA SYRINGE INTRAVENOUS AS NEEDED
Status: DISCONTINUED | OUTPATIENT
Start: 2018-12-17 | End: 2018-12-17 | Stop reason: SURG

## 2018-12-17 RX ORDER — LIDOCAINE HYDROCHLORIDE 20 MG/ML
INJECTION, SOLUTION INFILTRATION; PERINEURAL AS NEEDED
Status: DISCONTINUED | OUTPATIENT
Start: 2018-12-17 | End: 2018-12-17 | Stop reason: SURG

## 2018-12-17 RX ORDER — HYDROCODONE BITARTRATE AND ACETAMINOPHEN 5; 325 MG/1; MG/1
1-2 TABLET ORAL EVERY 6 HOURS PRN
Qty: 30 TABLET | Refills: 0 | Status: SHIPPED | OUTPATIENT
Start: 2018-12-17 | End: 2019-01-02

## 2018-12-17 RX ORDER — PROPOFOL 10 MG/ML
VIAL (ML) INTRAVENOUS AS NEEDED
Status: DISCONTINUED | OUTPATIENT
Start: 2018-12-17 | End: 2018-12-17 | Stop reason: SURG

## 2018-12-17 RX ORDER — ONDANSETRON 2 MG/ML
INJECTION INTRAMUSCULAR; INTRAVENOUS AS NEEDED
Status: DISCONTINUED | OUTPATIENT
Start: 2018-12-17 | End: 2018-12-17 | Stop reason: SURG

## 2018-12-17 RX ORDER — NALOXONE HCL 0.4 MG/ML
0.2 VIAL (ML) INJECTION AS NEEDED
Status: DISCONTINUED | OUTPATIENT
Start: 2018-12-17 | End: 2018-12-17 | Stop reason: HOSPADM

## 2018-12-17 RX ORDER — HYDROCODONE BITARTRATE AND ACETAMINOPHEN 7.5; 325 MG/1; MG/1
1 TABLET ORAL ONCE AS NEEDED
Status: COMPLETED | OUTPATIENT
Start: 2018-12-17 | End: 2018-12-17

## 2018-12-17 RX ORDER — SODIUM CHLORIDE, SODIUM LACTATE, POTASSIUM CHLORIDE, CALCIUM CHLORIDE 600; 310; 30; 20 MG/100ML; MG/100ML; MG/100ML; MG/100ML
9 INJECTION, SOLUTION INTRAVENOUS CONTINUOUS
Status: DISCONTINUED | OUTPATIENT
Start: 2018-12-17 | End: 2018-12-17 | Stop reason: HOSPADM

## 2018-12-17 RX ORDER — PROMETHAZINE HYDROCHLORIDE 25 MG/1
25 SUPPOSITORY RECTAL ONCE AS NEEDED
Status: DISCONTINUED | OUTPATIENT
Start: 2018-12-17 | End: 2018-12-17 | Stop reason: HOSPADM

## 2018-12-17 RX ADMIN — AMPICILLIN SODIUM AND SULBACTAM SODIUM 3 G: 2; 1 INJECTION, POWDER, FOR SOLUTION INTRAMUSCULAR; INTRAVENOUS at 08:23

## 2018-12-17 RX ADMIN — HYDROMORPHONE HYDROCHLORIDE 0.5 MG: 2 INJECTION INTRAMUSCULAR; INTRAVENOUS; SUBCUTANEOUS at 08:55

## 2018-12-17 RX ADMIN — FENTANYL CITRATE 50 MCG: 50 INJECTION, SOLUTION INTRAMUSCULAR; INTRAVENOUS at 09:54

## 2018-12-17 RX ADMIN — SUGAMMADEX 300 MG: 100 INJECTION, SOLUTION INTRAVENOUS at 09:17

## 2018-12-17 RX ADMIN — PROPOFOL 200 MG: 10 INJECTION, EMULSION INTRAVENOUS at 08:19

## 2018-12-17 RX ADMIN — LABETALOL HYDROCHLORIDE 5 MG: 5 INJECTION, SOLUTION INTRAVENOUS at 09:37

## 2018-12-17 RX ADMIN — HYDROMORPHONE HYDROCHLORIDE 0.5 MG: 2 INJECTION INTRAMUSCULAR; INTRAVENOUS; SUBCUTANEOUS at 09:03

## 2018-12-17 RX ADMIN — PROPOFOL 50 MG: 10 INJECTION, EMULSION INTRAVENOUS at 08:21

## 2018-12-17 RX ADMIN — MIDAZOLAM HYDROCHLORIDE 2 MG: 2 INJECTION, SOLUTION INTRAMUSCULAR; INTRAVENOUS at 07:13

## 2018-12-17 RX ADMIN — HYDROMORPHONE HYDROCHLORIDE 0.5 MG: 1 INJECTION, SOLUTION INTRAMUSCULAR; INTRAVENOUS; SUBCUTANEOUS at 10:13

## 2018-12-17 RX ADMIN — FENTANYL CITRATE 50 MCG: 50 INJECTION, SOLUTION INTRAMUSCULAR; INTRAVENOUS at 09:40

## 2018-12-17 RX ADMIN — FENTANYL CITRATE 100 MCG: 50 INJECTION INTRAMUSCULAR; INTRAVENOUS at 08:42

## 2018-12-17 RX ADMIN — ONDANSETRON 4 MG: 2 INJECTION INTRAMUSCULAR; INTRAVENOUS at 11:59

## 2018-12-17 RX ADMIN — DEXAMETHASONE SODIUM PHOSPHATE 8 MG: 10 INJECTION INTRAMUSCULAR; INTRAVENOUS at 08:30

## 2018-12-17 RX ADMIN — FAMOTIDINE 20 MG: 10 INJECTION, SOLUTION INTRAVENOUS at 07:13

## 2018-12-17 RX ADMIN — GLYCOPYRROLATE 0.2 MG: 0.2 INJECTION INTRAMUSCULAR; INTRAVENOUS at 08:26

## 2018-12-17 RX ADMIN — LIDOCAINE HYDROCHLORIDE 50 MG: 20 INJECTION, SOLUTION INFILTRATION; PERINEURAL at 08:19

## 2018-12-17 RX ADMIN — ROCURONIUM BROMIDE 50 MG: 10 INJECTION INTRAVENOUS at 08:19

## 2018-12-17 RX ADMIN — ONDANSETRON 4 MG: 2 INJECTION INTRAMUSCULAR; INTRAVENOUS at 08:17

## 2018-12-17 RX ADMIN — HYDROCODONE BITARTRATE AND ACETAMINOPHEN 1 TABLET: 7.5; 325 TABLET ORAL at 09:59

## 2018-12-17 RX ADMIN — HYDROMORPHONE HYDROCHLORIDE 0.5 MG: 1 INJECTION, SOLUTION INTRAMUSCULAR; INTRAVENOUS; SUBCUTANEOUS at 09:42

## 2018-12-17 RX ADMIN — SODIUM CHLORIDE, POTASSIUM CHLORIDE, SODIUM LACTATE AND CALCIUM CHLORIDE 9 ML/HR: 600; 310; 30; 20 INJECTION, SOLUTION INTRAVENOUS at 07:03

## 2018-12-17 NOTE — ANESTHESIA POSTPROCEDURE EVALUATION
"Patient: Hoang So    Procedure Summary     Date:  12/17/18 Room / Location:  [facility] OR 02 / [facility] MAIN OR    Anesthesia Start:  0815 Anesthesia Stop:  0928    Procedure:  RECTAL EXAM UNDER ANESTHESIA , RECTAL FISTULOTOMY, HEMORRHOIDECTOMY (N/A Rectum) Diagnosis:       Perianal fistula      (Perianal fistula [K60.3])    Surgeon:  Román Mendoza MD Provider:  Sabra Deng MD    Anesthesia Type:  general ASA Status:  3          Anesthesia Type: general  Last vitals  BP   133/93 (12/17/18 1040)   Temp   36.6 °C (97.8 °F) (12/17/18 1030)   Pulse   82 (12/17/18 1040)   Resp   16 (12/17/18 1040)     SpO2   93 % (12/17/18 1040)     Post Anesthesia Care and Evaluation      Comments: Patient discharged before being evaluated by an Anesthesiologist. No apparent complications per the record.  This case was not medically directed. I am completing this chart for medical records purposes; I personally have no medical involvement with this patient.    /93   Pulse 82   Temp 36.6 °C (97.8 °F) (Oral)   Resp 16   Ht 182.9 cm (72\")   Wt 134 kg (294 lb 6 oz)   SpO2 93%   BMI 39.92 kg/m²           "

## 2018-12-17 NOTE — OP NOTE
PRIMARY SURGEON:  Román Mendoza MD    ASSISTANT:  Romero Stewart MD    PREOPERATIVE DIAGNOSIS:  Posterior perianal fistula    POSTOPERATIVE DIAGNOSIS:  Same + hemorrhoids.    PROCEDURES PERFORMED:  1.  Exam under anesthesia with posterior midline perianal fistulotomy.  2.  Hemorrhoidectomy.    ANESTHESIA:  General endotracheal anesthesia.    ESTIMATED BLOOD LOSS:  Minimal.    COMPLICATIONS:  None.    FINDINGS:  Posterior perianal fistula and internal hemorrhoids.    CONDITION:  Stable to recovery.    INDICATIONS:  The patient is a very pleasant 63-year-old male with history of perianal abscess.  He was found to have posterior perianal fistula.  He is here today for exam under anesthesia, possible fistulotomy versus Seton exchange.  Risks and benefits of the procedure including bleeding, infection, fecal incontinence were discussed in detail with the patient, who verbalized understanding and agreed with the plan.    PROCEDURE IN DETAIL:  The patient was taken to the operating room and he was placed on the OR table in the supine position.  Preoperative antibiotics were given and SCDs were placed.  The patient underwent general endotracheal anesthesia and then he was switched into the prone position.  The perianal area was then prepped and draped in the usual sterile fashion.  Digital rectal examination was performed that showed no evidence of bulging or perianal abscess.  There were internal hemorrhoids, as well as posterior midline fistula.  The fistula internal opening was below the dentate line and the external opening was at the level of the posterior midline and another.  The tract was probed and he was found to be involving part of the external anal sphincter. I then proceeded to remove the Seton that was in place.  With hemostat, the fistula was delineated.  I called Dr. Stewart for intraoperative consultation to assist and in the decision regarding fistulotomy so posterior perianal fistulotomy was performed with  Bovie electrocautery.  Then, there was internal hemorrhoidal tissue lateral to the left of the opening that was transected with Bovie electrocautery and was sent for pathological analysis.  Irrigation was performed.  I then marsupialized the edges of the fistula with running 3-0 Vicryl suture.  Irrigation again was performed and hemostasis was found to be achieved without any problem.  Proctofoam with lidocaine gel was placed in the anal area.  A perianal block with 0.5% marcaine with epinephrine was performed.  Irrigation was performed.  Dressings were placed.  Mesh bandage was placed.  The patient tolerated the procedure well and he was taken to the recovery room in stable condition.

## 2018-12-17 NOTE — ANESTHESIA PREPROCEDURE EVALUATION
Anesthesia Evaluation     no history of anesthetic complications:               Airway   Mallampati: II  TM distance: >3 FB  Neck ROM: full  Dental - normal exam     Pulmonary    (+) shortness of breath, sleep apnea,   (-) recent URI  Cardiovascular     (+) hypertension, dysrhythmias Atrial Fib, hyperlipidemia,   (-) angina, CHF      Neuro/Psych  (+) dizziness/light headedness,     (-) TIA  GI/Hepatic/Renal/Endo    (+) morbid obesity,    (-) hepatitis, diabetes    Musculoskeletal     (+) neck pain,   Abdominal    Substance History      OB/GYN          Other                        Anesthesia Plan    ASA 3     general     intravenous induction   Anesthetic plan, all risks, benefits, and alternatives have been provided, discussed and informed consent has been obtained with: patient.

## 2018-12-17 NOTE — ANESTHESIA PROCEDURE NOTES
ANESTHESIA INTUBATION  Urgency: elective    Airway not difficult    General Information and Staff    Patient location during procedure: OR  Anesthesiologist: Sabra Deng MD  CRNA: Azra Christiansen CRNA    Indications and Patient Condition  Indications for airway management: airway protection    Preoxygenated: yes  Mask difficulty assessment: 1 - vent by mask    Final Airway Details  Final airway type: endotracheal airway      Successful airway: ETT  Cuffed: yes   Successful intubation technique: direct laryngoscopy  Facilitating devices/methods: intubating stylet  Endotracheal tube insertion site: oral  Blade: Laureano  Blade size: 2  ETT size (mm): 8.0  Cormack-Lehane Classification: grade I - full view of glottis  Placement verified by: chest auscultation and capnometry   Cuff volume (mL): 7  Measured from: teeth  ETT to teeth (cm): 21  Number of attempts at approach: 1

## 2018-12-17 NOTE — PERIOPERATIVE NURSING NOTE
Pt unable to void, states he feels full, bladder scan shows 835cc, pt wishes to wait a few minutes and try again.

## 2018-12-17 NOTE — BRIEF OP NOTE
RECTAL FISTULOTOMY  Progress Note    Hoang So  12/17/2018    Pre-op Diagnosis:   Perianal fistula [K60.3]       Post-Op Diagnosis Codes:     * Perianal fistula [K60.3]    Procedure/CPT® Codes:      Procedure(s):  RECTAL EXAM UNDER ANESTHESIA , RECTAL FISTULOTOMY, HEMORRHOIDECTOMY    Surgeon(s):  Román Mendoza MD Allen, Nechol L, MD    Anesthesia: General    Staff:   Circulator: Leslie Blanc RN  Scrub Person: Refugio Junior Melissa G    Estimated Blood Loss: 10 mL    Urine Voided: * No values recorded between 12/17/2018  8:13 AM and 12/17/2018  9:13 AM *    Specimens:                ID Type Source Tests Collected by Time   A :  Tissue Hemorrhoid(s) TISSUE PATHOLOGY EXAM Román Mendoza MD 12/17/2018 0859         Drains:  None    Findings: Posterior anal fistula    Complications: None      Román Mendoza MD     Date: 12/17/2018  Time: 9:27 AM

## 2018-12-18 LAB
CYTO UR: NORMAL
LAB AP CASE REPORT: NORMAL
PATH REPORT.FINAL DX SPEC: NORMAL

## 2019-01-02 ENCOUNTER — OFFICE VISIT (OUTPATIENT)
Dept: SURGERY | Facility: CLINIC | Age: 64
End: 2019-01-02

## 2019-01-02 DIAGNOSIS — Z09 POSTOP CHECK: Primary | ICD-10-CM

## 2019-01-02 DIAGNOSIS — Z51.89 VISIT FOR WOUND CHECK: ICD-10-CM

## 2019-01-02 PROCEDURE — 99024 POSTOP FOLLOW-UP VISIT: CPT | Performed by: SURGERY

## 2019-01-02 RX ORDER — AMOXICILLIN 250 MG
2 CAPSULE ORAL DAILY PRN
Qty: 60 TABLET | Refills: 1 | Status: SHIPPED | OUTPATIENT
Start: 2019-01-02 | End: 2019-08-28

## 2019-01-16 ENCOUNTER — OFFICE VISIT (OUTPATIENT)
Dept: SURGERY | Facility: CLINIC | Age: 64
End: 2019-01-16

## 2019-01-16 DIAGNOSIS — Z51.89 VISIT FOR WOUND CHECK: Primary | ICD-10-CM

## 2019-01-16 PROCEDURE — 99024 POSTOP FOLLOW-UP VISIT: CPT | Performed by: SURGERY

## 2019-01-16 NOTE — PROGRESS NOTES
Cc: Follow-up after perianal fistulotomy and hemorrhoidectomy     S: Patient is here today for follow-up after undergoing perianal fistulotomy and hemorrhoidectomy.  He is doing fine and his only complaint is pressure-like sensation over the anal area is sitting for longer periods.  This discomfort improves by taking the pressure of the anal area.  He denies any drainage.  He has been having regular bowel movements.     O: Alert and oriented ×3, no acute distress  Over the perianal area there is a small open wound over the area of prior surgery.  There is no drainage.  Digital rectal examination show no evidence of abscess the gluteal wound is completely healed    Assessment and plan     63-year-old male status post posterior perianal fistulotomy and hemorrhoidectomy.  He is doing great and denies any major complaint other than perianal discomfort with prolonged sitting.  Discussed with him about the need to take the pressure off the area that is healing.  He continues and padded donut.     - Continue sitz bath as needed   - Continue bowel regimen with MiraLAX, Metamucil and Christin-Colace.  He can start weaning off MiraLAX if having regular bowel movements    Follow-up in my office when necessary    Román Mendoza MD, FACS  General, Minimally Invasive and Endoscopic Surgery  [facility]    [address], [address]  [place], [place], [address]  P: [phone]  F: [phone]

## 2019-01-28 RX ORDER — PROPAFENONE HYDROCHLORIDE 225 MG/1
TABLET, FILM COATED ORAL
Qty: 270 TABLET | Refills: 2 | Status: SHIPPED | OUTPATIENT
Start: 2019-01-28 | End: 2019-08-28 | Stop reason: SDUPTHER

## 2019-03-01 ENCOUNTER — OFFICE VISIT (OUTPATIENT)
Dept: CARDIOLOGY | Facility: CLINIC | Age: 64
End: 2019-03-01

## 2019-03-01 VITALS
HEART RATE: 64 BPM | DIASTOLIC BLOOD PRESSURE: 88 MMHG | HEIGHT: 72 IN | BODY MASS INDEX: 40.09 KG/M2 | SYSTOLIC BLOOD PRESSURE: 140 MMHG | WEIGHT: 296 LBS

## 2019-03-01 DIAGNOSIS — G47.33 OSA (OBSTRUCTIVE SLEEP APNEA): ICD-10-CM

## 2019-03-01 DIAGNOSIS — I48.0 PAROXYSMAL ATRIAL FIBRILLATION (HCC): Primary | ICD-10-CM

## 2019-03-01 DIAGNOSIS — I10 BENIGN ESSENTIAL HYPERTENSION: ICD-10-CM

## 2019-03-01 PROCEDURE — 93000 ELECTROCARDIOGRAM COMPLETE: CPT | Performed by: INTERNAL MEDICINE

## 2019-03-01 PROCEDURE — 99213 OFFICE O/P EST LOW 20 MIN: CPT | Performed by: INTERNAL MEDICINE

## 2019-03-01 NOTE — PROGRESS NOTES
Date of Office Visit: 2019  Encounter Provider: Lionel Lee MD  Place of Service: [facility]ARDIOLOG[facility]  Patient Name: Hoang So  : 1955    Subjective:     Encounter Date:2019      Patient ID: Hoang So is a 63 y.o. male who has a cc of PAF and HTN and obesity and SDB on CPAP..     He reports no episodes of palp since adjustment of meds -- for about a year.     He has occ head aches.     The patient had a good year.   No anginal chest pain,   Mild blanco,   No soa,   No fainting,  No orthostasis.   No edema.   Exercise tolerance: he can walk and he does exercise five times per week         There have been no bleeding events.       Past Medical History:   Diagnosis Date   • Atrial fibrillation (CMS/HCC)    • Cervical stenosis of spine    • DDD (degenerative disc disease), cervical    • Dizziness    • Hyperlipidemia    • Hypertension    • Low back pain    • Perianal fistula    • Sleep apnea     CPAP   • SOB (shortness of breath)        Social History     Socioeconomic History   • Marital status:      Spouse name: Not on file   • Number of children: Not on file   • Years of education: Not on file   • Highest education level: Not on file   Social Needs   • Financial resource strain: Not on file   • Food insecurity - worry: Not on file   • Food insecurity - inability: Not on file   • Transportation needs - medical: Not on file   • Transportation needs - non-medical: Not on file   Occupational History   • Not on file   Tobacco Use   • Smoking status: Former Smoker     Packs/day: 0.25     Years: 10.00     Pack years: 2.50     Types: Cigarettes     Last attempt to quit: 2007     Years since quittin.1   • Smokeless tobacco: Former User   • Tobacco comment: daily caffiene   Substance and Sexual Activity   • Alcohol use: Yes     Comment: social   • Drug use: No   • Sexual activity: Defer   Other Topics Concern   • Not on file   Social History Narrative   • Not on  "file       Review of Systems   Constitution: Negative for fever and night sweats.   HENT: Negative for ear pain and stridor.    Eyes: Negative for discharge and visual halos.   Cardiovascular: Negative for cyanosis.   Respiratory: Negative for hemoptysis and sputum production.    Hematologic/Lymphatic: Negative for adenopathy.   Skin: Negative for nail changes and unusual hair distribution.   Musculoskeletal: Negative for gout and joint swelling.   Gastrointestinal: Negative for bowel incontinence and flatus.   Genitourinary: Negative for dysuria and flank pain.   Neurological: Negative for seizures and tremors.   Psychiatric/Behavioral: Negative for altered mental status. The patient is not nervous/anxious.             Objective:     Vitals:    03/01/19 0948   BP: 140/88   BP Location: Right arm   Patient Position: Sitting   Cuff Size: Large Adult   Pulse: 64   Weight: 134 kg (296 lb)   Height: 182.9 cm (72\")         Physical Exam   Constitutional: He is oriented to person, place, and time.   HENT:   Head: Normocephalic and atraumatic.   Eyes: Right eye exhibits no discharge. Left eye exhibits no discharge.   Neck: No JVD present. No thyromegaly present.   Cardiovascular: Normal rate and regular rhythm. Exam reveals no gallop and no friction rub.   No murmur heard.  Pulmonary/Chest: Effort normal and breath sounds normal. He has no rales.   Abdominal: Soft. Bowel sounds are normal. There is no tenderness.   Musculoskeletal: Normal range of motion. He exhibits no edema or deformity.   Neurological: He is alert and oriented to person, place, and time. He exhibits normal muscle tone.   Skin: Skin is warm and dry. No erythema.   Psychiatric: He has a normal mood and affect. His behavior is normal. Thought content normal.         ECG 12 Lead  Date/Time: 3/1/2019 10:04 AM  Performed by: Lionel Lee MD  Authorized by: Lionel Lee MD   Comparison: compared with previous ECG   Similar to previous ECG  Rhythm: sinus " rhythm  Rate: normal  Conduction: conduction normal  ST Segments: ST segments normal  T Waves: T waves normal  QRS axis: normal    Clinical impression: normal ECG            Lab Review:       Assessment:          Diagnosis Plan   1. Paroxysmal atrial fibrillation (CMS/HCC)     2. Benign essential hypertension     3. SULLY (obstructive sleep apnea)            Plan:         Atrial Fibrillation and Atrial Flutter  Assessment  • The patient has paroxysmal atrial fibrillation  • This is non-valvular in etiology  • The patient's CHADS2-VASc score is 2  • A [id number] score of 2 or more is considered a high risk for a thromboembolic event    Plan  • Attempt to maintain sinus rhythm  • Continue rivaroxaban for antithrombotic therapy, bleeding issues discussed  • Continue propafenone for rhythm control  • Continue beta blocker for rate control    His AF is fine --     We spent most of the visit discussing weight loss techniques.

## 2019-08-19 RX ORDER — ROSUVASTATIN CALCIUM 10 MG/1
TABLET, COATED ORAL
Qty: 90 TABLET | Refills: 2 | OUTPATIENT
Start: 2019-08-19

## 2019-08-28 ENCOUNTER — OFFICE VISIT (OUTPATIENT)
Dept: FAMILY MEDICINE CLINIC | Facility: CLINIC | Age: 64
End: 2019-08-28

## 2019-08-28 VITALS
DIASTOLIC BLOOD PRESSURE: 80 MMHG | OXYGEN SATURATION: 98 % | WEIGHT: 294.2 LBS | HEART RATE: 75 BPM | HEIGHT: 72 IN | SYSTOLIC BLOOD PRESSURE: 136 MMHG | BODY MASS INDEX: 39.85 KG/M2 | TEMPERATURE: 98.1 F

## 2019-08-28 DIAGNOSIS — Z13.1 SCREENING FOR DIABETES MELLITUS: ICD-10-CM

## 2019-08-28 DIAGNOSIS — I10 HYPERTENSION, UNSPECIFIED TYPE: ICD-10-CM

## 2019-08-28 DIAGNOSIS — Z12.5 SPECIAL SCREENING FOR MALIGNANT NEOPLASM OF PROSTATE: ICD-10-CM

## 2019-08-28 DIAGNOSIS — Z13.0 SCREENING FOR IRON DEFICIENCY ANEMIA: ICD-10-CM

## 2019-08-28 DIAGNOSIS — M65.342 TRIGGER RING FINGER OF LEFT HAND: ICD-10-CM

## 2019-08-28 DIAGNOSIS — E78.2 MIXED HYPERLIPIDEMIA: Primary | ICD-10-CM

## 2019-08-28 DIAGNOSIS — I48.0 PAROXYSMAL ATRIAL FIBRILLATION (HCC): ICD-10-CM

## 2019-08-28 PROBLEM — M54.12 CERVICAL RADICULOPATHY: Status: RESOLVED | Noted: 2017-05-20 | Resolved: 2019-08-28

## 2019-08-28 PROBLEM — R06.02 SHORTNESS OF BREATH: Status: RESOLVED | Noted: 2017-11-14 | Resolved: 2019-08-28

## 2019-08-28 PROBLEM — R42 DIZZINESS: Status: RESOLVED | Noted: 2017-10-13 | Resolved: 2019-08-28

## 2019-08-28 PROBLEM — R11.0 NAUSEA: Status: RESOLVED | Noted: 2017-10-13 | Resolved: 2019-08-28

## 2019-08-28 PROBLEM — J40 BRONCHITIS: Status: RESOLVED | Noted: 2017-11-09 | Resolved: 2019-08-28

## 2019-08-28 PROBLEM — R06.02 SOB (SHORTNESS OF BREATH): Status: RESOLVED | Noted: 2017-10-13 | Resolved: 2019-08-28

## 2019-08-28 PROBLEM — M50.30 DDD (DEGENERATIVE DISC DISEASE), CERVICAL: Status: RESOLVED | Noted: 2017-07-07 | Resolved: 2019-08-28

## 2019-08-28 PROBLEM — R09.82 POST-NASAL DRIP: Status: RESOLVED | Noted: 2017-11-09 | Resolved: 2019-08-28

## 2019-08-28 PROCEDURE — 99214 OFFICE O/P EST MOD 30 MIN: CPT | Performed by: NURSE PRACTITIONER

## 2019-08-28 RX ORDER — ROSUVASTATIN CALCIUM 10 MG/1
5 TABLET, COATED ORAL NIGHTLY
Qty: 90 TABLET | Refills: 3
Start: 2019-08-28 | End: 2020-10-07 | Stop reason: SDUPTHER

## 2019-08-28 RX ORDER — PROPAFENONE HYDROCHLORIDE 225 MG/1
225 TABLET, FILM COATED ORAL EVERY 8 HOURS
Qty: 270 TABLET | Refills: 3 | Status: SHIPPED | OUTPATIENT
Start: 2019-08-28 | End: 2020-10-07 | Stop reason: SDUPTHER

## 2019-08-28 NOTE — PROGRESS NOTES
Subjective   Hoang So is a 64 y.o. male.     History of Present Illness    Since the last visit, he has overall felt well.  He has Essential Hypertension and well controlled on current medication, Atrial Fibillation and remains under the care of their cardiologist for management and rechecking lipids today and taking med as directed .  he has been compliant with current medications have reviewed them.  The patient denies medication side effects.  Will refill medications.    Still has trigger finger left hand fourth finger and now wanting a referralt o ortho.    Results for orders placed or performed during the hospital encounter of 12/17/18   Basic Metabolic Panel   Result Value Ref Range    Glucose 102 (H) 65 - 99 mg/dL    BUN 17 8 - 23 mg/dL    Creatinine 1.12 0.76 - 1.27 mg/dL    Sodium 141 136 - 145 mmol/L    Potassium 4.3 3.5 - 5.2 mmol/L    Chloride 104 98 - 107 mmol/L    CO2 24.7 22.0 - 29.0 mmol/L    Calcium 9.8 8.6 - 10.5 mg/dL    eGFR Non African Amer 66 >60 mL/min/1.73    BUN/Creatinine Ratio 15.2 7.0 - 25.0    Anion Gap 12.3 mmol/L   CBC Auto Differential   Result Value Ref Range    WBC 8.50 4.50 - 10.70 10*3/mm3    RBC 4.81 4.60 - 6.00 10*6/mm3    Hemoglobin 14.8 13.7 - 17.6 g/dL    Hematocrit 45.9 40.4 - 52.2 %    MCV 95.4 79.8 - 96.2 fL    MCH 30.8 27.0 - 32.7 pg    MCHC 32.2 (L) 32.6 - 36.4 g/dL    RDW 13.2 11.5 - 14.5 %    RDW-SD 46.1 37.0 - 54.0 fl    MPV 10.3 6.0 - 12.0 fL    Platelets 206 140 - 500 10*3/mm3    Neutrophil % 50.0 42.7 - 76.0 %    Lymphocyte % 35.6 19.6 - 45.3 %    Monocyte % 9.8 5.0 - 12.0 %    Eosinophil % 2.8 0.3 - 6.2 %    Basophil % 1.4 0.0 - 1.5 %    Immature Grans % 0.4 0.0 - 0.5 %    Neutrophils, Absolute 4.25 1.90 - 8.10 10*3/mm3    Lymphocytes, Absolute 3.03 0.90 - 4.80 10*3/mm3    Monocytes, Absolute 0.83 0.20 - 1.20 10*3/mm3    Eosinophils, Absolute 0.24 0.00 - 0.70 10*3/mm3    Basophils, Absolute 0.12 0.00 - 0.20 10*3/mm3    Immature Grans, Absolute 0.03 0.00 - 0.03  "10*3/mm3    nRBC 0.0 0.0 - 0.0 /100 WBC   Tissue Pathology Exam   Result Value Ref Range    Case Report       Surgical Pathology Report                         Case: [id number]                                  Authorizing Provider:  Román Mendoza,  Collected:           12/17/2018 08:59 AM                                 MD                                                                           Ordering Location:     [facility]  Received:            12/17/2018 11:15 AM                                 MAIN OR                                                                      Pathologist:           Mariano Butler MD                                                     Specimen:    Hemorrhoid(s)                                                                              Final Diagnosis       1. Hemorrhoid:     A. Hemorrhoidal tissue fragment, 2.5 cm.   B. Severe submucosal vascular congestion.    dma/kds      Microscopic Description       1. Received in formalin labeled with the patient's name and designated \"hemorrhoid\".  The specimen consists of a rubbery purple tan partially epithelialized soft tissue excision measuring 2.5 x 1.5 x 1.4 cm.  Sectioning through the tissue reveals clot filled subepithelial spaces without mass lesion nor tumor nodularity identified.  The entire specimen is submitted en toto in a single cassette.  Total blocks: 1.    HT/USO/JAT/jse            The following portions of the patient's history were reviewed and updated as appropriate: allergies, current medications, past social history and problem list.    Review of Systems   Skin:        cyst   All other systems reviewed and are negative.      Objective   /80 (BP Location: Left arm, Patient Position: Sitting)   Pulse 75   Temp 98.1 °F (36.7 °C)   Ht 182.9 cm (72.01\")   Wt 133 kg (294 lb 3.2 oz)   SpO2 98%   BMI 39.89 kg/m²   Physical Exam   Constitutional: He is oriented to person, place, and " time. Vital signs are normal. He appears well-developed and well-nourished. No distress.   HENT:   Head: Normocephalic.   Cardiovascular: Normal rate, regular rhythm and normal heart sounds.   Pulmonary/Chest: Effort normal and breath sounds normal.   Neurological: He is alert and oriented to person, place, and time. Gait normal.   Psychiatric: He has a normal mood and affect. His behavior is normal. Judgment and thought content normal.   Vitals reviewed.      Assessment/Plan      Diagnosis Plan   1. Mixed hyperlipidemia  rosuvastatin (CRESTOR) 10 MG tablet   2. Hypertension, unspecified type  metoprolol tartrate (LOPRESSOR) 25 MG tablet    Comprehensive Metabolic Panel    Lipid Panel With LDL / HDL Ratio   3. Paroxysmal atrial fibrillation (CMS/HCC)  propafenone (RYTHMOL) 225 MG tablet   4. Screening for iron deficiency anemia  CBC & Differential   5. Screening for diabetes mellitus  Comprehensive Metabolic Panel   6. Special screening for malignant neoplasm of prostate  PSA Screen   7. Trigger ring finger of left hand  Ambulatory Referral to Orthopedic Surgery     Follow up after labs   Continue the same with ASAEL Plascencia  8/28/2019

## 2019-08-29 LAB
ALBUMIN SERPL-MCNC: 4.4 G/DL (ref 3.5–5.2)
ALBUMIN/GLOB SERPL: 1.6 G/DL
ALP SERPL-CCNC: 92 U/L (ref 39–117)
ALT SERPL-CCNC: 19 U/L (ref 1–41)
AST SERPL-CCNC: 16 U/L (ref 1–40)
BASOPHILS # BLD AUTO: 0.11 10*3/MM3 (ref 0–0.2)
BASOPHILS NFR BLD AUTO: 1.6 % (ref 0–1.5)
BILIRUB SERPL-MCNC: 0.5 MG/DL (ref 0.2–1.2)
BUN SERPL-MCNC: 19 MG/DL (ref 8–23)
BUN/CREAT SERPL: 16 (ref 7–25)
CALCIUM SERPL-MCNC: 9.8 MG/DL (ref 8.6–10.5)
CHLORIDE SERPL-SCNC: 103 MMOL/L (ref 98–107)
CHOLEST SERPL-MCNC: 168 MG/DL (ref 0–200)
CO2 SERPL-SCNC: 23.4 MMOL/L (ref 22–29)
CREAT SERPL-MCNC: 1.19 MG/DL (ref 0.76–1.27)
EOSINOPHIL # BLD AUTO: 0.17 10*3/MM3 (ref 0–0.4)
EOSINOPHIL NFR BLD AUTO: 2.4 % (ref 0.3–6.2)
ERYTHROCYTE [DISTWIDTH] IN BLOOD BY AUTOMATED COUNT: 13.6 % (ref 12.3–15.4)
GLOBULIN SER CALC-MCNC: 2.7 GM/DL
GLUCOSE SERPL-MCNC: 91 MG/DL (ref 65–99)
HCT VFR BLD AUTO: 48 % (ref 37.5–51)
HDLC SERPL-MCNC: 37 MG/DL (ref 40–60)
HGB BLD-MCNC: 15.7 G/DL (ref 13–17.7)
IMM GRANULOCYTES # BLD AUTO: 0.03 10*3/MM3 (ref 0–0.05)
IMM GRANULOCYTES NFR BLD AUTO: 0.4 % (ref 0–0.5)
LDLC SERPL CALC-MCNC: 89 MG/DL (ref 0–100)
LDLC/HDLC SERPL: 2.42 {RATIO}
LYMPHOCYTES # BLD AUTO: 2.7 10*3/MM3 (ref 0.7–3.1)
LYMPHOCYTES NFR BLD AUTO: 38.9 % (ref 19.6–45.3)
MCH RBC QN AUTO: 30.5 PG (ref 26.6–33)
MCHC RBC AUTO-ENTMCNC: 32.7 G/DL (ref 31.5–35.7)
MCV RBC AUTO: 93.2 FL (ref 79–97)
MONOCYTES # BLD AUTO: 0.77 10*3/MM3 (ref 0.1–0.9)
MONOCYTES NFR BLD AUTO: 11.1 % (ref 5–12)
NEUTROPHILS # BLD AUTO: 3.16 10*3/MM3 (ref 1.7–7)
NEUTROPHILS NFR BLD AUTO: 45.6 % (ref 42.7–76)
NRBC BLD AUTO-RTO: 0 /100 WBC (ref 0–0.2)
PLATELET # BLD AUTO: 220 10*3/MM3 (ref 140–450)
POTASSIUM SERPL-SCNC: 4.7 MMOL/L (ref 3.5–5.2)
PROT SERPL-MCNC: 7.1 G/DL (ref 6–8.5)
PSA SERPL-MCNC: 0.73 NG/ML (ref 0–4)
RBC # BLD AUTO: 5.15 10*6/MM3 (ref 4.14–5.8)
SODIUM SERPL-SCNC: 142 MMOL/L (ref 136–145)
TRIGL SERPL-MCNC: 208 MG/DL (ref 0–150)
VLDLC SERPL CALC-MCNC: 41.6 MG/DL
WBC # BLD AUTO: 6.94 10*3/MM3 (ref 3.4–10.8)

## 2019-08-30 RX ORDER — RIVAROXABAN 20 MG/1
TABLET, FILM COATED ORAL
Qty: 90 TABLET | Refills: 1 | Status: SHIPPED | OUTPATIENT
Start: 2019-08-30 | End: 2020-02-25

## 2019-09-11 ENCOUNTER — OFFICE VISIT (OUTPATIENT)
Dept: CARDIOLOGY | Facility: CLINIC | Age: 64
End: 2019-09-11

## 2019-09-11 VITALS
WEIGHT: 296.4 LBS | DIASTOLIC BLOOD PRESSURE: 78 MMHG | HEART RATE: 73 BPM | SYSTOLIC BLOOD PRESSURE: 122 MMHG | BODY MASS INDEX: 40.14 KG/M2 | HEIGHT: 72 IN

## 2019-09-11 DIAGNOSIS — G47.33 OSA (OBSTRUCTIVE SLEEP APNEA): ICD-10-CM

## 2019-09-11 DIAGNOSIS — E66.01 MORBID OBESITY (HCC): ICD-10-CM

## 2019-09-11 DIAGNOSIS — E78.2 MIXED HYPERLIPIDEMIA: ICD-10-CM

## 2019-09-11 DIAGNOSIS — I10 BENIGN ESSENTIAL HYPERTENSION: ICD-10-CM

## 2019-09-11 DIAGNOSIS — I48.0 PAF (PAROXYSMAL ATRIAL FIBRILLATION) (HCC): Primary | ICD-10-CM

## 2019-09-11 PROCEDURE — 93000 ELECTROCARDIOGRAM COMPLETE: CPT | Performed by: NURSE PRACTITIONER

## 2019-09-11 PROCEDURE — 99214 OFFICE O/P EST MOD 30 MIN: CPT | Performed by: NURSE PRACTITIONER

## 2019-09-11 NOTE — PROGRESS NOTES
Date of Office Visit: 2019  Encounter Provider: Elizabeth Mueller, FABIOLA, APRN  Place of Service: [facility]  Patient Name: Hoang So  :1955        Subjective:     Chief Complaint:  Follow-up, paroxysmal atrial fibrillation, hypertension.      History of Present Illness:  Hoang So is a 64 y.o. male patient of Dr. Aguillon & Dr. Lee.  This is my first time seeing this patient in the office today and I have reviewed his records.    Patient has a history of paroxysmal atrial fibrillation, hypertension, hyperlipidemia, foot pain, obesity.    [facility] with complaints of dizziness and shortness of breath.  He was found to be having paroxysms of atrial fibrillation.  He was given IV Lasix and IV diltiazem as well as IV metoprolol.  He had an echo which showed asymmetric left ventricular hypertrophy, normal LV systolic function, no significant valvular disease.  He was sent home on oral diltiazem.  He had recurrent symptomatic atrial fibrillation and was started on propafenone and has remained in sinus rhythm since that time.  His atrial fibrillation is followed by Dr. Lee.      Patient presents to office today for follow-up appointment.  Patient reports he has been feeling fine since last visit.  He is working out for 40 minutes on the treadmill walking 5 days a week and also doing some resistance training.  He has been doing the same routine for the last couple of years.  He denies any exertional issues or concerns.  He also plays golf on occasion.  He is frustrated that he has not been successful in losing weight.  He does admit that his diet is not the best.  Discussed trying to make a diet improvements and keep a food journal so he can better keep track of what he is eating each day for a week.  Also recommended recording liquid calories.  He has not had thyroid checked within the last couple of years so I did recommend that he also  follow-up with his primary care provider to inquire about this.  He declines a referral to a nutritionist at this time but reports he could bring his diet log to see his primary care provider when he goes to discuss getting thyroid levels checked and to discuss weight loss.  Also recommended that he try to mix up his exercise routine a little bit as he reports he has been doing the same thing for 2 years without success.  He does have a history of obstructive sleep apnea and reports he uses his CPAP nightly.  He has not seen sleep medicine in a couple of years.  He was not happy with the provider he saw in the past.  I will place referral back to sleep medicine to ensure that CPAP settings are correct and that this is not contributing to have explained weight gain/lack of weight loss.  Blood pressure and heart rate are well controlled in the office today.  He denies any chest pain, shortness of breath, shortness of breath with exertion, palpitations, racing heartbeat sensation, lower extremity edema, dizziness, syncope, near syncope, falls, fatigue, or abnormal bleeding.        Past Medical History:   Diagnosis Date   • Atrial fibrillation (CMS/HCC)    • Cervical stenosis of spine    • DDD (degenerative disc disease), cervical    • Dizziness    • Hyperlipidemia    • Hypertension    • Low back pain    • Perianal fistula    • Sleep apnea     CPAP   • SOB (shortness of breath)      Past Surgical History:   Procedure Laterality Date   • CERVICAL DISC SURGERY     • COLONOSCOPY     • EPIDURAL BLOCK N/A    • INCISION AND DRAINAGE PERIRECTAL ABSCESS N/A 11/9/2018    Procedure: INCISION AND DRAINAGE OF PERIRECTAL ABSCESS AND SETON PLACEMENT AND DEBRIDEMENT OF RIGHT GLUTEAL ULCER;  Surgeon: Roámn Mendoza MD;  Location: [facility];  Service: General   • RECTAL FISTULOTOMY N/A 12/17/2018    Procedure: RECTAL EXAM UNDER ANESTHESIA , RECTAL FISTULOTOMY, HEMORRHOIDECTOMY;  Surgeon: Román Mendoza MD;   Location: [facility] MAIN OR;  Service: General   • SHOULDER ARTHROSCOPY W/ ROTATOR CUFF REPAIR Left 10/21/2015    Revision arthroscopic left rotator cuff repair with anchors X1 and suture X1. Revision acromioplasty-Dr. Flaco Lennon     Outpatient Medications Prior to Visit   Medication Sig Dispense Refill   • acetaminophen (TYLENOL) 500 MG tablet Take 1,000 mg by mouth Every 6 (Six) Hours As Needed for Mild Pain .     • metoprolol tartrate (LOPRESSOR) 25 MG tablet Take 0.5 tablets by mouth Every 12 (Twelve) Hours. 90 tablet 3   • propafenone (RYTHMOL) 225 MG tablet Take 1 tablet by mouth Every 8 (Eight) Hours. 270 tablet 3   • rosuvastatin (CRESTOR) 10 MG tablet Take 0.5 tablets by mouth Every Night. 90 tablet 3   • XARELTO 20 MG tablet TAKE ONE TABLET BY MOUTH DAILY 90 tablet 1   • lidocaine (XYLOCAINE) 2 % jelly Apply to perianal area daily prn 30 mL 1   • polyethylene glycol (MIRALAX) packet Take 17 g by mouth Daily. 60 each 1   • rivaroxaban (XARELTO) 20 MG tablet Take 20 mg by mouth Daily.       No facility-administered medications prior to visit.        Allergies as of 2019   • (No Known Allergies)     Social History     Socioeconomic History   • Marital status:      Spouse name: Not on file   • Number of children: Not on file   • Years of education: Not on file   • Highest education level: Not on file   Tobacco Use   • Smoking status: Former Smoker     Packs/day: 0.25     Years: 10.00     Pack years: 2.50     Types: Cigarettes     Last attempt to quit:      Years since quittin.7   • Smokeless tobacco: Former User   • Tobacco comment: daily caffiene   Substance and Sexual Activity   • Alcohol use: Yes     Comment: social   • Drug use: No   • Sexual activity: Defer     Family History   Problem Relation Age of Onset   • Heart disease Father    • Malig Hyperthermia Neg Hx        Review of Systems   Constitution: Negative for malaise/fatigue.   Cardiovascular: Negative for chest pain, dyspnea  "on exertion, leg swelling, near-syncope, palpitations and syncope.   Respiratory: Negative for shortness of breath.    Hematologic/Lymphatic: Negative for bleeding problem.   Musculoskeletal: Negative for falls.   Gastrointestinal: Negative for jaundice and melena.   Genitourinary: Negative for hematuria.   Neurological: Negative for dizziness.   Psychiatric/Behavioral: Negative for altered mental status.          Objective:     Vitals:    09/11/19 1307   BP: 122/78   BP Location: Right arm   Cuff Size: Large Adult   Pulse: 73   Weight: 134 kg (296 lb 6.4 oz)   Height: 182.9 cm (72\")     Body mass index is 40.2 kg/m².      PHYSICAL EXAM:  Physical Exam   Constitutional: He is oriented to person, place, and time. He appears well-developed and well-nourished. No distress.   Obese   HENT:   Head: Normocephalic and atraumatic.   Eyes: Pupils are equal, round, and reactive to light.   Neck: Neck supple. No JVD present. Carotid bruit is not present.   Cardiovascular: Normal rate, regular rhythm, normal heart sounds and intact distal pulses. Exam reveals no gallop and no friction rub.   No murmur heard.  Pulses:       Radial pulses are 2+ on the right side, and 2+ on the left side.        Posterior tibial pulses are 2+ on the right side, and 2+ on the left side.   Pulmonary/Chest: Effort normal and breath sounds normal. No respiratory distress. He has no wheezes. He has no rales.   Abdominal: Soft. Bowel sounds are normal. He exhibits no distension. There is no tenderness.   Musculoskeletal: He exhibits no edema, tenderness or deformity.   Neurological: He is alert and oriented to person, place, and time.   Skin: Skin is warm and dry. He is not diaphoretic.   Psychiatric: He has a normal mood and affect. His behavior is normal. Judgment normal.           ECG 12 Lead  Date/Time: 9/11/2019 1:23 PM  Performed by: Elizabeth Mueller DNP, APRN  Authorized by: Elizabeth Mueller DNP, APRN   Comparison: compared with previous ECG " from 3/1/2019  Similar to previous ECG  Rhythm: sinus rhythm  Rate: normal  BPM: 73  Comments: No significant changes from previous ECG.              Assessment:       Diagnosis Plan   1. PAF (paroxysmal atrial fibrillation) (CMS/[facility])     2. Benign essential hypertension     3. Mixed hyperlipidemia     4. SULLY (obstructive sleep apnea)  Ambulatory Referral to Sleep Medicine   5. Morbid obesity (CMS/[facility])           Plan:     1. Paroxysmal atrial fibrillation: Remains in sinus rhythm on ECG today.  Remains on metoprolol for beta-blocker therapy and Rythmol for antiarrhythmic therapy as well as Xarelto for anticoagulation therapy.    Atrial Fibrillation and Atrial Flutter  Assessment  • The patient has paroxysmal atrial fibrillation  • This is non-valvular in etiology  • The patient's CHADS2-VASc score is 2  • A [id number] score of 2 or more is considered a high risk for a thromboembolic event    Plan  • Attempt to maintain sinus rhythm  • Continue rivaroxaban for antithrombotic therapy, bleeding issues discussed  • Continue propafenone for rhythm control  • Continue beta blocker for rate control    2. Hypertension: Blood pressure well controlled in the office today.  Patient to continue to monitor.  Blood pressure goal 130/80 or less.  3. Hyperlipidemia: Managed by outside provider.  On Crestor.  4. Obstructive sleep apnea: On CPAP therapy.  Needs follow-up with sleep medicine.  Will place referral.  5. Obesity: Patient has a hard time losing weight despite exercise.  Recommended inquiring about getting thyroid levels checked through primary care.  Also recommended changing of his exercise routine as he has been doing the same routine for the last couple of years.  He does report that his diet has room for improvement.  He declines referral to nutritionist at this time.  I advised that he keep a food log for a week to help see better where his diet could use some improvement.  He will bring this to his follow-up with  his primary care provider.  Recommend also getting CPAP settings checked.  6. History of foot pain    Patient to keep March follow-up with ASAEL Canas as scheduled and schedule one-year follow-up with Dr. Aguillon or follow-up sooner if needed for any new, recurrent, or worsening symptoms or other problems/concerns.             Your medication list           Accurate as of 9/11/19  1:51 PM. If you have any questions, ask your nurse or doctor.               CONTINUE taking these medications      Instructions Last Dose Given Next Dose Due   acetaminophen 500 MG tablet  Commonly known as:  TYLENOL      Take 1,000 mg by mouth Every 6 (Six) Hours As Needed for Mild Pain .       metoprolol tartrate 25 MG tablet  Commonly known as:  LOPRESSOR      Take 0.5 tablets by mouth Every 12 (Twelve) Hours.       propafenone 225 MG tablet  Commonly known as:  RYTHMOL      Take 1 tablet by mouth Every 8 (Eight) Hours.       rosuvastatin 10 MG tablet  Commonly known as:  CRESTOR      Take 0.5 tablets by mouth Every Night.       XARELTO 20 MG tablet  Generic drug:  rivaroxaban      TAKE ONE TABLET BY MOUTH DAILY          STOP taking these medications    lidocaine 2 % jelly  Commonly known as:  XYLOCAINE  Stopped by:  Elizabeth Mueller DNP, APRN        polyethylene glycol packet  Commonly known as:  MIRALAX  Stopped by:  Elizabeth Mueller DNP, APRN               I did not stop or change the above medications.  Patient's medication list was updated to reflect medications they are currently taking including medication changes made by other providers.        Thanks,    Elizabeth Mueller DNP, APRN  09/11/2019         Dictated utilizing Dragon dictation

## 2019-09-16 ENCOUNTER — OFFICE VISIT (OUTPATIENT)
Dept: ORTHOPEDIC SURGERY | Facility: CLINIC | Age: 64
End: 2019-09-16

## 2019-09-16 VITALS — BODY MASS INDEX: 39.96 KG/M2 | HEIGHT: 72 IN | TEMPERATURE: 98.4 F | WEIGHT: 295 LBS

## 2019-09-16 DIAGNOSIS — M79.642 LEFT HAND PAIN: Primary | ICD-10-CM

## 2019-09-16 PROCEDURE — 73130 X-RAY EXAM OF HAND: CPT | Performed by: ORTHOPAEDIC SURGERY

## 2019-09-16 PROCEDURE — 99202 OFFICE O/P NEW SF 15 MIN: CPT | Performed by: ORTHOPAEDIC SURGERY

## 2019-09-16 NOTE — PROGRESS NOTES
Patient: Hoang So    YOB: 1955    Medical Record Number: [id number]    Chief Complaints: Left hand swelling    History of Present Illness:     64 y.o. male patient who presents for his left hand.  He noticed some swelling in the palm of his hand about a year ago.  It has not progressed.  It bothers him intermittently when gripping.  Pain is mild and aching.  For the most part, it does not really bother him.  He has had several people tell him different things about the etiology.  He thought it may be a trigger finger.  He denies any catching or popping of the finger.    Allergies: No Known Allergies    Home Medications:      Current Outpatient Medications:   •  acetaminophen (TYLENOL) 500 MG tablet, Take 1,000 mg by mouth Every 6 (Six) Hours As Needed for Mild Pain ., Disp: , Rfl:   •  metoprolol tartrate (LOPRESSOR) 25 MG tablet, Take 0.5 tablets by mouth Every 12 (Twelve) Hours., Disp: 90 tablet, Rfl: 3  •  propafenone (RYTHMOL) 225 MG tablet, Take 1 tablet by mouth Every 8 (Eight) Hours., Disp: 270 tablet, Rfl: 3  •  rosuvastatin (CRESTOR) 10 MG tablet, Take 0.5 tablets by mouth Every Night., Disp: 90 tablet, Rfl: 3  •  XARELTO 20 MG tablet, TAKE ONE TABLET BY MOUTH DAILY, Disp: 90 tablet, Rfl: 1    Past Medical History:   Diagnosis Date   • Atrial fibrillation (CMS/HCC)    • Cervical stenosis of spine    • DDD (degenerative disc disease), cervical    • Dizziness    • Hyperlipidemia    • Hypertension    • Low back pain    • Perianal fistula    • Sleep apnea     CPAP   • SOB (shortness of breath)        Past Surgical History:   Procedure Laterality Date   • CERVICAL DISC SURGERY     • COLONOSCOPY     • EPIDURAL BLOCK N/A    • INCISION AND DRAINAGE PERIRECTAL ABSCESS N/A 11/9/2018    Procedure: INCISION AND DRAINAGE OF PERIRECTAL ABSCESS AND SETON PLACEMENT AND DEBRIDEMENT OF RIGHT GLUTEAL ULCER;  Surgeon: Román Mendoza MD;  Location: [facility];  Service: General   • RECTAL  FISTULOTOMY N/A 2018    Procedure: RECTAL EXAM UNDER ANESTHESIA , RECTAL FISTULOTOMY, HEMORRHOIDECTOMY;  Surgeon: Román Mendoza MD;  Location: [facility];  Service: General   • SHOULDER ARTHROSCOPY W/ ROTATOR CUFF REPAIR Left 10/21/2015    Revision arthroscopic left rotator cuff repair with anchors X1 and suture X1. Revision acromioplasty-Dr. Flaco Lennon       Social History     Occupational History   • Not on file   Tobacco Use   • Smoking status: Former Smoker     Packs/day: 0.25     Years: 10.00     Pack years: 2.50     Types: Cigarettes     Last attempt to quit:      Years since quittin.7   • Smokeless tobacco: Former User   • Tobacco comment: daily caffiene   Substance and Sexual Activity   • Alcohol use: Yes     Comment: social   • Drug use: No   • Sexual activity: Defer      Social History     Social History Narrative   • Not on file       Family History   Problem Relation Age of Onset   • Heart disease Father    • Malig Hyperthermia Neg Hx        Review of Systems:      Constitutional: Denies fever, shaking or chills   Eyes: Denies change in visual acuity   HEENT: Denies nasal congestion or sore throat   Respiratory: Denies cough or shortness of breath   Cardiovascular: Denies chest pain or edema  Endocrine: Denies tremors, palpitations, intolerance of heat or cold, polyuria, polydipsia.  GI: Denies abdominal pain, nausea, vomiting, bloody stools or diarrhea  : Denies frequency, urgency, incontinence, retention, or nocturia.  Musculoskeletal: Denies numbness, tingling or loss of motor function except as above  Integument: Denies rash, lesion or ulceration   Neurologic: Denies headache or focal weakness, deficits  Heme: Denies spontaneous or excessive bleeding, epistaxis, hematuria, melena, fatigue, enlarged or tender lymph nodes.      All other pertinent positives and negatives as noted above in HPI.    Physical Exam: 64 y.o. male    Vitals:    19 0944   Temp: 98.4 °F  "(36.9 °C)   TempSrc: Temporal   Weight: 134 kg (295 lb)   Height: 182.9 cm (72\")       General:  Patient is awake and alert.  Appears in no acute distress or discomfort.    Psych:  Affect and demeanor are appropriate.    Eyes:  Conjunctiva and sclera appear grossly normal.  Eyes track well and EOM seem to be intact.    Ears:  No gross abnormalities.  Hearing adequate for the exam.    Cardiovascular:  Regular rate and rhythm.    Lungs:  Good chest expansion.  Breathing unlabored.    Lymph:  No palpable adenopathy in the left upper extremity    Extremities: Left hand is examined.  He has a palpable cord in the palm of his left hand just below the MCP joint of the ring finger.  He has good finger and hand mobility.  No instability.  Good  and pinch strength.  Good finger and thumb abduction strength.  Intact sensation.  Brisk capillary refill.  Good skin turgor.         Radiology:   AP, oblique and lateral views of the left hand are ordered and reviewed.  No comparison films are available.  He has mild to moderate osteoarthritis of the carpometacarpal joint at the base of his thumb.  There is also moderate STT arthritis.  No lesions, masses or other significant findings.    Assessment/Plan: Left hand Dupuytren's disease    We discussed the natural history of this condition and his options.  I recommend expectant management for now as his cord appears fairly small and his mobility is good.  If things progress, I told him to notify me and I will get him over to see Dr. Caitlin Epperson MD    09/16/2019    CC to Kashmir Pardo APRN    "

## 2019-09-25 RX ORDER — ROSUVASTATIN CALCIUM 10 MG/1
TABLET, COATED ORAL
Qty: 30 TABLET | Refills: 11 | Status: SHIPPED | OUTPATIENT
Start: 2019-09-25 | End: 2020-01-22 | Stop reason: SDUPTHER

## 2019-09-30 ENCOUNTER — OFFICE VISIT (OUTPATIENT)
Dept: FAMILY MEDICINE CLINIC | Facility: CLINIC | Age: 64
End: 2019-09-30

## 2019-09-30 VITALS
HEART RATE: 68 BPM | SYSTOLIC BLOOD PRESSURE: 118 MMHG | OXYGEN SATURATION: 98 % | WEIGHT: 294.6 LBS | BODY MASS INDEX: 39.9 KG/M2 | DIASTOLIC BLOOD PRESSURE: 80 MMHG | TEMPERATURE: 97.9 F | HEIGHT: 72 IN

## 2019-09-30 DIAGNOSIS — M54.12 CERVICAL RADICULOPATHY: Primary | ICD-10-CM

## 2019-09-30 DIAGNOSIS — R63.5 WEIGHT GAIN: ICD-10-CM

## 2019-09-30 PROCEDURE — 99213 OFFICE O/P EST LOW 20 MIN: CPT | Performed by: NURSE PRACTITIONER

## 2019-09-30 RX ORDER — CYCLOBENZAPRINE HCL 10 MG
10 TABLET ORAL 3 TIMES DAILY PRN
Qty: 60 TABLET | Refills: 0 | Status: SHIPPED | OUTPATIENT
Start: 2019-09-30 | End: 2020-01-22

## 2019-09-30 RX ORDER — METHYLPREDNISOLONE 4 MG/1
TABLET ORAL
Qty: 21 TABLET | Refills: 0 | Status: SHIPPED | OUTPATIENT
Start: 2019-09-30 | End: 2020-01-22

## 2019-09-30 NOTE — PROGRESS NOTES
"Subjective   Hoang So is a 64 y.o. male.     History of Present Illness   Hoang So 64 y.o. male who presents for evaluation of neck pain. Event that precipitated these symptoms: lifting heavy boxes 1 week ago. Onset of symptoms was 1 week ago, and have been unchanged since that time.  Location of this is in the  left area.   Current pain symptoms are described as sharp, throbbing and with paresthesia and occurs constantly.  The pain intensity is moderate.  Other associated symptoms include:  radiation of pain to left arm Treatment efforts have included: rest, OTC NSAIDS, prescription NSAIDS and acetaminophen without relief.  Patient has had previous herniated cervical disc.     Went to see Madelinerola and he suggested to have tsh checked bc cannot loss weight.   The following portions of the patient's history were reviewed and updated as appropriate: allergies, current medications, past social history and problem list.    Review of Systems   Musculoskeletal: Positive for neck pain.   All other systems reviewed and are negative.      Objective   /80 (BP Location: Left arm, Patient Position: Sitting)   Pulse 68   Temp 97.9 °F (36.6 °C)   Ht 182.9 cm (72.01\")   Wt 134 kg (294 lb 9.6 oz)   SpO2 98%   BMI 39.95 kg/m²   Physical Exam   Constitutional: He is oriented to person, place, and time. Vital signs are normal. He appears well-developed and well-nourished. No distress.   HENT:   Head: Normocephalic.   Cardiovascular: Normal rate, regular rhythm and normal heart sounds.   Pulmonary/Chest: Effort normal and breath sounds normal.   Neurological: He is alert and oriented to person, place, and time. Gait normal.   Psychiatric: He has a normal mood and affect. His behavior is normal. Judgment and thought content normal.   Vitals reviewed.      Assessment/Plan      Diagnosis Plan   1. Cervical radiculopathy  methylPREDNISolone (MEDROL) 4 MG tablet    cyclobenzaprine (FLEXERIL) 10 MG tablet   2. Weight gain  " TSH     Heat  rto prn  Needs PT but he is refusing right now   Kashmir Pardo, APRN  9/30/2019

## 2019-10-01 LAB — TSH SERPL DL<=0.005 MIU/L-ACNC: 2.58 UIU/ML (ref 0.27–4.2)

## 2019-10-08 ENCOUNTER — APPOINTMENT (OUTPATIENT)
Dept: SLEEP MEDICINE | Facility: HOSPITAL | Age: 64
End: 2019-10-08

## 2019-11-15 RX ORDER — POLYETHYLENE GLYCOL 3350 17 G/17G
POWDER, FOR SOLUTION ORAL
Qty: 30 PACKET | Refills: 2 | Status: SHIPPED | OUTPATIENT
Start: 2019-11-15 | End: 2020-03-18 | Stop reason: SDUPTHER

## 2019-12-16 ENCOUNTER — TELEPHONE (OUTPATIENT)
Dept: FAMILY MEDICINE CLINIC | Facility: CLINIC | Age: 64
End: 2019-12-16

## 2019-12-16 NOTE — TELEPHONE ENCOUNTER
Patient has been exposed to type b flu yesterday and today. His grandson was diagnosed today. The grandsons pediatrician advised pt to call his pcp and request a tamiflu prescription? Will [place] authorized?

## 2019-12-17 ENCOUNTER — TELEPHONE (OUTPATIENT)
Dept: FAMILY MEDICINE CLINIC | Facility: CLINIC | Age: 64
End: 2019-12-17

## 2019-12-17 RX ORDER — OSELTAMIVIR PHOSPHATE 75 MG/1
75 CAPSULE ORAL DAILY
Qty: 10 CAPSULE | Refills: 0 | Status: SHIPPED | OUTPATIENT
Start: 2019-12-17 | End: 2020-01-22

## 2019-12-17 NOTE — TELEPHONE ENCOUNTER
Patient wife left message stating they watch their grandson and he was diagnosed with Flu B was told by pediatrician to ask primary care for tamiflu as a preventive

## 2020-01-22 ENCOUNTER — OFFICE VISIT (OUTPATIENT)
Dept: SURGERY | Facility: CLINIC | Age: 65
End: 2020-01-22

## 2020-01-22 VITALS
HEIGHT: 72 IN | OXYGEN SATURATION: 98 % | BODY MASS INDEX: 39.55 KG/M2 | SYSTOLIC BLOOD PRESSURE: 136 MMHG | DIASTOLIC BLOOD PRESSURE: 68 MMHG | HEART RATE: 71 BPM | WEIGHT: 292 LBS | TEMPERATURE: 97.5 F

## 2020-01-22 DIAGNOSIS — D12.6 SERRATED ADENOMA OF COLON: ICD-10-CM

## 2020-01-22 DIAGNOSIS — K64.8 INTERNAL HEMORRHOIDS WITH COMPLICATION: ICD-10-CM

## 2020-01-22 DIAGNOSIS — K62.89 ANAL OR RECTAL PAIN: Primary | ICD-10-CM

## 2020-01-22 PROCEDURE — 99204 OFFICE O/P NEW MOD 45 MIN: CPT | Performed by: COLON & RECTAL SURGERY

## 2020-01-22 PROCEDURE — 46600 DIAGNOSTIC ANOSCOPY SPX: CPT | Performed by: COLON & RECTAL SURGERY

## 2020-01-22 RX ORDER — DIPHENHYDRAMINE HCL 25 MG
25 CAPSULE ORAL EVERY 6 HOURS PRN
COMMUNITY
End: 2022-12-01

## 2020-01-22 RX ORDER — SENNA AND DOCUSATE SODIUM 50; 8.6 MG/1; MG/1
1 TABLET, FILM COATED ORAL AS NEEDED
COMMUNITY
End: 2020-10-07

## 2020-01-22 RX ORDER — SODIUM CHLORIDE, SODIUM LACTATE, POTASSIUM CHLORIDE, CALCIUM CHLORIDE 600; 310; 30; 20 MG/100ML; MG/100ML; MG/100ML; MG/100ML
30 INJECTION, SOLUTION INTRAVENOUS CONTINUOUS
Status: CANCELLED | OUTPATIENT
Start: 2020-01-22
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
## 2020-02-25 RX ORDER — RIVAROXABAN 20 MG/1
TABLET, FILM COATED ORAL
Qty: 90 TABLET | Refills: 0 | Status: SHIPPED | OUTPATIENT
Start: 2020-02-25 | End: 2020-05-21

## 2020-03-02 ENCOUNTER — OFFICE VISIT (OUTPATIENT)
Dept: CARDIOLOGY | Facility: CLINIC | Age: 65
End: 2020-03-02

## 2020-03-02 VITALS
SYSTOLIC BLOOD PRESSURE: 142 MMHG | BODY MASS INDEX: 39.28 KG/M2 | WEIGHT: 290 LBS | HEIGHT: 72 IN | DIASTOLIC BLOOD PRESSURE: 84 MMHG | HEART RATE: 63 BPM

## 2020-03-02 DIAGNOSIS — E66.9 OBESITY (BMI 30-39.9): ICD-10-CM

## 2020-03-02 DIAGNOSIS — G47.33 OSA (OBSTRUCTIVE SLEEP APNEA): ICD-10-CM

## 2020-03-02 DIAGNOSIS — I10 ESSENTIAL HYPERTENSION: ICD-10-CM

## 2020-03-02 DIAGNOSIS — I48.0 PAF (PAROXYSMAL ATRIAL FIBRILLATION) (HCC): Primary | ICD-10-CM

## 2020-03-02 PROCEDURE — 99213 OFFICE O/P EST LOW 20 MIN: CPT | Performed by: NURSE PRACTITIONER

## 2020-03-02 PROCEDURE — 93000 ELECTROCARDIOGRAM COMPLETE: CPT | Performed by: NURSE PRACTITIONER
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
## 2020-03-18 DIAGNOSIS — K64.8 INTERNAL HEMORRHOIDS WITH COMPLICATION: Primary | ICD-10-CM

## 2020-03-18 DIAGNOSIS — K59.04 CHRONIC IDIOPATHIC CONSTIPATION: ICD-10-CM

## 2020-03-18 NOTE — TELEPHONE ENCOUNTER
Pt called wanting to know if we can call in 90 day Rx for Miralax. Says at last office visit you discussed this with him and told him if RX is cheaper than OTC to call us and we would send in Rx. [place] [facility] [place]

## 2020-05-21 RX ORDER — RIVAROXABAN 20 MG/1
TABLET, FILM COATED ORAL
Qty: 90 TABLET | Refills: 0 | Status: SHIPPED | OUTPATIENT
Start: 2020-05-21 | End: 2020-08-17

## 2020-07-13 ENCOUNTER — PREP FOR SURGERY (OUTPATIENT)
Dept: OTHER | Facility: HOSPITAL | Age: 65
End: 2020-07-13

## 2020-07-13 DIAGNOSIS — Z86.010 HISTORY OF COLON POLYPS: Primary | ICD-10-CM

## 2020-07-13 PROBLEM — Z86.0100 HISTORY OF COLON POLYPS: Status: ACTIVE | Noted: 2020-07-13

## 2020-08-17 RX ORDER — RIVAROXABAN 20 MG/1
TABLET, FILM COATED ORAL
Qty: 90 TABLET | Refills: 0 | Status: SHIPPED | OUTPATIENT
Start: 2020-08-17 | End: 2020-10-07 | Stop reason: SDUPTHER

## 2020-09-03 ENCOUNTER — TRANSCRIBE ORDERS (OUTPATIENT)
Dept: SLEEP MEDICINE | Facility: HOSPITAL | Age: 65
End: 2020-09-03

## 2020-09-03 DIAGNOSIS — Z01.818 OTHER SPECIFIED PRE-OPERATIVE EXAMINATION: Primary | ICD-10-CM

## 2020-09-08 ENCOUNTER — LAB (OUTPATIENT)
Dept: LAB | Facility: HOSPITAL | Age: 65
End: 2020-09-08

## 2020-09-08 DIAGNOSIS — Z01.818 OTHER SPECIFIED PRE-OPERATIVE EXAMINATION: ICD-10-CM

## 2020-09-08 PROCEDURE — C9803 HOPD COVID-19 SPEC COLLECT: HCPCS

## 2020-09-08 PROCEDURE — U0004 COV-19 TEST NON-CDC HGH THRU: HCPCS

## 2020-09-09 LAB — SARS-COV-2 RNA RESP QL NAA+PROBE: NOT DETECTED

## 2020-09-10 ENCOUNTER — HOSPITAL ENCOUNTER (OUTPATIENT)
Facility: HOSPITAL | Age: 65
Setting detail: HOSPITAL OUTPATIENT SURGERY
Discharge: HOME | End: 2020-09-10
Attending: COLON & RECTAL SURGERY | Admitting: COLON & RECTAL SURGERY

## 2020-09-10 ENCOUNTER — ANESTHESIA (OUTPATIENT)
Dept: GASTROENTEROLOGY | Facility: HOSPITAL | Age: 65
End: 2020-09-10

## 2020-09-10 ENCOUNTER — ANESTHESIA EVENT (OUTPATIENT)
Dept: GASTROENTEROLOGY | Facility: HOSPITAL | Age: 65
End: 2020-09-10

## 2020-09-10 VITALS
DIASTOLIC BLOOD PRESSURE: 100 MMHG | HEIGHT: 72 IN | BODY MASS INDEX: 39.28 KG/M2 | HEART RATE: 72 BPM | WEIGHT: 290 LBS | SYSTOLIC BLOOD PRESSURE: 136 MMHG | RESPIRATION RATE: 18 BRPM | OXYGEN SATURATION: 93 %

## 2020-09-10 DIAGNOSIS — Z86.010 HISTORY OF COLON POLYPS: ICD-10-CM

## 2020-09-10 DIAGNOSIS — D12.6 SERRATED ADENOMA OF COLON: ICD-10-CM

## 2020-09-10 PROCEDURE — 45380 COLONOSCOPY AND BIOPSY: CPT | Performed by: COLON & RECTAL SURGERY

## 2020-09-10 PROCEDURE — 25010000002 PROPOFOL 10 MG/ML EMULSION: Performed by: NURSE ANESTHETIST, CERTIFIED REGISTERED

## 2020-09-10 PROCEDURE — 88305 TISSUE EXAM BY PATHOLOGIST: CPT | Performed by: COLON & RECTAL SURGERY

## 2020-09-10 RX ORDER — PROPOFOL 10 MG/ML
VIAL (ML) INTRAVENOUS AS NEEDED
Status: DISCONTINUED | OUTPATIENT
Start: 2020-09-10 | End: 2020-09-10 | Stop reason: SURG

## 2020-09-10 RX ORDER — SODIUM CHLORIDE, SODIUM LACTATE, POTASSIUM CHLORIDE, CALCIUM CHLORIDE 600; 310; 30; 20 MG/100ML; MG/100ML; MG/100ML; MG/100ML
30 INJECTION, SOLUTION INTRAVENOUS CONTINUOUS
Status: DISCONTINUED | OUTPATIENT
Start: 2020-09-10 | End: 2020-09-10 | Stop reason: HOSPADM

## 2020-09-10 RX ORDER — PROPOFOL 10 MG/ML
VIAL (ML) INTRAVENOUS CONTINUOUS PRN
Status: DISCONTINUED | OUTPATIENT
Start: 2020-09-10 | End: 2020-09-10 | Stop reason: SURG

## 2020-09-10 RX ORDER — LIDOCAINE HYDROCHLORIDE 20 MG/ML
INJECTION, SOLUTION INFILTRATION; PERINEURAL AS NEEDED
Status: DISCONTINUED | OUTPATIENT
Start: 2020-09-10 | End: 2020-09-10 | Stop reason: SURG

## 2020-09-10 RX ADMIN — LIDOCAINE HYDROCHLORIDE 60 MG: 20 INJECTION, SOLUTION INFILTRATION; PERINEURAL at 13:21

## 2020-09-10 RX ADMIN — PROPOFOL 180 MCG/KG/MIN: 10 INJECTION, EMULSION INTRAVENOUS at 13:24

## 2020-09-10 RX ADMIN — SODIUM CHLORIDE, POTASSIUM CHLORIDE, SODIUM LACTATE AND CALCIUM CHLORIDE: 600; 310; 30; 20 INJECTION, SOLUTION INTRAVENOUS at 13:19

## 2020-09-10 RX ADMIN — PROPOFOL 150 MG: 10 INJECTION, EMULSION INTRAVENOUS at 13:23

## 2020-09-10 NOTE — ANESTHESIA PREPROCEDURE EVALUATION
Anesthesia Evaluation     Patient summary reviewed and Nursing notes reviewed                Airway   Mallampati: I  TM distance: >3 FB  Neck ROM: full  No difficulty expected  Dental - normal exam     Pulmonary - normal exam   (+) a smoker Former, shortness of breath, sleep apnea,   Cardiovascular - normal exam    (+) hypertension, dysrhythmias Atrial Fib, GODOY, hyperlipidemia,       Neuro/Psych  (+) dizziness/light headedness, numbness,     GI/Hepatic/Renal/Endo    (+) obesity, morbid obesity,      Musculoskeletal     Abdominal  - normal exam    Bowel sounds: normal.   Substance History - negative use     OB/GYN negative ob/gyn ROS         Other   arthritis,                      Anesthesia Plan    ASA 3     MAC       Anesthetic plan, all risks, benefits, and alternatives have been provided, discussed and informed consent has been obtained with: patient.

## 2020-09-10 NOTE — ANESTHESIA POSTPROCEDURE EVALUATION
"Patient: Hoang So    Procedure Summary     Date:  09/10/20 Room / Location:  [facility] ENDOSCOPY 9 /  [facility]    Anesthesia Start:  1319 Anesthesia Stop:  1340    Procedure:  COLONOSCOPY TO CECUM WITH COLD BIOPSIES POLYPECTOMY (N/A ) Diagnosis:       History of colon polyps      (History of colon polyps [Z86.010])    Surgeon:  Romero Stewart MD Provider:  Ambrocio Mohan MD    Anesthesia Type:  MAC ASA Status:  3          Anesthesia Type: MAC    Vitals  Vitals Value Taken Time   /100 9/10/2020  1:59 PM   Temp     Pulse 72 9/10/2020  1:59 PM   Resp 18 9/10/2020  1:59 PM   SpO2 93 % 9/10/2020  1:59 PM           Post Anesthesia Care and Evaluation    Patient location during evaluation: PACU  Patient participation: complete - patient participated  Level of consciousness: awake  Pain score: 0  Pain management: adequate  Airway patency: patent  Anesthetic complications: No anesthetic complications  PONV Status: none  Cardiovascular status: acceptable  Respiratory status: acceptable  Hydration status: acceptable    Comments: /100   Pulse 72   Resp 18   Ht 182.9 cm (72\")   Wt 132 kg (290 lb)   SpO2 93%   BMI 39.33 kg/m²       "
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
## 2020-09-16 ENCOUNTER — OFFICE VISIT (OUTPATIENT)
Dept: CARDIOLOGY | Facility: CLINIC | Age: 65
End: 2020-09-16

## 2020-09-16 VITALS
WEIGHT: 294 LBS | SYSTOLIC BLOOD PRESSURE: 118 MMHG | HEIGHT: 72 IN | OXYGEN SATURATION: 98 % | BODY MASS INDEX: 39.82 KG/M2 | TEMPERATURE: 96.7 F | HEART RATE: 76 BPM | DIASTOLIC BLOOD PRESSURE: 80 MMHG

## 2020-09-16 DIAGNOSIS — E78.5 HYPERLIPIDEMIA, UNSPECIFIED HYPERLIPIDEMIA TYPE: ICD-10-CM

## 2020-09-16 DIAGNOSIS — I48.0 PAF (PAROXYSMAL ATRIAL FIBRILLATION) (HCC): Primary | ICD-10-CM

## 2020-09-16 DIAGNOSIS — I10 BENIGN ESSENTIAL HYPERTENSION: ICD-10-CM

## 2020-09-16 DIAGNOSIS — G47.33 OSA (OBSTRUCTIVE SLEEP APNEA): ICD-10-CM

## 2020-09-16 DIAGNOSIS — I10 ESSENTIAL HYPERTENSION: ICD-10-CM

## 2020-09-16 DIAGNOSIS — R06.09 DYSPNEA ON EXERTION: ICD-10-CM

## 2020-09-16 PROCEDURE — 99214 OFFICE O/P EST MOD 30 MIN: CPT | Performed by: INTERNAL MEDICINE

## 2020-09-16 PROCEDURE — 93000 ELECTROCARDIOGRAM COMPLETE: CPT | Performed by: INTERNAL MEDICINE

## 2020-09-16 NOTE — PROGRESS NOTES
PATIENTINFORMATION    Date of Office Visit: 20  Encounter Provider: Niki Aguillon MD  Place of Service: [facility] CARDIOLOGY  Patient Name: Hoang So  : 1955    Subjective:         Patient ID: Hoang So is a 65 y.o. male.      History of Present Illness    This is a nice gentleman who presented to the cardiac evaluation clinic in 10/2017.  He complained of dizziness and shortness of breath.  He was found to be having paroxysms of atrial fibrillation.  He was given IV Lasix and IV diltiazem as well as IV metoprolol.  He had an echocardiogram which revealed asymmetric left ventricular hypertrophy, normal LV systolic function, and no significant valvular heart disease.  He was discharged on oral diltiazem.  He had recurrent, symptomatic atrial fibrillation and was started on propafenone and Xarelto.  He had a treadmill stress test in 2017 which did not show any evidence of ischemia.    He has been doing well for the last year.  He denies any chest pain.  He has baseline shortness of breath on exertion.  He denies lower extremity edema or palpitations.  He checks his blood pressure and heart rate regularly and has not seen any evidence of atrial fibrillation.    Review of Systems   Constitution: Negative for fever, malaise/fatigue, weight gain and weight loss.   HENT: Positive for hearing loss. Negative for ear pain, nosebleeds and sore throat.    Eyes: Negative for double vision, pain, vision loss in left eye and vision loss in right eye.   Cardiovascular:        See history of present illness.   Respiratory: Positive for snoring. Negative for cough, shortness of breath, sleep disturbances due to breathing and wheezing.    Endocrine: Negative for cold intolerance, heat intolerance and polyuria.   Skin: Negative for itching, poor wound healing and rash.   Musculoskeletal: Negative for joint pain, joint swelling and myalgias.   Gastrointestinal: Negative for  "abdominal pain, diarrhea, hematochezia, nausea and vomiting.   Genitourinary: Negative for hematuria and hesitancy.   Neurological: Negative for numbness, paresthesias and seizures.   Psychiatric/Behavioral: Negative for depression. The patient is not nervous/anxious.            ECG 12 Lead    Date/Time: 9/16/2020 10:58 AM  Performed by: Niki Aguillon MD  Authorized by: Niki Aguillon MD   Comparison: compared with previous ECG from 3/2/2020  Similar to previous ECG  Rhythm: sinus rhythm  BPM: 73  Conduction: conduction normal  ST Segments: ST segments normal  T Waves: T waves normal    Clinical impression: normal ECG               Objective:     /80 (BP Location: Left arm)   Pulse 76   Temp 96.7 °F (35.9 °C) (Infrared)   Ht 182.9 cm (72\")   Wt 133 kg (294 lb)   SpO2 98%   BMI 39.87 kg/m²  Body mass index is 39.87 kg/m².     Vitals signs reviewed.   Constitutional:       Appearance: Normal appearance. Well-developed.   Eyes:      General: Lids are normal. Lids are everted, no foreign bodies appreciated.      Conjunctiva/sclera: Conjunctivae normal.      Pupils: Pupils are equal, round, and reactive to light.   HENT:      Head: Normocephalic and atraumatic.   Neck:      Musculoskeletal: Normal range of motion.      Thyroid: No thyroid mass.      Vascular: No carotid bruit or JVD.      Trachea: No tracheal deviation.   Pulmonary:      Effort: Pulmonary effort is normal.      Breath sounds: Normal breath sounds.   Cardiovascular:      Normal rate. Regular rhythm.   Pulses:     Dorsalis pedis: 2+ bilaterally.  Abdominal:      General: Bowel sounds are normal.   Musculoskeletal: Normal range of motion.   Skin:     General: Skin is warm and dry.   Neurological:      Mental Status: Alert.   Psychiatric:         Behavior: Behavior normal.         Lab Review: I reviewed labs from August 2019.  I encouraged him to call Kashmir Pardo to schedule follow-up appointment as it looks like he is due for routine lab " work.      Assessment/Plan:       1.  Persistent atrial fibrillation.  Chads 2 Vascor of 2.  Continue rivaroxaban, propafenone and metoprolol tartrate.  He also follows up with Dr. Lee and Rajani.  It is been 3 years since he has had an echo or an ischemic evaluation so did not go ahead and check that and make sure that he is still safe to continue with the pro-path and known.  2.  Hypertension  3.  Hyperlipidemia.  On rosuvastatin  4.  Obstructive sleep apnea.  On CPAP  5.  Obesity    Overall he is stable.  Check an echo and a treadmill.  Follow-up with me in 1 year if that is normal.  Keep follow-up with Dr. Lee    Orders Placed This Encounter   Procedures   • Treadmill Stress Test     Standing Status:   Future     Standing Expiration Date:   9/16/2021     Order Specific Question:   Reason for exam?     Answer:   Chest Pain   • ECG 12 Lead     This order was created via procedure documentation   • Adult Transthoracic Echo Complete W/ Cont if Necessary Per Protocol     Standing Status:   Future     Standing Expiration Date:   9/16/2021     Order Specific Question:   Reason for exam?     Answer:   Dyspnea        Discharge Medications          Accurate as of September 16, 2020 10:59 AM. If you have any questions, ask your nurse or doctor.            Changes to Medications      Instructions Start Date   Xarelto 20 MG tablet  Generic drug: rivaroxaban  What changed:   · how much to take  · additional instructions   TAKE ONE TABLET BY MOUTH DAILY         Continue These Medications      Instructions Start Date   acetaminophen 500 MG tablet  Commonly known as: TYLENOL   1,000 mg, Oral, Every 6 Hours PRN      diphenhydrAMINE 25 mg capsule  Commonly known as: BENADRYL   25 mg, Oral, Every 6 Hours PRN      hydrocortisone 2.5 % rectal cream  Commonly known as: ANUSOL-HC   Insert a small amount of cream into rectum bid x7 days      metoprolol tartrate 25 MG tablet  Commonly known as: LOPRESSOR   12.5 mg, Oral, Every  12 Hours Scheduled      propafenone 225 MG tablet  Commonly known as: RYTHMOL   225 mg, Oral, Every 8 Hours      rosuvastatin 10 MG tablet  Commonly known as: CRESTOR   5 mg, Oral, Nightly      sennosides-docusate 8.6-50 MG per tablet  Commonly known as: PERICOLACE   1 tablet, Oral, As Needed                    Niki Aguillon MD  09/16/20  10:59 EDT

## 2020-10-07 ENCOUNTER — OFFICE VISIT (OUTPATIENT)
Dept: FAMILY MEDICINE CLINIC | Facility: CLINIC | Age: 65
End: 2020-10-07

## 2020-10-07 VITALS
WEIGHT: 296.8 LBS | DIASTOLIC BLOOD PRESSURE: 76 MMHG | TEMPERATURE: 97.5 F | BODY MASS INDEX: 40.2 KG/M2 | SYSTOLIC BLOOD PRESSURE: 124 MMHG | HEART RATE: 73 BPM | OXYGEN SATURATION: 98 % | HEIGHT: 72 IN

## 2020-10-07 DIAGNOSIS — I48.0 PAROXYSMAL ATRIAL FIBRILLATION (HCC): ICD-10-CM

## 2020-10-07 DIAGNOSIS — Z13.0 SCREENING FOR IRON DEFICIENCY ANEMIA: ICD-10-CM

## 2020-10-07 DIAGNOSIS — I10 HYPERTENSION, UNSPECIFIED TYPE: ICD-10-CM

## 2020-10-07 DIAGNOSIS — Z12.5 SPECIAL SCREENING FOR MALIGNANT NEOPLASM OF PROSTATE: ICD-10-CM

## 2020-10-07 DIAGNOSIS — E78.5 HYPERLIPIDEMIA, UNSPECIFIED HYPERLIPIDEMIA TYPE: Primary | ICD-10-CM

## 2020-10-07 DIAGNOSIS — E78.2 MIXED HYPERLIPIDEMIA: ICD-10-CM

## 2020-10-07 PROBLEM — Z86.010 HISTORY OF COLON POLYPS: Status: RESOLVED | Noted: 2020-07-13 | Resolved: 2020-10-07

## 2020-10-07 PROBLEM — Z86.0100 HISTORY OF COLON POLYPS: Status: RESOLVED | Noted: 2020-07-13 | Resolved: 2020-10-07

## 2020-10-07 PROCEDURE — 99214 OFFICE O/P EST MOD 30 MIN: CPT | Performed by: NURSE PRACTITIONER

## 2020-10-07 RX ORDER — PROPAFENONE HYDROCHLORIDE 225 MG/1
225 TABLET, FILM COATED ORAL EVERY 8 HOURS
Qty: 270 TABLET | Refills: 3 | Status: SHIPPED | OUTPATIENT
Start: 2020-10-07 | End: 2021-10-07

## 2020-10-07 RX ORDER — POLYETHYLENE GLYCOL 3350 17 G/17G
17 POWDER, FOR SOLUTION ORAL DAILY
COMMUNITY

## 2020-10-07 RX ORDER — ROSUVASTATIN CALCIUM 10 MG/1
5 TABLET, COATED ORAL NIGHTLY
Qty: 90 TABLET | Refills: 3
Start: 2020-10-07 | End: 2020-11-13

## 2020-10-07 NOTE — PROGRESS NOTES
"Subjective   Hoang So is a 65 y.o. male.     History of Present Illness    Since the last visit, he has overall felt well.  He has Essential Hypertension and well controlled on current medication and rechecking lipids, a fib controlled saw cardiologist last week.  he has been compliant with current medications have reviewed them.  The patient denies medication side effects.  Will refill medications. /76   Pulse 73   Temp 97.5 °F (36.4 °C)   Ht 182.9 cm (72.01\")   Wt 135 kg (296 lb 12.8 oz)   SpO2 98%   BMI 40.24 kg/m²     Results for orders placed or performed during the hospital encounter of 09/10/20   Tissue Pathology Exam    Specimen: Large Intestine, Left / Descending Colon; Polyp   Result Value Ref Range    Case Report       Surgical Pathology Report                         Case: [id number]                                  Authorizing Provider:  Romero Stewart MD        Collected:           09/10/2020 01:32 PM          Ordering Location:     [facility]  Received:            09/10/2020 02:28 PM                                 ENDO SUITES                                                                  Pathologist:           Codie Crenshaw MD                                                          Specimen:    Large Intestine, Left / Descending Colon, DESCENDING COLON POLYP                           Final Diagnosis       1. Descending Colon, Polyp, Polypectomy:   A. Hyperplastic polyp.    jab/pkm       Gross Description       1. The specimen is received in formalin labeled with the patient's name and further designated 'descending colon polyp biopsy' is a small fragment of gray-tan tissue. The specimen is submitted for embedding as received.        Microscopic Description       Performed, incorporated in diagnosis.           The following portions of the patient's history were reviewed and updated as appropriate: allergies, current medications, past social history and problem " "list.    Review of Systems   Constitutional: Negative for fever.   Respiratory: Negative for cough and shortness of breath.    Cardiovascular: Negative for chest pain.   Gastrointestinal: Negative for abdominal pain.   Neurological: Negative for dizziness.       Objective   /76   Pulse 73   Temp 97.5 °F (36.4 °C)   Ht 182.9 cm (72.01\")   Wt 135 kg (296 lb 12.8 oz)   SpO2 98%   BMI 40.24 kg/m²   Physical Exam  Vitals signs reviewed.   Constitutional:       General: He is not in acute distress.     Appearance: He is well-developed.   HENT:      Head: Normocephalic.   Cardiovascular:      Rate and Rhythm: Normal rate and regular rhythm.      Heart sounds: Normal heart sounds.   Pulmonary:      Effort: Pulmonary effort is normal.      Breath sounds: Normal breath sounds.   Neurological:      Mental Status: He is alert and oriented to person, place, and time.      Gait: Gait normal.   Psychiatric:         Behavior: Behavior normal.         Thought Content: Thought content normal.         Judgment: Judgment normal.         Assessment/Plan      Diagnosis Plan   1. Hyperlipidemia, unspecified hyperlipidemia type  Comprehensive Metabolic Panel    Lipid Panel With LDL / HDL Ratio   2. Screening for iron deficiency anemia  CBC & Differential   3. Special screening for malignant neoplasm of prostate  PSA Screen   4. Mixed hyperlipidemia  rosuvastatin (CRESTOR) 10 MG tablet   5. Hypertension, unspecified type  metoprolol tartrate (LOPRESSOR) 25 MG tablet   6. Paroxysmal atrial fibrillation (CMS/HCC)  propafenone (RYTHMOL) 225 MG tablet    rivaroxaban (Xarelto) 20 MG tablet     Follow up after labs  Cont same with meds    Mask and googles worn    ASAEL Mai  10/7/2020  "

## 2020-10-08 LAB
ALBUMIN SERPL-MCNC: 4.6 G/DL (ref 3.5–5.2)
ALBUMIN/GLOB SERPL: 1.9 G/DL
ALP SERPL-CCNC: 93 U/L (ref 39–117)
ALT SERPL-CCNC: 24 U/L (ref 1–41)
AST SERPL-CCNC: 17 U/L (ref 1–40)
BASOPHILS # BLD AUTO: 0.13 10*3/MM3 (ref 0–0.2)
BASOPHILS NFR BLD AUTO: 1.8 % (ref 0–1.5)
BILIRUB SERPL-MCNC: 0.3 MG/DL (ref 0–1.2)
BUN SERPL-MCNC: 15 MG/DL (ref 8–23)
BUN/CREAT SERPL: 12.7 (ref 7–25)
CALCIUM SERPL-MCNC: 9.3 MG/DL (ref 8.6–10.5)
CHLORIDE SERPL-SCNC: 102 MMOL/L (ref 98–107)
CHOLEST SERPL-MCNC: 152 MG/DL (ref 0–200)
CO2 SERPL-SCNC: 26.4 MMOL/L (ref 22–29)
CREAT SERPL-MCNC: 1.18 MG/DL (ref 0.76–1.27)
EOSINOPHIL # BLD AUTO: 0.23 10*3/MM3 (ref 0–0.4)
EOSINOPHIL NFR BLD AUTO: 3.1 % (ref 0.3–6.2)
ERYTHROCYTE [DISTWIDTH] IN BLOOD BY AUTOMATED COUNT: 12.8 % (ref 12.3–15.4)
GLOBULIN SER CALC-MCNC: 2.4 GM/DL
GLUCOSE SERPL-MCNC: 125 MG/DL (ref 65–99)
HCT VFR BLD AUTO: 47.7 % (ref 37.5–51)
HDLC SERPL-MCNC: 37 MG/DL (ref 40–60)
HGB BLD-MCNC: 16.1 G/DL (ref 13–17.7)
IMM GRANULOCYTES # BLD AUTO: 0.04 10*3/MM3 (ref 0–0.05)
IMM GRANULOCYTES NFR BLD AUTO: 0.5 % (ref 0–0.5)
LDLC SERPL CALC-MCNC: 78 MG/DL (ref 0–100)
LDLC/HDLC SERPL: 2.11 {RATIO}
LYMPHOCYTES # BLD AUTO: 2.41 10*3/MM3 (ref 0.7–3.1)
LYMPHOCYTES NFR BLD AUTO: 33 % (ref 19.6–45.3)
MCH RBC QN AUTO: 30.2 PG (ref 26.6–33)
MCHC RBC AUTO-ENTMCNC: 33.8 G/DL (ref 31.5–35.7)
MCV RBC AUTO: 89.5 FL (ref 79–97)
MONOCYTES # BLD AUTO: 0.63 10*3/MM3 (ref 0.1–0.9)
MONOCYTES NFR BLD AUTO: 8.6 % (ref 5–12)
NEUTROPHILS # BLD AUTO: 3.87 10*3/MM3 (ref 1.7–7)
NEUTROPHILS NFR BLD AUTO: 53 % (ref 42.7–76)
NRBC BLD AUTO-RTO: 0 /100 WBC (ref 0–0.2)
PLATELET # BLD AUTO: 246 10*3/MM3 (ref 140–450)
POTASSIUM SERPL-SCNC: 4.4 MMOL/L (ref 3.5–5.2)
PROT SERPL-MCNC: 7 G/DL (ref 6–8.5)
PSA SERPL-MCNC: 0.77 NG/ML (ref 0–4)
RBC # BLD AUTO: 5.33 10*6/MM3 (ref 4.14–5.8)
SODIUM SERPL-SCNC: 140 MMOL/L (ref 136–145)
TRIGL SERPL-MCNC: 185 MG/DL (ref 0–150)
VLDLC SERPL CALC-MCNC: 37 MG/DL
WBC # BLD AUTO: 7.31 10*3/MM3 (ref 3.4–10.8)

## 2020-11-13 DIAGNOSIS — E78.2 MIXED HYPERLIPIDEMIA: ICD-10-CM

## 2020-11-13 RX ORDER — ROSUVASTATIN CALCIUM 10 MG/1
TABLET, COATED ORAL
Qty: 90 TABLET | Refills: 10 | Status: SHIPPED | OUTPATIENT
Start: 2020-11-13 | End: 2021-12-20

## 2020-12-18 ENCOUNTER — OFFICE VISIT (OUTPATIENT)
Dept: FAMILY MEDICINE CLINIC | Facility: CLINIC | Age: 65
End: 2020-12-18

## 2020-12-18 VITALS
SYSTOLIC BLOOD PRESSURE: 128 MMHG | HEIGHT: 72 IN | DIASTOLIC BLOOD PRESSURE: 74 MMHG | OXYGEN SATURATION: 98 % | TEMPERATURE: 97.1 F | BODY MASS INDEX: 39.96 KG/M2 | WEIGHT: 295 LBS | HEART RATE: 84 BPM

## 2020-12-18 DIAGNOSIS — Z23 NEED FOR INFLUENZA VACCINATION: ICD-10-CM

## 2020-12-18 DIAGNOSIS — Z00.00 MEDICARE ANNUAL WELLNESS VISIT, INITIAL: Primary | ICD-10-CM

## 2020-12-18 DIAGNOSIS — Z23 NEED FOR PNEUMOCOCCAL VACCINATION: ICD-10-CM

## 2020-12-18 PROCEDURE — 1170F FXNL STATUS ASSESSED: CPT | Performed by: NURSE PRACTITIONER

## 2020-12-18 PROCEDURE — 90670 PCV13 VACCINE IM: CPT | Performed by: NURSE PRACTITIONER

## 2020-12-18 PROCEDURE — G0009 ADMIN PNEUMOCOCCAL VACCINE: HCPCS | Performed by: NURSE PRACTITIONER

## 2020-12-18 PROCEDURE — G0008 ADMIN INFLUENZA VIRUS VAC: HCPCS | Performed by: NURSE PRACTITIONER

## 2020-12-18 PROCEDURE — 1159F MED LIST DOCD IN RCRD: CPT | Performed by: NURSE PRACTITIONER

## 2020-12-18 PROCEDURE — 90694 VACC AIIV4 NO PRSRV 0.5ML IM: CPT | Performed by: NURSE PRACTITIONER

## 2020-12-18 PROCEDURE — 96160 PT-FOCUSED HLTH RISK ASSMT: CPT | Performed by: NURSE PRACTITIONER

## 2020-12-18 PROCEDURE — G0438 PPPS, INITIAL VISIT: HCPCS | Performed by: NURSE PRACTITIONER

## 2020-12-18 NOTE — PROGRESS NOTES
The ABCs of the Annual Wellness Visit  Initial Medicare Wellness Visit    Chief Complaint   Patient presents with   • Medicare Wellness-Initial Visit     Patient is not fasting ( wore mask and goggles)        Subjective   History of Present Illness:  Hoang So is a 65 y.o. male who presents for an Initial Medicare Wellness Visit.    HEALTH RISK ASSESSMENT    Recent Hospitalizations:  No hospitalization(s) within the last year.    Current Medical Providers:  Patient Care Team:  Kashmir Pardo APRN as PCP - General (Family Medicine)  Dima Vallejo MD as Consulting Physician (Gastroenterology)    Smoking Status:  Social History     Tobacco Use   Smoking Status Former Smoker   • Packs/day: 0.25   • Years: 10.00   • Pack years: 2.50   • Types: Cigarettes   • Quit date: 2008   • Years since quittin.9   Smokeless Tobacco Never Used   Tobacco Comment    daily caffiene       Alcohol Consumption:  Social History     Substance and Sexual Activity   Alcohol Use Yes    Comment: 2-3 times/week        Depression Screen:   PHQ-2/PHQ-9 Depression Screening 2020   Little interest or pleasure in doing things 0   Feeling down, depressed, or hopeless 0   Total Score 0       Fall Risk Screen:  STEADI Fall Risk Assessment has not been completed.    Health Habits and Functional and Cognitive Screening:  Functional & Cognitive Status 2020   Do you have difficulty preparing food and eating? No   Do you have difficulty bathing yourself, getting dressed or grooming yourself? No   Do you have difficulty using the toilet? No   Do you have difficulty moving around from place to place? No   Do you have trouble with steps or getting out of a bed or a chair? No   Current Diet Limited Junk Food   Dental Exam Up to date   Eye Exam Not up to date   Exercise (times per week) 7 times per week   Current Exercise Activities Include Walking   Do you need help using the phone?  No   Are you deaf or do you have serious difficulty  hearing?  No   Do you need help with transportation? No   Do you need help shopping? No   Do you need help preparing meals?  No   Do you need help with housework?  No   Do you need help with laundry? No   Do you need help taking your medications? No   Do you need help managing money? No   Do you ever drive or ride in a car without wearing a seat belt? No         Does the patient have evidence of cognitive impairment? No    Asprin use counseling:Taking ASA appropriately as indicated    Age-appropriate Screening Schedule:  Refer to the list below for future screening recommendations based on patient's age, sex and/or medical conditions. Orders for these recommended tests are listed in the plan section. The patient has been provided with a written plan.    Health Maintenance   Topic Date Due   • TDAP/TD VACCINES (1 - Tdap) 08/16/1972   • ZOSTER VACCINE (1 of 2) 08/16/2003   • INFLUENZA VACCINE  08/01/2020   • LIPID PANEL  10/07/2021   • COLONOSCOPY  09/10/2030          The following portions of the patient's history were reviewed and updated as appropriate: allergies, current medications, past family history, past medical history, past social history, past surgical history and problem list.    Outpatient Medications Prior to Visit   Medication Sig Dispense Refill   • acetaminophen (TYLENOL) 500 MG tablet Take 1,000 mg by mouth Every 6 (Six) Hours As Needed for Mild Pain .     • diphenhydrAMINE (BENADRYL) 25 mg capsule Take 25 mg by mouth Every 6 (Six) Hours As Needed for Itching.     • metoprolol tartrate (LOPRESSOR) 25 MG tablet Take 0.5 tablets by mouth Every 12 (Twelve) Hours. 90 tablet 3   • polyethylene glycol (MiraLax) 17 g packet Take 17 g by mouth Daily.     • propafenone (RYTHMOL) 225 MG tablet Take 1 tablet by mouth Every 8 (Eight) Hours. 270 tablet 3   • rivaroxaban (Xarelto) 20 MG tablet Take 1 tablet by mouth Daily. 90 tablet 3   • rosuvastatin (CRESTOR) 10 MG tablet TAKE ONE TABLET BY MOUTH EVERY NIGHT AT  "BEDTIME 90 tablet 10     No facility-administered medications prior to visit.        Patient Active Problem List   Diagnosis   • Hyperlipidemia   • Hypertension   • Low back pain   • Leg pain, bilateral   • Snoring   • Benign essential hypertension   • Cervical radiculopathy   • Cervical stenosis of spine   • Lumbar radiculopathy   • Atrial fibrillation (CMS/HCC)   • SULLY (obstructive sleep apnea)   • Hypersomnia   • PAF (paroxysmal atrial fibrillation) (CMS/HCC)   • Obesity (BMI 30-39.9)   • Anal fistula   • Screening for iron deficiency anemia   • Special screening for malignant neoplasm of prostate   • Trigger ring finger of left hand   • Morbid obesity (CMS/HCC)   • Weight gain       Advanced Care Planning:  ACP discussion was held with the patient during this visit. Patient has an advance directive (not in EMR), copy requested.    Review of Systems    Compared to one year ago, the patient feels his physical health is the same.  Compared to one year ago, the patient feels his mental health is the same.    Reviewed chart for potential of high risk medication in the elderly: yes  Reviewed chart for potential of harmful drug interactions in the elderly:yes    Objective         Vitals:    12/18/20 0902   BP: 128/74   Pulse: 84   Temp: 97.1 °F (36.2 °C)   SpO2: 98%   Weight: 134 kg (295 lb)   Height: 182.9 cm (72.01\")       Body mass index is 40 kg/m².  Discussed the patient's BMI with him. The BMI is above average; BMI management plan is completed.    Physical Exam    Lab Results   Component Value Date     (H) 10/07/2020    CHLPL 152 10/07/2020    TRIG 185 (H) 10/07/2020    HDL 37 (L) 10/07/2020    LDL 78 10/07/2020    VLDL 37 10/07/2020        Assessment/Plan   Medicare Risks and Personalized Health Plan  CMS Preventative Services Quick Reference  Advance Directive Discussion  Cardiovascular risk  Immunizations Discussed/Encouraged (specific immunizations; Pneumococcal 23, Prevnar and Shingrix " )  Inactivity/Sedentary  Obesity/Overweight     The above risks/problems have been discussed with the patient.  Pertinent information has been shared with the patient in the After Visit Summary.  Follow up plans and orders are seen below in the Assessment/Plan Section.    There are no diagnoses linked to this encounter.  Follow Up:  Return in about 6 months (around 6/18/2021) for Recheck.     An After Visit Summary and PPPS were given to the patient.

## 2020-12-29 ENCOUNTER — TELEPHONE (OUTPATIENT)
Dept: FAMILY MEDICINE CLINIC | Facility: CLINIC | Age: 65
End: 2020-12-29

## 2020-12-29 ENCOUNTER — TELEPHONE (OUTPATIENT)
Dept: ORTHOPEDIC SURGERY | Facility: CLINIC | Age: 65
End: 2020-12-29

## 2020-12-29 NOTE — TELEPHONE ENCOUNTER
Spoke to patient advised him that there is only one provider in the office and no appointments available for him to go to a full service urgent care to be evaluated he said he was going to call Dr. Epperson the ortho he has saw in the past for his hand

## 2020-12-29 NOTE — TELEPHONE ENCOUNTER
Caller: Hoang So    Relationship to patient: Self    Best call back number: [phone]    PATIENT CALLED TO SCHEDULE SAME DAY APPOINTMENT WITH [facility]ELTON, ADVISED PATIENT SHE HAS NOTHING OPEN TODAY.     THE PATIENT SAID Sunday HIS RIGHT HAND STARTED HURTING TO THE POINT OF NOT BEING ABLE TO SLEEP, HE SAID HE CAN'T OPEN IT ANY LONGER AND ALSO THE OTHER HAND IS HURTING AS WELL, BUT NOT AS BAD.     THE PATIENT IS WANTING TO SEE IF THERE IS SOMETHING HE CAN TAKE, OR IF HE CAN BE WORKED IN FOR AN APPOINTMENT.    THE PATIENT WOULD LIKE A CALLBACK

## 2021-01-01 NOTE — TELEPHONE ENCOUNTER
I spoke to Mr. So.  I recommended he try Tylenol OTC for his hand pain.  The risks of this medication were discussed.  Additionally, he can use ice or heat as well.  He will try Tylenol.  He has used ice and says the pain has actually subsided somewhat and feels he will be okay until he sees Dr. Epperson on the 13th.

## 2021-01-13 ENCOUNTER — OFFICE VISIT (OUTPATIENT)
Dept: ORTHOPEDIC SURGERY | Facility: CLINIC | Age: 66
End: 2021-01-13

## 2021-01-13 VITALS — BODY MASS INDEX: 38.6 KG/M2 | HEIGHT: 72 IN | TEMPERATURE: 97.2 F | WEIGHT: 285 LBS

## 2021-01-13 DIAGNOSIS — G56.03 BILATERAL CARPAL TUNNEL SYNDROME: ICD-10-CM

## 2021-01-13 DIAGNOSIS — M79.641 RIGHT HAND PAIN: ICD-10-CM

## 2021-01-13 DIAGNOSIS — M79.642 LEFT HAND PAIN: Primary | ICD-10-CM

## 2021-01-13 PROCEDURE — 73130 X-RAY EXAM OF HAND: CPT | Performed by: ORTHOPAEDIC SURGERY

## 2021-01-13 PROCEDURE — 99214 OFFICE O/P EST MOD 30 MIN: CPT | Performed by: ORTHOPAEDIC SURGERY

## 2021-01-13 RX ORDER — MELOXICAM 15 MG/1
15 TABLET ORAL DAILY PRN
Qty: 30 TABLET | Refills: 2 | Status: SHIPPED | OUTPATIENT
Start: 2021-01-13 | End: 2021-01-13

## 2021-01-13 RX ORDER — METHYLPREDNISOLONE 4 MG/1
TABLET ORAL
Qty: 21 TABLET | Refills: 0 | Status: SHIPPED | OUTPATIENT
Start: 2021-01-13 | End: 2021-03-10

## 2021-01-13 RX ORDER — TRAMADOL HYDROCHLORIDE 50 MG/1
50 TABLET ORAL EVERY 4 HOURS PRN
Qty: 60 TABLET | Refills: 0 | Status: SHIPPED | OUTPATIENT
Start: 2021-01-13 | End: 2021-11-11
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
## 2021-02-08 ENCOUNTER — OFFICE VISIT (OUTPATIENT)
Dept: ORTHOPEDIC SURGERY | Facility: CLINIC | Age: 66
End: 2021-02-08

## 2021-02-08 VITALS — WEIGHT: 285 LBS | TEMPERATURE: 97.2 F | BODY MASS INDEX: 38.6 KG/M2 | HEIGHT: 72 IN

## 2021-02-08 DIAGNOSIS — G56.03 BILATERAL CARPAL TUNNEL SYNDROME: Primary | ICD-10-CM

## 2021-02-08 PROCEDURE — 99212 OFFICE O/P EST SF 10 MIN: CPT | Performed by: ORTHOPAEDIC SURGERY

## 2021-02-08 PROCEDURE — 20526 THER INJECTION CARP TUNNEL: CPT | Performed by: ORTHOPAEDIC SURGERY

## 2021-02-08 RX ORDER — METHYLPREDNISOLONE ACETATE 80 MG/ML
80 INJECTION, SUSPENSION INTRA-ARTICULAR; INTRALESIONAL; INTRAMUSCULAR; SOFT TISSUE
Status: COMPLETED | OUTPATIENT
Start: 2021-02-08 | End: 2021-02-08

## 2021-02-08 RX ORDER — MELOXICAM 15 MG/1
15 TABLET ORAL DAILY
COMMUNITY
Start: 2021-01-13 | End: 2021-11-11

## 2021-02-08 RX ADMIN — METHYLPREDNISOLONE ACETATE 80 MG: 80 INJECTION, SUSPENSION INTRA-ARTICULAR; INTRALESIONAL; INTRAMUSCULAR; SOFT TISSUE at 15:53

## 2021-02-08 NOTE — PROGRESS NOTES
Chief complaint: Persistent bilateral hand pain, numbness and tingling.    Patient follows up today for both hands.  The steroids did help but his symptoms have recurred.  He wears the braces at night and they help as well but his symptoms have persisted.  He is frustrated.    On exam, no significant changes relative to previous.  Skin is benign.  No atrophy.  Positive Tinel's over the carpal tunnel bilaterally.    Assessment: Bilateral carpal tunnel syndrome    Plan: We discussed options.  He would like to try the injections.  The risk, benefits and alternatives were discussed but he consented and the injections were performed as described below.  He will follow-up as needed.    Medium Joint Arthrocentesis: R intercarpal  Date/Time: 2/8/2021 3:53 PM  Consent given by: patient  Site marked: site marked  Timeout: Immediately prior to procedure a time out was called to verify the correct patient, procedure, equipment, support staff and site/side marked as required   Supporting Documentation  Indications: pain   Procedure Details  Location: wrist - R intercarpal  Preparation: Patient was prepped and draped in the usual sterile fashion  Needle size: 25 G  Approach: volar  Medications administered: 80 mg methylPREDNISolone acetate 80 MG/ML; 2 mL lidocaine (cardiac)  Patient tolerance: patient tolerated the procedure well with no immediate complications    Medium Joint Arthrocentesis: L intercarpal  Date/Time: 2/8/2021 3:53 PM  Consent given by: patient  Site marked: site marked  Timeout: Immediately prior to procedure a time out was called to verify the correct patient, procedure, equipment, support staff and site/side marked as required   Supporting Documentation  Indications: pain   Procedure Details  Location: wrist - L intercarpal  Preparation: Patient was prepped and draped in the usual sterile fashion  Needle size: 25 G  Approach: volar  Medications administered: 2 mL lidocaine (cardiac); 80 mg methylPREDNISolone  acetate 80 MG/ML  Patient tolerance: patient tolerated the procedure well with no immediate complications           Normal rate, regular rhythm, normal S1, S2 heart sounds heard.

## 2021-02-09 RX ORDER — METHYLPREDNISOLONE 4 MG/1
TABLET ORAL
Qty: 21 TABLET | Refills: 0 | Status: SHIPPED | OUTPATIENT
Start: 2021-02-09 | End: 2021-03-10

## 2021-02-09 NOTE — TELEPHONE ENCOUNTER
Yes.  Flareup reactions to the steroids are common.  Please call in a Medrol Dosepak for him.  Please let him know that the symptoms should subside over a day or so.

## 2021-02-09 NOTE — TELEPHONE ENCOUNTER
Patient stopped by the office today and stated he had gotten a shot in both wrist yesterday. His right is beyond sore. A little swollen and hard to close no issues with the left hand can you please advise is this is normal after normal after injections to the wrist.

## 2021-03-10 ENCOUNTER — OFFICE VISIT (OUTPATIENT)
Dept: CARDIOLOGY | Facility: CLINIC | Age: 66
End: 2021-03-10

## 2021-03-10 ENCOUNTER — TELEPHONE (OUTPATIENT)
Dept: ORTHOPEDIC SURGERY | Facility: CLINIC | Age: 66
End: 2021-03-10

## 2021-03-10 VITALS
WEIGHT: 289 LBS | SYSTOLIC BLOOD PRESSURE: 138 MMHG | HEIGHT: 72 IN | BODY MASS INDEX: 39.14 KG/M2 | HEART RATE: 71 BPM | DIASTOLIC BLOOD PRESSURE: 96 MMHG

## 2021-03-10 DIAGNOSIS — M54.41 ACUTE BILATERAL LOW BACK PAIN WITH BILATERAL SCIATICA: ICD-10-CM

## 2021-03-10 DIAGNOSIS — G47.33 OSA (OBSTRUCTIVE SLEEP APNEA): ICD-10-CM

## 2021-03-10 DIAGNOSIS — I10 ESSENTIAL HYPERTENSION: ICD-10-CM

## 2021-03-10 DIAGNOSIS — M54.42 ACUTE BILATERAL LOW BACK PAIN WITH BILATERAL SCIATICA: ICD-10-CM

## 2021-03-10 DIAGNOSIS — I48.0 PAF (PAROXYSMAL ATRIAL FIBRILLATION) (HCC): ICD-10-CM

## 2021-03-10 DIAGNOSIS — I48.0 PAROXYSMAL ATRIAL FIBRILLATION (HCC): Primary | ICD-10-CM

## 2021-03-10 PROCEDURE — 99214 OFFICE O/P EST MOD 30 MIN: CPT | Performed by: INTERNAL MEDICINE

## 2021-03-10 PROCEDURE — 93000 ELECTROCARDIOGRAM COMPLETE: CPT | Performed by: INTERNAL MEDICINE

## 2021-03-10 RX ORDER — CYCLOBENZAPRINE HCL 10 MG
10 TABLET ORAL 3 TIMES DAILY PRN
Qty: 60 TABLET | Refills: 0 | Status: SHIPPED | OUTPATIENT
Start: 2021-03-10 | End: 2021-11-11
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
## 2021-03-10 NOTE — TELEPHONE ENCOUNTER
The hub is asking for a work in on this patient. Apparently he is in extreme pain with his back and needs to be seen and he was referred over to us.  Can we work in or next available, he see's Dr Epperson for trigger finger.please advise

## 2021-03-10 NOTE — TELEPHONE ENCOUNTER
No I cannot work him into a already full schedule.  Has he seen his primary care md?  Another option is urgent care or another back specialist if he feels he cannot wait.

## 2021-03-10 NOTE — TELEPHONE ENCOUNTER
Caller: Hoang So    Relationship: Self    Best call back number: [phone]    What medication are you requesting: MUSCLE RELAXER     What are your current symptoms: SEVERE BACK PAIN, ORIGINATED ON THE LEFT SIDE BUT BEGINNING TO RADIATE ALL OVER. THE PAIN RUNS THROUGH HIS LEGS AND MAKES IT DIFFICULT TO WALK.     How long have you been experiencing symptoms: CLOSE TO A WK NOW.     Have you had these symptoms before:    [x] Yes  [] No  AROUND 15 YEARS AGO PATIENT HAD A BULGING DISC IN HIS BACK AND THE DR. AT THAT TIME PRESCRIBED HIM MUSCLE RELAXER AND TOLD HIM TO REST, REST AND REST.  Have you been treated for these symptoms before:   [x] Yes  [] No    If a prescription is needed, what is your preferred pharmacy and phone number: [facility] AT [facility] [phone] [facility] [phone]      Additional notes: ABOUT A WEEK AGO PATIENT BEGAN EXPERIENCING SEVERE LEFT SIDED BACK PAIN THAT SHOT THROUGH HIS LEGS AND MADE IT DIFFICULT TO WALK.  IT HAS SINCE RADIATED OVER HIS WHOLE BACK AND STILL IN HIS LEFT. PATIENT IS JUST MISERABLE AND CAN BARELY GET OUT OF BED OR FUNCTION. HE DID HAVE AN APPT WITH HIS CARDIOLOGIST TODAY WHO HE ASKED TO PRESCRIBE HIM A MUSCLE RELAXER, HOWEVER THAT DR INFORMED HIM HE COULDN'T BUT ENCOURAGED PATIENT TO CALL [facility] HIS PCP AND INFORM HER WHAT IS GOING ON AND REQUEST FOR HER TO PRESCRIBE HIM A MUSCLE RELAXER. PATIENTS CARDIOLOGIST ALSO SENT A REFERRAL TO A BACK DR WITH [facility] AND REQUESTED AN URGENT APPT FOR PATIENT.             Franck De Jesus is an 55 year old male.  Patient presents with congestion and not feeling well for over a week.  Has been using over the counter medications plus his home medications without much relief.  States that he can feel the pressure build up in his face and in his ears.  Denies any chest pain or shortness of breath.     Past Medical History:   Diagnosis Date   • Gastroesophageal reflux disease 8/8/2017   • Lipoma     left flank excision 2012   • Moderate persistent asthma without complication 8/8/2017   • Pneumonia      Past Surgical History:   Procedure Laterality Date   • Colonoscopy  08/08/2011   • Hernia repair       Family History   Problem Relation Age of Onset   • Coronary Artery Disease Father 57     Social History     Tobacco Use   • Smoking status: Never Smoker   • Smokeless tobacco: Former User   Substance Use Topics   • Alcohol use: No       Prior to Admission Meds:(Not in a hospital admission)    Scheduled Meds:  Continuous Infusions:  PRN Meds:    Allergies: ALLERGIES:  No Known Allergies    Active Problems:    * No active hospital problems. *    Blood pressure 140/90, pulse 89, temperature 98.3 °F (36.8 °C), temperature source Oral, resp. rate 16, height 5' 7\" (1.702 m), weight 78 kg, SpO2 95 %.    Review of Systems   Constitutional: Positive for fatigue and fever.   HENT: Positive for congestion, sinus pressure and sinus pain.    Respiratory: Negative.    Cardiovascular: Negative.         Physical Exam   Constitutional: He is oriented to person, place, and time. He appears well-developed and well-nourished.   HENT:   Head: Normocephalic.   Right Ear: Hearing, external ear and ear canal normal. Tympanic membrane is bulging.   Left Ear: Hearing, external ear and ear canal normal. Tympanic membrane is bulging.   Nose: Right sinus exhibits maxillary sinus tenderness. Right sinus exhibits no frontal sinus tenderness. Left sinus exhibits maxillary sinus tenderness. Left sinus exhibits no frontal  sinus tenderness.   Mouth/Throat: Uvula is midline, oropharynx is clear and moist and mucous membranes are normal.   Neck: Normal range of motion.   + lymph nodes   Cardiovascular: Normal rate, regular rhythm and normal heart sounds.   Pulmonary/Chest: Effort normal and breath sounds normal. Musculoskeletal: Normal range of motion.     Neurological: He is alert and oriented to person, place, and time.   Skin: Skin is warm and dry.   Psychiatric: He has a normal mood and affect. His behavior is normal. Judgment and thought content normal.   Nursing note and vitals reviewed.      Assessment:  I have reviewed the past medial history, family history, social history, medications and allergies listed in the medical record as obtained by nursing staff and support staff and agree with their documentation    MDM    The patient is clinically stable and non toxic  The most likely ddx is: J01.00 Acute non-recurrent maxillary sinusitis  (primary encounter diagnosis)    As supported by diagnostic testing today and procedural interventions as appropriate.  The low likelihood of other less likely ddx is insufficient to justify any further testing for them.  Risk outweighs the benefit.  This was explained to the patient.  The patient was advised that persistent or worsening of symptoms would require further evaluation.    During the visit the patient remained: stable    OTC/Supportive     Drink plenty of fluids to help you stay hydrated: water, juice, Gatorade, broth, etc.   Rest  Continue to take your medications  Can buy over the counter flonase to help with congestion  Complete the antibiotics: augmentin  Can use Tylenol or ibuprofen as needed for fever or pain  Follow up with primary care provider if symptoms do not improve or get worse    MISHA Trujillo  8/26/2019     Significant portion of visit spent in discussion, patient education, including the likelihood of etiology of sn/sx, counseling about reasonable and safe  management options, including potential rx, r/b/se, and coordination of care.  Shared decision making practiced.  All questions addressed and answered.  D/W pa the sn/sx of worsening conditions which would require ED evaluation.  Portions of chart may have utilized medical abbreviations and/or electronic dictation.  Patient verbalizes understanding of and agreement of plan as documented.

## 2021-04-09 ENCOUNTER — TELEPHONE (OUTPATIENT)
Dept: CARDIOLOGY | Facility: CLINIC | Age: 66
End: 2021-04-09

## 2021-04-09 NOTE — TELEPHONE ENCOUNTER
Pt is scheduled to have a spine surgery with Dr. Bass at [facility] 4-23. They are asking when he should hold his rivaroxaban prior to procedure. Pt has NO history of stroke or valve replacement...Alessandra

## 2021-07-06 ENCOUNTER — TELEPHONE (OUTPATIENT)
Dept: PHARMACY | Facility: HOSPITAL | Age: 66
End: 2021-07-06

## 2021-07-06 NOTE — TELEPHONE ENCOUNTER
Medication Adherence Call    Patient was called today to discuss medication adherence with rosuvastatin, as the he was identified as having care opportunities.     He states that he has only been taking 1/2 tablet for some time now. He can't remember who told him to change to this. The prescription is currently written with directions to take a whole tablet daily. If he is taking correctly now, can the prescription be updated?     Please let me know if you have any questions.    Gertrudis Bailey, PharmD  Population Health Pharmacist  07/06/21

## 2021-10-07 DIAGNOSIS — I48.0 PAROXYSMAL ATRIAL FIBRILLATION (HCC): ICD-10-CM

## 2021-10-07 RX ORDER — PROPAFENONE HYDROCHLORIDE 225 MG/1
TABLET, FILM COATED ORAL
Qty: 270 TABLET | Refills: 0 | Status: SHIPPED | OUTPATIENT
Start: 2021-10-07 | End: 2022-01-03

## 2021-10-28 DIAGNOSIS — I48.0 PAROXYSMAL ATRIAL FIBRILLATION (HCC): ICD-10-CM

## 2021-10-28 RX ORDER — RIVAROXABAN 20 MG/1
TABLET, FILM COATED ORAL
Qty: 90 TABLET | Refills: 3 | Status: SHIPPED | OUTPATIENT
Start: 2021-10-28 | End: 2022-10-20

## 2021-11-11 ENCOUNTER — OFFICE VISIT (OUTPATIENT)
Dept: CARDIOLOGY | Facility: CLINIC | Age: 66
End: 2021-11-11

## 2021-11-11 VITALS
HEIGHT: 72 IN | BODY MASS INDEX: 39.82 KG/M2 | OXYGEN SATURATION: 96 % | HEART RATE: 67 BPM | SYSTOLIC BLOOD PRESSURE: 120 MMHG | WEIGHT: 294 LBS | DIASTOLIC BLOOD PRESSURE: 82 MMHG

## 2021-11-11 DIAGNOSIS — I48.0 PAF (PAROXYSMAL ATRIAL FIBRILLATION) (HCC): Primary | ICD-10-CM

## 2021-11-11 DIAGNOSIS — E66.9 OBESITY (BMI 30-39.9): ICD-10-CM

## 2021-11-11 DIAGNOSIS — I10 BENIGN ESSENTIAL HYPERTENSION: ICD-10-CM

## 2021-11-11 DIAGNOSIS — E78.49 OTHER HYPERLIPIDEMIA: ICD-10-CM

## 2021-11-11 PROBLEM — I48.91 ATRIAL FIBRILLATION: Status: RESOLVED | Noted: 2017-10-13 | Resolved: 2021-11-11

## 2021-11-11 PROCEDURE — 99213 OFFICE O/P EST LOW 20 MIN: CPT | Performed by: INTERNAL MEDICINE

## 2021-11-11 PROCEDURE — 93000 ELECTROCARDIOGRAM COMPLETE: CPT | Performed by: INTERNAL MEDICINE

## 2021-11-11 NOTE — PROGRESS NOTES
"      CARDIOLOGY    Niki Aguillon MD    ENCOUNTER DATE:  11/11/2021    Hoang So / 66 y.o. / male        CHIEF COMPLAINT / REASON FOR OFFICE VISIT     PAF (1 Year follow up)      HISTORY OF PRESENT ILLNESS       HPI    Hoang So is a 66 y.o. male     This is a nice gentleman who presented to the cardiac evaluation clinic in 10/2017.  He complained of dizziness and shortness of breath.  He was found to be having paroxysms of atrial fibrillation.  He was given IV Lasix and IV diltiazem as well as IV metoprolol.  He had an echocardiogram which revealed asymmetric left ventricular hypertrophy, normal LV systolic function, and no significant valvular heart disease.  He was discharged on oral diltiazem.  He had recurrent, symptomatic atrial fibrillation and was started on propafenone and Xarelto.  He had a treadmill stress test in December 2017 which did not show any evidence of ischemia.     He follows with Dr. Lee. He is on propafenone.    He is feeling well.  No chest pain, shortness of breath or palpitations.  He plays golf and does some walking for exercise.    REVIEW OF SYSTEMS     Review of Systems   Constitutional: Negative for chills, fever, weight gain and weight loss.   Cardiovascular: Negative for leg swelling.   Respiratory: Negative for cough, snoring and wheezing.    Hematologic/Lymphatic: Negative for bleeding problem. Does not bruise/bleed easily.   Skin: Negative for color change.   Musculoskeletal: Negative for falls, joint pain and myalgias.   Gastrointestinal: Negative for melena.   Genitourinary: Negative for hematuria.   Neurological: Negative for excessive daytime sleepiness.   Psychiatric/Behavioral: Negative for depression. The patient is not nervous/anxious.          VITAL SIGNS     Visit Vitals  /82 (BP Location: Left arm)   Pulse 67   Ht 182.9 cm (72\")   Wt 133 kg (294 lb)   SpO2 96%   BMI 39.87 kg/m²         Wt Readings from Last 3 Encounters:   11/11/21 133 kg (294 lb) "   03/10/21 131 kg (289 lb)   02/08/21 129 kg (285 lb)     Body mass index is 39.87 kg/m².      PHYSICAL EXAMINATION     Constitutional:       General: Not in acute distress.  Neck:      Vascular: No carotid bruit or JVD.   Pulmonary:      Effort: Pulmonary effort is normal.      Breath sounds: Normal breath sounds.   Cardiovascular:      Normal rate. Regular rhythm.      Murmurs: There is no murmur.   Psychiatric:         Mood and Affect: Mood and affect normal.           REVIEWED DATA       ECG 12 Lead    Date/Time: 11/11/2021 10:49 AM  Performed by: Niki Aguillon MD  Authorized by: Niki Aguillon MD   Comparison: compared with previous ECG   Similar to previous ECG  Rhythm: sinus rhythm  BPM: 61  Conduction: conduction normal  ST Segments: ST segments normal  T Waves: T waves normal    Clinical impression: normal ECG                  Lab Results   Component Value Date    GLUCOSE 125 (H) 10/07/2020    BUN 15 10/07/2020    CREATININE 1.18 10/07/2020    EGFRIFNONA 62 10/07/2020    EGFRIFAFRI 75 10/07/2020    BCR 12.7 10/07/2020    K 4.4 10/07/2020    CO2 26.4 10/07/2020    CALCIUM 9.3 10/07/2020    PROTENTOTREF 7.0 10/07/2020    ALBUMIN 4.60 10/07/2020    LABIL2 1.9 10/07/2020    AST 17 10/07/2020    ALT 24 10/07/2020       ASSESSMENT & PLAN      Diagnosis Plan   1. PAF (paroxysmal atrial fibrillation) (HCC)     2. Other hyperlipidemia     3. Benign essential hypertension     4. Obesity (BMI 30-39.9)         1.  Persistent atrial fibrillation.  Chads 2 Vascor of 2.  Continue rivaroxaban, propafenone and metoprolol tartrate.  He also follows up with Dr. Lee and Rajani.   2.  Hypertension.  Blood pressure is well controlled.  3.  Hyperlipidemia.  On rosuvastatin.  I reviewed his most recent lipid panel.  Looks like he is about due for another one.  He says he is going to go see Kashmir Pardo soon.  4.  Obstructive sleep apnea.  On CPAP  5.  Obesity    Follow-up with me in a year.    Orders Placed This  Encounter   Procedures   • ECG 12 Lead     This order was created via procedure documentation     Order Specific Question:   Release to patient     Answer:   Immediate           MEDICATIONS         Discharge Medications          Accurate as of November 11, 2021 10:51 AM. If you have any questions, ask your nurse or doctor.            Changes to Medications      Instructions Start Date   rosuvastatin 10 MG tablet  Commonly known as: CRESTOR  What changed:   · how much to take  · how to take this  · when to take this   TAKE ONE TABLET BY MOUTH EVERY NIGHT AT BEDTIME         Continue These Medications      Instructions Start Date   acetaminophen 500 MG tablet  Commonly known as: TYLENOL   1,000 mg, Oral, Every 6 Hours PRN      diphenhydrAMINE 25 mg capsule  Commonly known as: BENADRYL   25 mg, Oral, Every 6 Hours PRN      metoprolol tartrate 25 MG tablet  Commonly known as: LOPRESSOR   12.5 mg, Oral, Every 12 Hours Scheduled      MiraLax 17 g packet  Generic drug: polyethylene glycol   17 g, Oral, Daily      propafenone 225 MG tablet  Commonly known as: RYTHMOL   TAKE ONE TABLET BY MOUTH EVERY 8 HOURS      Xarelto 20 MG tablet  Generic drug: rivaroxaban   TAKE ONE TABLET BY MOUTH DAILY         Stop These Medications    cyclobenzaprine 10 MG tablet  Commonly known as: FLEXERIL  Stopped by: Niki Aguillon MD     meloxicam 15 MG tablet  Commonly known as: MOBIC  Stopped by: Niki Aguillon MD     traMADol 50 MG tablet  Commonly known as: ULTRAM  Stopped by: MD Niki Peters MD  11/11/21  10:51 EST    **Dragon Disclaimer:   Much of this encounter note is an electronic transcription/translation of spoken language to printed text. The electronic translation of spoken language may permit erroneous, or at times, nonsensical words or phrases to be inadvertently transcribed. Although I have reviewed the note for such errors, some may still exist.

## 2021-12-19 DIAGNOSIS — I10 HYPERTENSION, UNSPECIFIED TYPE: ICD-10-CM

## 2021-12-19 DIAGNOSIS — E78.2 MIXED HYPERLIPIDEMIA: ICD-10-CM

## 2021-12-20 RX ORDER — ROSUVASTATIN CALCIUM 10 MG/1
TABLET, COATED ORAL
Qty: 30 TABLET | Refills: 10 | Status: SHIPPED | OUTPATIENT
Start: 2021-12-20 | End: 2022-01-06 | Stop reason: SDUPTHER

## 2022-01-03 DIAGNOSIS — I48.0 PAROXYSMAL ATRIAL FIBRILLATION: ICD-10-CM

## 2022-01-03 RX ORDER — PROPAFENONE HYDROCHLORIDE 225 MG/1
TABLET, FILM COATED ORAL
Qty: 270 TABLET | Refills: 0 | Status: SHIPPED | OUTPATIENT
Start: 2022-01-03 | End: 2022-04-04

## 2022-01-03 NOTE — TELEPHONE ENCOUNTER
Rx Refill Note  Requested Prescriptions     Pending Prescriptions Disp Refills   • propafenone (RYTHMOL) 225 MG tablet [Pharmacy Med Name: PROPAFENONE  MG TAB] 270 tablet 0     Sig: TAKE ONE TABLET BY MOUTH EVERY 8 HOURS      Last office visit with prescribing clinician: 12/18/2020      Next office visit with prescribing clinician: 1/6/2022            Román Chance MA  01/03/22, 09:17 EST

## 2022-01-06 ENCOUNTER — OFFICE VISIT (OUTPATIENT)
Dept: FAMILY MEDICINE CLINIC | Facility: CLINIC | Age: 67
End: 2022-01-06

## 2022-01-06 VITALS
WEIGHT: 296.6 LBS | TEMPERATURE: 96.6 F | BODY MASS INDEX: 40.17 KG/M2 | DIASTOLIC BLOOD PRESSURE: 72 MMHG | SYSTOLIC BLOOD PRESSURE: 104 MMHG | OXYGEN SATURATION: 98 % | HEART RATE: 74 BPM | HEIGHT: 72 IN

## 2022-01-06 DIAGNOSIS — Z23 FLU VACCINE NEED: Primary | ICD-10-CM

## 2022-01-06 DIAGNOSIS — E78.49 OTHER HYPERLIPIDEMIA: ICD-10-CM

## 2022-01-06 DIAGNOSIS — I10 BENIGN ESSENTIAL HYPERTENSION: ICD-10-CM

## 2022-01-06 DIAGNOSIS — Z13.0 SCREENING FOR IRON DEFICIENCY ANEMIA: ICD-10-CM

## 2022-01-06 DIAGNOSIS — I48.0 PAROXYSMAL ATRIAL FIBRILLATION: ICD-10-CM

## 2022-01-06 DIAGNOSIS — G56.01 CARPAL TUNNEL SYNDROME OF RIGHT WRIST: ICD-10-CM

## 2022-01-06 DIAGNOSIS — Z12.5 SPECIAL SCREENING FOR MALIGNANT NEOPLASM OF PROSTATE: ICD-10-CM

## 2022-01-06 DIAGNOSIS — Z13.1 SCREENING FOR DIABETES MELLITUS: ICD-10-CM

## 2022-01-06 DIAGNOSIS — I10 HYPERTENSION, UNSPECIFIED TYPE: ICD-10-CM

## 2022-01-06 DIAGNOSIS — E78.2 MIXED HYPERLIPIDEMIA: ICD-10-CM

## 2022-01-06 DIAGNOSIS — I48.0 PAF (PAROXYSMAL ATRIAL FIBRILLATION): ICD-10-CM

## 2022-01-06 DIAGNOSIS — Z23 ENCOUNTER FOR IMMUNIZATION: ICD-10-CM

## 2022-01-06 PROCEDURE — 99214 OFFICE O/P EST MOD 30 MIN: CPT | Performed by: NURSE PRACTITIONER

## 2022-01-06 PROCEDURE — 90732 PPSV23 VACC 2 YRS+ SUBQ/IM: CPT | Performed by: NURSE PRACTITIONER

## 2022-01-06 PROCEDURE — G0009 ADMIN PNEUMOCOCCAL VACCINE: HCPCS | Performed by: NURSE PRACTITIONER

## 2022-01-06 PROCEDURE — G0008 ADMIN INFLUENZA VIRUS VAC: HCPCS | Performed by: NURSE PRACTITIONER

## 2022-01-06 PROCEDURE — 90662 IIV NO PRSV INCREASED AG IM: CPT | Performed by: NURSE PRACTITIONER

## 2022-01-06 RX ORDER — ROSUVASTATIN CALCIUM 10 MG/1
10 TABLET, COATED ORAL
Qty: 90 TABLET | Refills: 1 | Status: SHIPPED | OUTPATIENT
Start: 2022-01-06 | End: 2022-12-01 | Stop reason: SDUPTHER

## 2022-01-06 RX ORDER — METHYLPREDNISOLONE 4 MG/1
TABLET ORAL
Qty: 1 EACH | Refills: 0 | Status: SHIPPED | OUTPATIENT
Start: 2022-01-06 | End: 2022-03-16

## 2022-01-06 NOTE — PROGRESS NOTES
"Chief Complaint  Hypertension (MED REFILL )    Subjective          Hoang So presents to [facility]  History of Present Illness  Tunnel-patient has seen a specialist for this and has gotten injections.  The left one is completely resolved but the right 1 occasionally causes some issues he does use a brace at night he is requesting a Medrol Dosepak to help when it flares up which works quite well.    A. fib/hypertension-currently seeing cardiologist and Dr. Foster who is following this.  On Xarelto 20 mg a day and metoprolol 25 mg twice a day and Rythmol 225 mg 3 times a day    Hyperlipidemia-currently on Crestor 10 mg daily as directed without any side effects.  Cholesterol was last checked in October 2020 with a total cholesterol 152 and an LDL of 78.      Objective   Vital Signs:   /72   Pulse 74   Temp 96.6 °F (35.9 °C)   Ht 182.9 cm (72\")   Wt 135 kg (296 lb 9.6 oz)   SpO2 98%   BMI 40.23 kg/m²     Physical Exam  Vitals reviewed.   Constitutional:       General: He is not in acute distress.     Appearance: He is well-developed.   HENT:      Head: Normocephalic.   Cardiovascular:      Rate and Rhythm: Normal rate and regular rhythm.      Heart sounds: Normal heart sounds.   Pulmonary:      Effort: Pulmonary effort is normal.      Breath sounds: Normal breath sounds.   Neurological:      Mental Status: He is alert and oriented to person, place, and time.      Gait: Gait normal.   Psychiatric:         Behavior: Behavior normal.         Thought Content: Thought content normal.         Judgment: Judgment normal.        Result Review :   The following data was reviewed by: ASAEL Mai on 01/06/2022:      CBC w/diff    CBC w/Diff 4/8/21   WBC 6.97   RBC 5.34   Hemoglobin 15.9   Hematocrit 49.1   MCV 91.9   MCH 29.8   MCHC 32.4   RDW 13.9   Platelets 209   Neutrophil Rel % 56.5   Immature Granulocyte Rel % 0.7   Lymphocyte Rel % 30.4   Monocyte Rel % 9.5   Eosinophil " Rel % 1.9   Basophil Rel % 1.0                                     Assessment and Plan    Diagnoses and all orders for this visit:    1. Flu vaccine need (Primary)  -     Fluzone High-Dose 65+yrs ([id number])    2. Benign essential hypertension    3. Other hyperlipidemia  -     Comprehensive Metabolic Panel  -     Lipid Panel With LDL / HDL Ratio    4. PAF (paroxysmal atrial fibrillation) (HCC)    5. Screening for iron deficiency anemia  -     CBC & Differential    6. Screening for diabetes mellitus  -     Comprehensive Metabolic Panel    7. Special screening for malignant neoplasm of prostate  -     PSA Screen    8. Carpal tunnel syndrome of right wrist  -     methylPREDNISolone (MEDROL) 4 MG dose pack; Take as directed on package instructions.  Dispense: 1 each; Refill: 0    9. Hypertension, unspecified type  -     metoprolol tartrate (LOPRESSOR) 25 MG tablet; Take 0.5 tablets by mouth Every 12 (Twelve) Hours.  Dispense: 90 tablet; Refill: 1    10. Mixed hyperlipidemia  -     rosuvastatin (CRESTOR) 10 MG tablet; Take 1 tablet by mouth every night at bedtime.  Dispense: 90 tablet; Refill: 1    11. Paroxysmal atrial fibrillation (HCC)        Follow Up   Return in about 6 months (around 7/6/2022) for Recheck.  Patient was given instructions and counseling regarding his condition or for health maintenance advice. Please see specific information pulled into the AVS if appropriate.     Continue same with all medications.  Follow-up after labs.  Return to the office in 6 months    Mask and googles worn

## 2022-01-07 LAB
ALBUMIN SERPL-MCNC: 4.2 G/DL (ref 3.8–4.8)
ALBUMIN/GLOB SERPL: 1.6 {RATIO} (ref 1.2–2.2)
ALP SERPL-CCNC: 101 IU/L (ref 44–121)
ALT SERPL-CCNC: 27 IU/L (ref 0–44)
AST SERPL-CCNC: 16 IU/L (ref 0–40)
BASOPHILS # BLD AUTO: 0.1 X10E3/UL (ref 0–0.2)
BASOPHILS NFR BLD AUTO: 1 %
BILIRUB SERPL-MCNC: 0.4 MG/DL (ref 0–1.2)
BUN SERPL-MCNC: 22 MG/DL (ref 8–27)
BUN/CREAT SERPL: 19 (ref 10–24)
CALCIUM SERPL-MCNC: 9.6 MG/DL (ref 8.6–10.2)
CHLORIDE SERPL-SCNC: 103 MMOL/L (ref 96–106)
CHOLEST SERPL-MCNC: 156 MG/DL (ref 100–199)
CO2 SERPL-SCNC: 24 MMOL/L (ref 20–29)
CREAT SERPL-MCNC: 1.14 MG/DL (ref 0.76–1.27)
EOSINOPHIL # BLD AUTO: 0.2 X10E3/UL (ref 0–0.4)
EOSINOPHIL NFR BLD AUTO: 2 %
ERYTHROCYTE [DISTWIDTH] IN BLOOD BY AUTOMATED COUNT: 12.3 % (ref 11.6–15.4)
GLOBULIN SER CALC-MCNC: 2.6 G/DL (ref 1.5–4.5)
GLUCOSE SERPL-MCNC: 94 MG/DL (ref 65–99)
HCT VFR BLD AUTO: 47.8 % (ref 37.5–51)
HDLC SERPL-MCNC: 33 MG/DL
HGB BLD-MCNC: 15.7 G/DL (ref 13–17.7)
IMM GRANULOCYTES # BLD AUTO: 0 X10E3/UL (ref 0–0.1)
IMM GRANULOCYTES NFR BLD AUTO: 1 %
LDLC SERPL CALC-MCNC: 97 MG/DL (ref 0–99)
LDLC/HDLC SERPL: 2.9 RATIO (ref 0–3.6)
LYMPHOCYTES # BLD AUTO: 2.2 X10E3/UL (ref 0.7–3.1)
LYMPHOCYTES NFR BLD AUTO: 29 %
MCH RBC QN AUTO: 29.6 PG (ref 26.6–33)
MCHC RBC AUTO-ENTMCNC: 32.8 G/DL (ref 31.5–35.7)
MCV RBC AUTO: 90 FL (ref 79–97)
MONOCYTES # BLD AUTO: 0.7 X10E3/UL (ref 0.1–0.9)
MONOCYTES NFR BLD AUTO: 9 %
NEUTROPHILS # BLD AUTO: 4.5 X10E3/UL (ref 1.4–7)
NEUTROPHILS NFR BLD AUTO: 58 %
PLATELET # BLD AUTO: 264 X10E3/UL (ref 150–450)
POTASSIUM SERPL-SCNC: 4.2 MMOL/L (ref 3.5–5.2)
PROT SERPL-MCNC: 6.8 G/DL (ref 6–8.5)
PSA SERPL-MCNC: 1.4 NG/ML (ref 0–4)
RBC # BLD AUTO: 5.31 X10E6/UL (ref 4.14–5.8)
SODIUM SERPL-SCNC: 139 MMOL/L (ref 134–144)
TRIGL SERPL-MCNC: 146 MG/DL (ref 0–149)
VLDLC SERPL CALC-MCNC: 26 MG/DL (ref 5–40)
WBC # BLD AUTO: 7.7 X10E3/UL (ref 3.4–10.8)

## 2022-02-28 ENCOUNTER — TELEPHONE (OUTPATIENT)
Dept: ORTHOPEDIC SURGERY | Facility: CLINIC | Age: 67
End: 2022-02-28

## 2022-02-28 DIAGNOSIS — G56.03 BILATERAL CARPAL TUNNEL SYNDROME: Primary | ICD-10-CM

## 2022-02-28 NOTE — TELEPHONE ENCOUNTER
Provider: ANGEL  Caller: DONNA ROD  Relationship to Patient:PATIENT  Pharmacy: N/A  Phone Number: [phone]  Reason for Call: PATIENT CALLING BECAUSE PAIN IS KEEPING HIM AWAKE NIGHTS (PALMS OF HANDS)  When was the patient last seen: 2.8.21  When did it start:   Where is it located: BILATERAL PALMS OF HANDS  Characteristics of symptom/severity: 8/10  Timing- Is it constant or intermittent: COMES ON AT NIGHT WHEN PATIENT LIES DOWN AND THEN IS CONSTANT AND KEEPS HIM AWAKE.    What makes it worse: LYING DOWN  What makes it better: GETTING UP AND WALKING AROUND THE HOUSE PAIN CEASES, BUT STARTS UP AGAIN WHEN PATIENT LAYS BACK DOWN

## 2022-02-28 NOTE — TELEPHONE ENCOUNTER
I spoke to Ms. So.  She reports the patient's pain is quite severe and waking him at night.  He has been using the wrist splints, but they do not seem to help.  I recommended nerve studies for further evaluation.  Additionally, I have given him a prescription for a transdermal pain/anti-inflammatory cream through [facility].  The risks and dose instructions for this medication were discussed.  She verbalized understanding and will relay the information to Mr. So.  I will call him with the nerve study results and come up with a plan at that time.

## 2022-03-10 ENCOUNTER — TELEPHONE (OUTPATIENT)
Dept: ORTHOPEDIC SURGERY | Facility: CLINIC | Age: 67
End: 2022-03-10

## 2022-03-10 NOTE — TELEPHONE ENCOUNTER
Patient states he is in pain it is unbearable.  Patient states can't wait till the next appt 3/28/22.  Patient states his EMG is not till June.  Patient would like someone to call him.  He is very upset that he is not better and I told him what the last note stated and he said he needs to come in sooner and can't wait till after the EMG's.

## 2022-03-11 ENCOUNTER — TELEPHONE (OUTPATIENT)
Dept: ORTHOPEDIC SURGERY | Facility: CLINIC | Age: 67
End: 2022-03-11

## 2022-03-11 NOTE — TELEPHONE ENCOUNTER
I spoke with him.  All questions answered.  We thoroughly discussed his options at this point.  He is coming in to see me on the 28th.  We may consider an injection for him at that time.

## 2022-03-16 ENCOUNTER — OFFICE VISIT (OUTPATIENT)
Dept: CARDIOLOGY | Facility: CLINIC | Age: 67
End: 2022-03-16

## 2022-03-16 VITALS
DIASTOLIC BLOOD PRESSURE: 82 MMHG | BODY MASS INDEX: 41.04 KG/M2 | WEIGHT: 303 LBS | HEART RATE: 72 BPM | SYSTOLIC BLOOD PRESSURE: 144 MMHG | HEIGHT: 72 IN

## 2022-03-16 DIAGNOSIS — G47.33 OSA (OBSTRUCTIVE SLEEP APNEA): ICD-10-CM

## 2022-03-16 DIAGNOSIS — E66.01 MORBID OBESITY: ICD-10-CM

## 2022-03-16 DIAGNOSIS — I48.0 PAF (PAROXYSMAL ATRIAL FIBRILLATION): Primary | ICD-10-CM

## 2022-03-16 DIAGNOSIS — I10 BENIGN ESSENTIAL HYPERTENSION: ICD-10-CM

## 2022-03-16 PROCEDURE — 93000 ELECTROCARDIOGRAM COMPLETE: CPT | Performed by: NURSE PRACTITIONER

## 2022-03-16 PROCEDURE — 99213 OFFICE O/P EST LOW 20 MIN: CPT | Performed by: NURSE PRACTITIONER
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
## 2022-03-28 ENCOUNTER — OFFICE VISIT (OUTPATIENT)
Dept: ORTHOPEDIC SURGERY | Facility: CLINIC | Age: 67
End: 2022-03-28

## 2022-03-28 VITALS — TEMPERATURE: 97 F | HEIGHT: 72 IN | BODY MASS INDEX: 39.28 KG/M2 | RESPIRATION RATE: 18 BRPM | WEIGHT: 290 LBS

## 2022-03-28 DIAGNOSIS — M79.641 RIGHT HAND PAIN: ICD-10-CM

## 2022-03-28 DIAGNOSIS — M79.642 LEFT HAND PAIN: Primary | ICD-10-CM

## 2022-03-28 PROCEDURE — 99213 OFFICE O/P EST LOW 20 MIN: CPT | Performed by: ORTHOPAEDIC SURGERY

## 2022-03-28 PROCEDURE — 73130 X-RAY EXAM OF HAND: CPT | Performed by: ORTHOPAEDIC SURGERY

## 2022-03-28 NOTE — PROGRESS NOTES
Chief complaint: Bilateral carpal tunnel syndrome    Mr. So reports that his carpal tunnel has been steadily getting worse.  He reports severe pain in both hands and wrist.  He reports intermittent numbness and tingling but the frequency is increasing.  Denies any weakness.  He had previous injections which helped transiently.  He continues to wear braces every night but they no longer seem to help.  He is scheduled for nerve test in June but he does not think he can wait that long.    Both hands and wrists are examined.  Skin is benign.  No atrophy.  He has positive Tinel's and Phalen's over the carpal tunnel bilaterally.  Normal sensory function in the ulnar nerve distribution.  Completely normal motor function throughout the hands.  Negative Tinel's over the cubital tunnel bilaterally.    Assessment: Bilateral carpal tunnel syndrome with worsening symptomatology.    Plan: We discussed his options.  I am hesitant to recommend a surgery for him without the nerve test.  Clinically, he certainly seems to have carpal tunnel but I cannot rule out any associated pathology.  The nerve test would be helpful in this regard.  He is not sure he can wait.  He does not want to get injections again because he does not want to have to wait to have the surgery.  He also does not think he can make it until June to get the test.  We had a long discussion about the risk, benefits and alternatives to carpal tunnel release.  I told him I would recommend staged unilateral releases rather than bilateral.  He and his wife had a number of questions and those were answered in detail.  I told him I think the surgery will help him but if there is associated pathology, he may continue to have symptoms.  He is going to think about it.  He is not sure how he wants to proceed.  For now, his plan is to wait and get the nerve test.  He will call me if he changes his mind.    Cb Epperson MD    
Yes

## 2022-03-29 ENCOUNTER — TELEPHONE (OUTPATIENT)
Dept: ORTHOPEDIC SURGERY | Facility: CLINIC | Age: 67
End: 2022-03-29

## 2022-03-29 NOTE — TELEPHONE ENCOUNTER
Received call from patient.  He wants to go ahead with bilateral Carpal Tunnel Release.    Please put in case request

## 2022-03-30 ENCOUNTER — PREP FOR SURGERY (OUTPATIENT)
Dept: OTHER | Facility: HOSPITAL | Age: 67
End: 2022-03-30

## 2022-03-30 DIAGNOSIS — G56.03 BILATERAL CARPAL TUNNEL SYNDROME: Primary | ICD-10-CM

## 2022-03-30 RX ORDER — CEFAZOLIN SODIUM 2 G/100ML
2 INJECTION, SOLUTION INTRAVENOUS ONCE
Status: CANCELLED | OUTPATIENT
Start: 2022-04-26 | End: 2022-03-30

## 2022-03-31 PROBLEM — G56.03 BILATERAL CARPAL TUNNEL SYNDROME: Status: ACTIVE | Noted: 2022-03-31

## 2022-04-02 DIAGNOSIS — I48.0 PAROXYSMAL ATRIAL FIBRILLATION: ICD-10-CM

## 2022-04-04 RX ORDER — PROPAFENONE HYDROCHLORIDE 225 MG/1
TABLET, FILM COATED ORAL
Qty: 90 TABLET | Refills: 0 | Status: SHIPPED | OUTPATIENT
Start: 2022-04-04 | End: 2022-05-02 | Stop reason: SDUPTHER

## 2022-04-04 NOTE — TELEPHONE ENCOUNTER
Rx Refill Note  Requested Prescriptions     Pending Prescriptions Disp Refills   • propafenone (RYTHMOL) 225 MG tablet [Pharmacy Med Name: PROPAFENONE  MG TAB] 270 tablet 0     Sig: TAKE ONE TABLET BY MOUTH EVERY 8 HOURS      Last office visit with prescribing clinician: 1/6/2022      Next office visit with prescribing clinician: Visit date not found            Román Chance MA  04/04/22, 16:47 EDT

## 2022-04-14 ENCOUNTER — APPOINTMENT (OUTPATIENT)
Dept: PREADMISSION TESTING | Facility: HOSPITAL | Age: 67
End: 2022-04-14

## 2022-04-23 ENCOUNTER — APPOINTMENT (OUTPATIENT)
Dept: LAB | Facility: HOSPITAL | Age: 67
End: 2022-04-23

## 2022-04-28 ENCOUNTER — TELEPHONE (OUTPATIENT)
Dept: CARDIOLOGY | Facility: CLINIC | Age: 67
End: 2022-04-28

## 2022-04-28 NOTE — TELEPHONE ENCOUNTER
He can have surgery and can hold rivaroxaban 3 days prior to surgery.     I think [facility] have a standard clearance letter that they fill out and have us sign.

## 2022-04-28 NOTE — TELEPHONE ENCOUNTER
I have tried to find your clearance letters and was unable to locate     I did call pt and inform him but could you please fax letter of clearance when you return    Thank You :)

## 2022-04-28 NOTE — TELEPHONE ENCOUNTER
Received a fax from [facility] needing cardiac clearance for pt to have left carpal tunnel release left cubital tunnel release on 5/4/22 under axillary anesthesia    Pt was last seen by Terra VYAS on 3/16/22    I have placed the clearance form in your in box for review and signature

## 2022-05-02 DIAGNOSIS — I48.0 PAROXYSMAL ATRIAL FIBRILLATION: ICD-10-CM

## 2022-05-02 RX ORDER — PROPAFENONE HYDROCHLORIDE 225 MG/1
225 TABLET, FILM COATED ORAL EVERY 8 HOURS
Qty: 90 TABLET | Refills: 1 | Status: SHIPPED | OUTPATIENT
Start: 2022-05-02 | End: 2022-05-05 | Stop reason: SDUPTHER

## 2022-05-02 NOTE — TELEPHONE ENCOUNTER
Rx Refill Note  Requested Prescriptions      No prescriptions requested or ordered in this encounter      Last office visit with prescribing clinician: 1/6/2022      Next office visit with prescribing clinician: Visit date not found            Elham Daily MA  05/02/22, 11:08 EDT

## 2022-05-03 ENCOUNTER — TELEPHONE (OUTPATIENT)
Dept: FAMILY MEDICINE CLINIC | Facility: CLINIC | Age: 67
End: 2022-05-03

## 2022-05-03 NOTE — TELEPHONE ENCOUNTER
PATIENT IS PRESCRIBED propafenone (RYTHMOL) 225 MG tablet BY [facility], HE TAKES 3 TABLETS PER DAY (PER EVERY 8 HOURS)  HE HAS ALWAYS GOTTEN 90 DAYS WORTH WHICH WOULD EQUAL A QUANTITY  HOWEVER WE HAVE ONLY BEEN SENDING OVER A QUANTITY OF 90 WHICH LASTS 30DAYS. IT IS COSTING THE PATIENT MORE MONEY AND TIME.  HE HAS ALREADY PICKED UP THE REFILL WE APPROVED AND SENT OVER YESTERDAY OF 30 DAYS WORTH HOWEVER NEXT MONTH IN 30 DAYS WHEN HES DUE FOR ANOTHER REFILL PLEASE MAKE IT FOR A QUANTITY  , 90 DAYS WORTH..       PATIENT> [phone]

## 2022-05-05 DIAGNOSIS — I48.0 PAROXYSMAL ATRIAL FIBRILLATION: ICD-10-CM

## 2022-05-05 RX ORDER — PROPAFENONE HYDROCHLORIDE 225 MG/1
225 TABLET, FILM COATED ORAL EVERY 8 HOURS
Qty: 270 TABLET | Refills: 1 | Status: SHIPPED | OUTPATIENT
Start: 2022-05-05 | End: 2022-12-01 | Stop reason: SDUPTHER

## 2022-06-01 ENCOUNTER — TELEPHONE (OUTPATIENT)
Dept: NEUROLOGY | Facility: CLINIC | Age: 67
End: 2022-06-01

## 2022-06-02 ENCOUNTER — APPOINTMENT (OUTPATIENT)
Dept: INFUSION THERAPY | Facility: HOSPITAL | Age: 67
End: 2022-06-02

## 2022-07-26 NOTE — PROGRESS NOTES
"Chief Complaint  Dizziness (Dizziness and nausea since surgeries 5/1/22. Intermittent, worse when changing positrons. Sporadic L ear quick sharp pain 8/10)    Masks/face shield/appropriate PPE were worn for the entirety of the visit by the patient, MA, and provider.     Subjective        Hoang So presents to [facility]  History of Present Illness  Hoang So is a 66 y.o. male who presents to the clinic today with concerns of dizziness.  Patient first noticed the symptoms after his carpal tunnel surgery to the left arm in May.  After his surgery, he had dizziness and nausea.  Patient states that his symptoms of dizziness occur intermittently and are not consistent.  His dizziness typically occurs with turning his head or when getting out of bed.  He also notices dizziness when getting out of his chair.  He states the dizziness lasts less than 30 seconds and episodes subside on their own.  He states he has not fallen.  He denies vision loss, difficulties with speech, or confusion.  Patient is eating and drinking well.  He also reports intermittent left ear pain.  He has a history of atrial fibrillation and denies noticing any abnormal heart rhythms, chest pain, heart palpitations, or shortness of breath.    Review of Systems   HENT: Positive for ear pain.    Neurological: Positive for dizziness.     Objective   Vital Signs:  /72 (BP Location: Right arm, Patient Position: Standing)   Pulse 76   Temp 97.1 °F (36.2 °C)   Ht 182.9 cm (72\")   Wt 135 kg (297 lb)   SpO2 97%   BMI 40.28 kg/m²   Estimated body mass index is 40.28 kg/m² as calculated from the following:    Height as of this encounter: 182.9 cm (72\").    Weight as of this encounter: 135 kg (297 lb).          Physical Exam  Constitutional:       General: He is not in acute distress.     Appearance: He is not ill-appearing, toxic-appearing or diaphoretic.   HENT:      Head: Normocephalic and atraumatic.      Right " Ear: Tympanic membrane is scarred.      Left Ear: No decreased hearing noted. No drainage, swelling or tenderness. There is no impacted cerumen.      Ears:      Comments: Appearance of otoconia to left TM  Eyes:      General: No scleral icterus.        Right eye: No discharge.         Left eye: No discharge.      Extraocular Movements: Extraocular movements intact.   Pulmonary:      Effort: No respiratory distress.   Neurological:      General: No focal deficit present.      Mental Status: He is alert and oriented to person, place, and time.   Psychiatric:         Mood and Affect: Mood normal.         Behavior: Behavior normal.        Result Review :                Flowsheets      Older Vitals  7/27/22 2:21 PM  7/27/22 2:23 PM  7/27/22 2:25 PM    BP  110/80   102/78   102/72     BP Location  Right arm   Right arm   Right arm     Patient Position  Lying   Sitting   Standing     Pulse  97   76   76     SpO2  98%   98%   97%              Assessment and Plan   Diagnoses and all orders for this visit:    1. Benign paroxysmal positional vertigo of left ear (Primary)  -     Ambulatory Referral to Physical Therapy Evaluate and treat      Patient symptoms likely positional related to BPPV based on his description today.  Orthostatics within normal limits.  Patient is eating and drinking well.  Neurologic exam within normal limits.  Patient was advised to change positions slowly.  Patient was referred to physical therapy.  We discussed trying meclizine, patient has tried Dramamine and did not notice an improvement.  Patient was advised to call if symptoms worsen or do not improve.         Follow Up   Return if symptoms worsen or fail to improve, for Next scheduled follow up.  Patient was given instructions and counseling regarding his condition or for health maintenance advice. Please see specific information pulled into the AVS if appropriate.     Electronically signed by ASAEL Hilario, 07/27/22, 5:29 PM EDT.

## 2022-07-27 ENCOUNTER — OFFICE VISIT (OUTPATIENT)
Dept: FAMILY MEDICINE CLINIC | Facility: CLINIC | Age: 67
End: 2022-07-27

## 2022-07-27 VITALS
SYSTOLIC BLOOD PRESSURE: 102 MMHG | BODY MASS INDEX: 40.23 KG/M2 | OXYGEN SATURATION: 97 % | HEIGHT: 72 IN | WEIGHT: 297 LBS | TEMPERATURE: 97.1 F | HEART RATE: 76 BPM | DIASTOLIC BLOOD PRESSURE: 72 MMHG

## 2022-07-27 DIAGNOSIS — H81.12 BENIGN PAROXYSMAL POSITIONAL VERTIGO OF LEFT EAR: Primary | ICD-10-CM

## 2022-07-27 PROCEDURE — 99213 OFFICE O/P EST LOW 20 MIN: CPT | Performed by: NURSE PRACTITIONER

## 2022-08-15 ENCOUNTER — TREATMENT (OUTPATIENT)
Dept: PHYSICAL THERAPY | Facility: CLINIC | Age: 67
End: 2022-08-15

## 2022-08-15 DIAGNOSIS — H81.10 BENIGN PAROXYSMAL POSITIONAL VERTIGO, UNSPECIFIED LATERALITY: ICD-10-CM

## 2022-08-15 DIAGNOSIS — H81.12 BENIGN PAROXYSMAL POSITIONAL VERTIGO OF LEFT EAR: ICD-10-CM

## 2022-08-15 DIAGNOSIS — R42 DIZZINESSES: Primary | ICD-10-CM

## 2022-08-15 PROCEDURE — 97162 PT EVAL MOD COMPLEX 30 MIN: CPT | Performed by: PHYSICAL THERAPIST

## 2022-08-15 PROCEDURE — 95992 CANALITH REPOSITIONING PROC: CPT | Performed by: PHYSICAL THERAPIST

## 2022-08-15 NOTE — PROGRESS NOTES
Physical Therapy Initial Evaluation and Plan of Care    Patient: Hoang So   : 1955  Diagnosis/ICD-10 Code:  Dizzinesses [R42]  Referring practitioner: ASAEL Rivera  Date of Initial Visit: 8/15/2022  Today's Date: 8/15/2022  Patient seen for 1 session         Visit Diagnoses:    ICD-10-CM ICD-9-CM   1. Dizzinesses  R42 780.4   2. Benign paroxysmal positional vertigo, unspecified laterality  H81.10 386.11         Subjective Questionnaire: DHI: 32%      Subjective Evaluation    History of Present Illness  Mechanism of injury: 66 year old c/o dizziness following L carpal tunnel and ulnar n. surgery in May of this year. States consistent  Dizziness primarily with moving in bed jacki laying down. Seen by ASAEL for eval with no wax build up thus referred for BPPV eval. Also does have some dizziness on standing but office notes from APRN ruled out orthostatic hypotension. Pt has been on benadryl x 2 years most every night for itching. Pt gained no relief from OTC dramamine. Pt has not vomited since original severe dizziness. Intensity of dizziness not changing. Also has a severe sharp pain in his L ear.   Hx cervical fusion age 45. Retired . 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   Still can golf and enjoy life but frustrated with ear pain that occurred this morning, and the sudden surprising dizziness he gets with transitional movements mostly in bed.       Patient Occupation: retired Quality of life: excellent    Pain  Current pain ratin  At best pain ratin  At worst pain ratin  Location: L ear  Quality: sharp  Aggravating factors: movement  Progression: no change    Diagnostic Tests  No diagnostic tests performed    Treatments  No previous or current treatments           Objective          Active Range of Motion   Cervical/Thoracic Spine   Cervical    Flexion: 45 degrees   Extension: 25 degrees   Left lateral flexion: 20 degrees    Right lateral flexion: 20 degrees   Left rotation: 45 degrees   Right rotation: 45 degrees     Additional Active Range of Motion Details  Stiffness with spine but no end range pain    Ambulation     Comments   [place] test done first to L due to L sided ear issue. Sudden dizziness when laying but very brief lasting only 5 seconds with no nystagmus seen in short time of dizziness. No issue with DH test to R.   Epley Maneuver done for L horizontal canal. Again strong and sudden dizziness lasting only 5 seconds, but this time very subtle horizontal nystagmus seen. Transition movements done with about 45 seconds between positions. Pt tolerating neck extension and might utilize his spouse to help support his head when doing L Epley at home the next week.   Education using youtube videos of test and rx done prior to exam today to help with comprehension, jacki due to pt's limited neck ROM.           Assessment & Plan     Assessment    Assessment details: Pt given written instruction on how to do Epley one rep, 3x/ day for the next 7 days. No sleeping on L side if possible.  Co-morbidity due to some limitation in neck mobility, but should be able to encourage crystal movement for BPPV as he did well with speed, neck extension, and timing of transitions.   Did not reproduced deep sharp pain in his L ear today which is unusual for standard BPPV. Due to this uncommon symptom, feel that this condition is unstable and will most likely require further medical assessment.   Recommended pt try home Epley the next week but if deeper sharp pain continues, consult an ENT to r/o neuritis or some other source of inflammation. Pt had also thought he might have L TMJ and asked his dentist this lately. No displacement or crepitus noted today.   Also recommended pt try to cut back on use of Benadryl by 50% as this could have some impact on dizziness per med side effect list.     Goals  Plan Goals: STGs 1 week:  1. Perform Epley at home  tid with decrease in occurrence of dizziness by 50%  2. Reduce frequency and intensity of L sharp ear pain by 50% or consult with ENT  asap if no change  3. Reduce use of Benadryl by 50%  LTGs 4 weeks:  1. Resolve dizziness 90%  2. DHI score to improve from 32 to < 10%  3. Resolve sharp L ear pain completely after dx and rx    Plan  Frequency: 2x month  Duration in weeks: 12  Treatment plan discussed with: patient        History # of Personal Factors and/or Comorbidities: MODERATE (1-2)  Examination of Body System(s): # of elements: LOW (1-2)  Clinical Presentation: UNSTABLE   Clinical Decision Making: MODERATE      Timed:         Manual Therapy:         mins  00528;     Therapeutic Exercise:         mins  02317;     Neuromuscular Kimber:        mins  63846;    Therapeutic Activity:          mins  54519;     Gait Training:           mins  98745;     Ultrasound:          mins  26087;    Ionto                                   mins   34362  Canalith Repos    25     mins 36466      Un-Timed:  Electrical Stimulation:         mins  82747 (MC );  Dry Needling          mins  47079/59436  Traction          mins  92993  Low Eval          Mins  10512  Mod Eval     25     Mins  69817  High Eval                            Mins  36742        Timed Treatment:   25   mins   Total Treatment:     50   mins          PT: Zorre Zeno Kimura, PT, DPT     License Number:  [id number]     [place]:  [id number]    Electronically signed by Zorre Zeno Kimura, PT, 08/15/22, 8:51 AM EDT    Certification Period: 8/15/2022 thru 11/12/2022  I certify that the therapy services are furnished while this patient is under my care.  The services outlined above are required by this patient, and will be reviewed every 90 days.         Physician Signature:__________________________________________________    PHYSICIAN: Piper Roman APRN      DATE:     Please sign and return via fax to [email or web address] . Thank you, [facility].

## 2022-09-01 ENCOUNTER — TELEPHONE (OUTPATIENT)
Dept: CARDIOLOGY | Facility: CLINIC | Age: 67
End: 2022-09-01

## 2022-09-01 NOTE — TELEPHONE ENCOUNTER
Faxed clearance request [facility] from [facility].    Patient is scheduled for right carpal tunnel release under wrist block anesthesia on 9/28 with Dr. Galvez.    He is on r-ban for afib.  NO hx of stroke or valve.    Please advise on holding r-ban prior.

## 2022-09-02 DIAGNOSIS — I10 HYPERTENSION, UNSPECIFIED TYPE: ICD-10-CM

## 2022-10-05 NOTE — PROGRESS NOTES
"Chief Complaint  Establish Care (New pt - nonfasting - Cardio & hand) and Earache (Severe L earache that \"comes and goes\". Discussed w dentist - no dental issues. Pain 9/10 )    Masks/face shield/appropriate PPE were worn for the entirety of the visit by the patient, MA, and provider.     Subjective        Hoang So presents to [facility]  History of Present Illness  Hoang So is a 67 y.o. male who presents to the clinic to establish care. Patient was previously following with a [facility] provider.     Medical History:   · Atrial fibrillation: Patient is currently following with cardiologist, Dr. Aguillon as well as Dr. Lee.  He is currently taking metoprolol, propafenone, and Xarelto.  Patient states he does bruise easily with Xarelto, but denies blood in his stool or urine.    · Hyperlipidemia: Lipid panel last evaluated January 2022 and within normal limits.  Patient is currently taking rosuvastatin 5 mg nightly.  Patient denies myalgias with this medication.  · Hypertension: Patient's blood pressure is well controlled.  · Patient also has a history of multiple bilateral shoulder surgeries.  He also underwent L2/3 laminectomy and synovial cyst resection on 4/23/2021.   Patient underwent carpal tunnel surgery of right hand 1 week ago.    Complaints today:   Left ear pain: Patient states he has had left ear pain for about 6 months.  There is nothing that he can pinpoint that triggers the ear pain.  He states the pain will occur randomly and typically will not last long.  The longest the pain lasted is about 15 to 20 seconds.  He states he does not feel ear pressure or fullness.  He denies jaw pain, headache, or temple pain.  Patient did have dizziness in July and met with a physical therapist.  Patient denies dizziness today.  He denies fever or chills.  Patient did go to the dentist and had a x-ray and he reports that the dentist did not find any concerns " "on x-ray.      Review of Systems   Constitutional: Negative for chills, fatigue and fever.   HENT: Positive for ear pain. Negative for congestion, dental problem, rhinorrhea, sinus pressure and sinus pain.    Respiratory: Negative for cough and shortness of breath.    Cardiovascular: Negative for chest pain, palpitations and leg swelling.   Neurological: Negative for dizziness, tremors, syncope, facial asymmetry, weakness, light-headedness and headaches.       Objective   Vital Signs:  /70   Pulse 76   Temp 96.9 °F (36.1 °C)   Ht 182.9 cm (72\")   Wt 134 kg (295 lb)   SpO2 98%   BMI 40.01 kg/m²   Estimated body mass index is 40.01 kg/m² as calculated from the following:    Height as of this encounter: 182.9 cm (72\").    Weight as of this encounter: 134 kg (295 lb).          Physical Exam  Constitutional:       General: He is not in acute distress.     Appearance: Normal appearance. He is obese. He is not ill-appearing, toxic-appearing or diaphoretic.   HENT:      Head: Normocephalic and atraumatic.      Right Ear: Tympanic membrane, ear canal and external ear normal.      Left Ear: Tympanic membrane, ear canal and external ear normal.   Eyes:      General: No scleral icterus.        Right eye: No discharge.         Left eye: No discharge.   Cardiovascular:      Rate and Rhythm: Normal rate and regular rhythm.   Pulmonary:      Effort: No respiratory distress.      Breath sounds: Normal breath sounds. No wheezing.   Abdominal:      General: There is no distension.      Palpations: Abdomen is soft.      Tenderness: There is no abdominal tenderness.   Neurological:      General: No focal deficit present.      Mental Status: He is alert and oriented to person, place, and time.      Motor: No weakness.      Gait: Gait normal.   Psychiatric:         Mood and Affect: Mood normal.         Behavior: Behavior normal.        Result Review :                Assessment and Plan   Diagnoses and all orders for this " visit:    1. Left ear pain (Primary)  -     Ambulatory Referral to ENT (Otolaryngology)    2. Primary hypertension  Assessment & Plan:  Hypertension is improving with treatment.  Continue current treatment regimen.  Regular aerobic exercise.  Continue current medications.  Blood pressure will be reassessed at the next regular appointment.      3. Hyperlipidemia, unspecified hyperlipidemia type  Assessment & Plan:  Lipid abnormalities are stable with simvastatin 5 mg daily.  Pharmacotherapy as ordered.  Lipids will be reassessed prior to medicare wellness.      4. Atrial fibrillation, unspecified type (HCC)  Assessment & Plan:  Patient will continue his current medications as prescribed.  He will continue to follow with Dr. Aguillon and Dr. Lee as advised.       5. Need for vaccination  -     Fluzone High-Dose 65+yrs (2979-3378)      Left ear pain: Unclear etiology at this time.  TM is within normal limits and intact.  Patient does have some dry flaky skin to his ear canal that could be a result of eczema.  Due to length of patient's symptoms, will refer to ENT.       Follow Up   Return in about 4 months (around 2/7/2023), or if symptoms worsen or fail to improve, for Medicare Wellness- fasting labs 1-2 weeks prior.  Patient was given instructions and counseling regarding his condition or for health maintenance advice. Please see specific information pulled into the AVS if appropriate.     Electronically signed by ASAEL Hilario, 10/07/22, 4:44 PM EDT.

## 2022-10-07 ENCOUNTER — OFFICE VISIT (OUTPATIENT)
Dept: FAMILY MEDICINE CLINIC | Facility: CLINIC | Age: 67
End: 2022-10-07

## 2022-10-07 VITALS
TEMPERATURE: 96.9 F | DIASTOLIC BLOOD PRESSURE: 70 MMHG | HEART RATE: 76 BPM | SYSTOLIC BLOOD PRESSURE: 126 MMHG | BODY MASS INDEX: 39.96 KG/M2 | OXYGEN SATURATION: 98 % | HEIGHT: 72 IN | WEIGHT: 295 LBS

## 2022-10-07 DIAGNOSIS — I48.91 ATRIAL FIBRILLATION, UNSPECIFIED TYPE: ICD-10-CM

## 2022-10-07 DIAGNOSIS — E78.5 HYPERLIPIDEMIA, UNSPECIFIED HYPERLIPIDEMIA TYPE: ICD-10-CM

## 2022-10-07 DIAGNOSIS — I10 PRIMARY HYPERTENSION: ICD-10-CM

## 2022-10-07 DIAGNOSIS — Z23 NEED FOR VACCINATION: ICD-10-CM

## 2022-10-07 DIAGNOSIS — H92.02 LEFT EAR PAIN: Primary | ICD-10-CM

## 2022-10-07 PROCEDURE — 99214 OFFICE O/P EST MOD 30 MIN: CPT | Performed by: NURSE PRACTITIONER

## 2022-10-07 PROCEDURE — 90662 IIV NO PRSV INCREASED AG IM: CPT | Performed by: NURSE PRACTITIONER

## 2022-10-07 PROCEDURE — G0008 ADMIN INFLUENZA VIRUS VAC: HCPCS | Performed by: NURSE PRACTITIONER

## 2022-10-07 NOTE — ASSESSMENT & PLAN NOTE
Patient will continue his current medications as prescribed.  He will continue to follow with Dr. Aguillon and Dr. Lee as advised.

## 2022-10-07 NOTE — ASSESSMENT & PLAN NOTE
Lipid abnormalities are stable with simvastatin 5 mg daily.  Pharmacotherapy as ordered.  Lipids will be reassessed prior to medicare wellness.

## 2022-10-20 DIAGNOSIS — I48.0 PAROXYSMAL ATRIAL FIBRILLATION: ICD-10-CM

## 2022-10-20 RX ORDER — RIVAROXABAN 20 MG/1
TABLET, FILM COATED ORAL
Qty: 90 TABLET | Refills: 3 | Status: SHIPPED | OUTPATIENT
Start: 2022-10-20 | End: 2022-12-01 | Stop reason: SDUPTHER

## 2022-11-29 ENCOUNTER — TELEPHONE (OUTPATIENT)
Dept: FAMILY MEDICINE CLINIC | Facility: CLINIC | Age: 67
End: 2022-11-29

## 2022-11-29 DIAGNOSIS — I10 HYPERTENSION, UNSPECIFIED TYPE: ICD-10-CM

## 2022-11-29 NOTE — TELEPHONE ENCOUNTER
Rx Refill Note  Requested Prescriptions     Pending Prescriptions Disp Refills   • metoprolol tartrate (LOPRESSOR) 25 MG tablet 90 tablet 0     Sig: Take 0.5 tablets by mouth Every 12 (Twelve) Hours.      Last office visit with prescribing clinician: 10/7/2022   Last telemedicine visit with prescribing clinician: 2/9/2023   Next office visit with prescribing clinician: 2/9/2023                         Would you like a call back once the refill request has been completed: [] Yes [] No    If the office needs to give you a call back, can they leave a voicemail: [] Yes [] No    Jessica Hart MA/LMR  11/29/22, 11:20 EST

## 2022-11-29 NOTE — TELEPHONE ENCOUNTER
Caller: [facility] [id number] [place], [place] [address]  [address] [facility] [phone] [place] [phone] FX    Relationship: Pharmacy    Best call back number:[phone]    Requested Prescriptions:   Requested Prescriptions     Pending Prescriptions Disp Refills   • metoprolol tartrate (LOPRESSOR) 25 MG tablet 90 tablet 0     Sig: Take 0.5 tablets by mouth Every 12 (Twelve) Hours.        Pharmacy where request should be sent: [facility] [id number] [place], [place] - [address] AT [facility] [phone] [place] [phone] FX     Additional details provided by patient:   Does the patient have less than a 3 day supply:  [] Yes  [] No    Would you like a call back once the refill request has been completed: [x] Yes [] No    If the office needs to give you a call back, can they leave a voicemail: [x] Yes [] No    Whitley Garrett Rep   11/29/22 10:35 EST

## 2022-11-30 DIAGNOSIS — I10 HYPERTENSION, UNSPECIFIED TYPE: ICD-10-CM

## 2022-12-01 ENCOUNTER — OFFICE VISIT (OUTPATIENT)
Dept: CARDIOLOGY | Facility: CLINIC | Age: 67
End: 2022-12-01

## 2022-12-01 VITALS
HEIGHT: 72 IN | BODY MASS INDEX: 40.23 KG/M2 | DIASTOLIC BLOOD PRESSURE: 80 MMHG | RESPIRATION RATE: 22 BRPM | OXYGEN SATURATION: 99 % | WEIGHT: 297 LBS | SYSTOLIC BLOOD PRESSURE: 126 MMHG | HEART RATE: 68 BPM

## 2022-12-01 DIAGNOSIS — I10 HYPERTENSION, UNSPECIFIED TYPE: ICD-10-CM

## 2022-12-01 DIAGNOSIS — G47.33 OSA (OBSTRUCTIVE SLEEP APNEA): ICD-10-CM

## 2022-12-01 DIAGNOSIS — I48.0 PAROXYSMAL ATRIAL FIBRILLATION: ICD-10-CM

## 2022-12-01 DIAGNOSIS — I48.19 PERSISTENT ATRIAL FIBRILLATION: ICD-10-CM

## 2022-12-01 DIAGNOSIS — E78.2 MIXED HYPERLIPIDEMIA: Primary | ICD-10-CM

## 2022-12-01 DIAGNOSIS — I10 BENIGN ESSENTIAL HYPERTENSION: ICD-10-CM

## 2022-12-01 PROBLEM — I48.91 ATRIAL FIBRILLATION: Status: RESOLVED | Noted: 2017-10-13 | Resolved: 2022-12-01

## 2022-12-01 PROBLEM — I48.91 ATRIAL FIBRILLATION (HCC): Status: RESOLVED | Noted: 2018-04-19 | Resolved: 2022-12-01

## 2022-12-01 PROCEDURE — 99213 OFFICE O/P EST LOW 20 MIN: CPT | Performed by: INTERNAL MEDICINE

## 2022-12-01 PROCEDURE — 93000 ELECTROCARDIOGRAM COMPLETE: CPT | Performed by: INTERNAL MEDICINE

## 2022-12-01 RX ORDER — PROPAFENONE HYDROCHLORIDE 225 MG/1
225 TABLET, FILM COATED ORAL EVERY 8 HOURS
Qty: 270 TABLET | Refills: 3 | Status: SHIPPED | OUTPATIENT
Start: 2022-12-01

## 2022-12-01 RX ORDER — ROSUVASTATIN CALCIUM 10 MG/1
5 TABLET, COATED ORAL
Qty: 90 TABLET | Refills: 3 | Status: SHIPPED | OUTPATIENT
Start: 2022-12-01

## 2022-12-01 NOTE — PROGRESS NOTES
CARDIOLOGY    Niki Aguillon MD    ENCOUNTER DATE:  12/01/2022    Hoang So / 67 y.o. / male        CHIEF COMPLAINT / REASON FOR OFFICE VISIT     Heart Problem (Yearly follow up/PAF,  SULLY, HTN)      HISTORY OF PRESENT ILLNESS       HPI    Hoang So is a 67 y.o. male     This is a nice gentleman who presented to the cardiac evaluation clinic in 10/2017.  He complained of dizziness and shortness of breath.  He was found to be having paroxysms of atrial fibrillation.  He was given IV Lasix and IV diltiazem as well as IV metoprolol.  He had an echocardiogram which revealed asymmetric left ventricular hypertrophy, normal LV systolic function, and no significant valvular heart disease.  He was discharged on oral diltiazem.  He had recurrent, symptomatic atrial fibrillation and was started on propafenone and Xarelto.  He had a treadmill stress test in December 2017 which did not show any evidence of ischemia.     He follows with Dr. Lee. He is on propafenone.    He denies chest pain, shortness of breath or palpitations.  He has a lot of musculoskeletal pain.  He tried coming off his Crestor but it did not improve his symptoms    REVIEW OF SYSTEMS     Review of Systems   Constitutional: Negative for chills, fever, weight gain and weight loss.   Cardiovascular: Negative for leg swelling.   Respiratory: Positive for snoring. Negative for cough and wheezing.    Hematologic/Lymphatic: Negative for bleeding problem. Does not bruise/bleed easily.   Skin: Negative for color change.   Musculoskeletal: Positive for joint pain and myalgias. Negative for falls.   Gastrointestinal: Negative for melena.   Genitourinary: Negative for hematuria.   Neurological: Negative for excessive daytime sleepiness.   Psychiatric/Behavioral: Negative for depression. The patient is not nervous/anxious.          VITAL SIGNS     Visit Vitals  /80 (BP Location: Right arm, Patient Position: Sitting, Cuff Size: Adult)   Pulse 68  "  Resp 22   Ht 182.9 cm (72\")   Wt 135 kg (297 lb)   SpO2 99%   BMI 40.28 kg/m²         Wt Readings from Last 3 Encounters:   12/01/22 135 kg (297 lb)   10/07/22 134 kg (295 lb)   07/27/22 135 kg (297 lb)     Body mass index is 40.28 kg/m².      PHYSICAL EXAMINATION     Constitutional:       General: Not in acute distress.  Neck:      Vascular: No carotid bruit or JVD.   Pulmonary:      Effort: Pulmonary effort is normal.      Breath sounds: Normal breath sounds.   Cardiovascular:      Normal rate. Regular rhythm.      Murmurs: There is no murmur.   Psychiatric:         Mood and Affect: Mood and affect normal.           REVIEWED DATA       ECG 12 Lead    Date/Time: 12/1/2022 9:22 AM  Performed by: Niki Aguillon MD  Authorized by: Niki Aguillon MD   Comparison: compared with previous ECG from 3/16/2022  Similar to previous ECG  Rhythm: sinus rhythm  BPM: 68  Conduction: conduction normal  ST Segments: ST segments normal  T Waves: T waves normal    Clinical impression: normal ECG              Lipid Panel    Lipid Panel 1/6/22   Total Cholesterol 156   Triglycerides 146   HDL Cholesterol 33 (A)   VLDL Cholesterol 26   LDL Cholesterol  97   LDL/HDL Ratio 2.9   (A) Abnormal value       Comments are available for some flowsheets but are not being displayed.             Lab Results   Component Value Date    GLUCOSE 94 01/06/2022    BUN 22 01/06/2022    CREATININE 1.14 01/06/2022    EGFRIFNONA 66 01/06/2022    EGFRIFAFRI 76 01/06/2022    BCR 19 01/06/2022    K 4.2 01/06/2022    CO2 24 01/06/2022    CALCIUM 9.6 01/06/2022    PROTENTOTREF 6.8 01/06/2022    ALBUMIN 4.2 01/06/2022    LABIL2 1.6 01/06/2022    AST 16 01/06/2022    ALT 27 01/06/2022       ASSESSMENT & PLAN      Diagnosis Plan   1. Mixed hyperlipidemia  rosuvastatin (CRESTOR) 10 MG tablet    ECG 12 Lead      2. Benign essential hypertension  ECG 12 Lead      3. SULLY (obstructive sleep apnea)  ECG 12 Lead      4. Persistent atrial fibrillation (HCC)  ECG 12 " Lead      5. Paroxysmal atrial fibrillation (HCC)  propafenone (RYTHMOL) 225 MG tablet    rivaroxaban (Xarelto) 20 MG tablet    ECG 12 Lead      6. Hypertension, unspecified type  metoprolol tartrate (LOPRESSOR) 25 MG tablet    ECG 12 Lead            1.  Persistent atrial fibrillation.  Chads 2 Vascor of 2.  Continue rivaroxaban, propafenone and metoprolol tartrate.  He also follows up with Dr. Lee and Rajani.   2.  Hypertension.  Blood pressure is well controlled.  3.  Hyperlipidemia.  On rosuvastatin.  I reviewed his most recent lipid panel.    His primary doctor in February 2023 and will be due for blood work at that time.  4.  Obstructive sleep apnea.  On CPAP  5.  Obesity.  I encouraged regular exercise.     Follow-up with me in a year.      Orders Placed This Encounter   Procedures   • ECG 12 Lead     This order was created via procedure documentation     Order Specific Question:   Release to patient     Answer:   Routine Release           MEDICATIONS         Discharge Medications          Accurate as of December 1, 2022  9:23 AM. If you have any questions, ask your nurse or doctor.            Changes to Medications      Instructions Start Date   rivaroxaban 20 MG tablet  Commonly known as: Xarelto  What changed: how much to take  Changed by: Niki Aguillon MD   20 mg, Oral, Daily         Continue These Medications      Instructions Start Date   acetaminophen 500 MG tablet  Commonly known as: TYLENOL   1,000 mg, Oral, Every 6 Hours PRN, Prn      metoprolol tartrate 25 MG tablet  Commonly known as: LOPRESSOR   12.5 mg, Oral, Every 12 Hours      polyethylene glycol 17 g packet  Commonly known as: MIRALAX   17 g, Oral, Daily, prn      propafenone 225 MG tablet  Commonly known as: RYTHMOL   225 mg, Oral, Every 8 Hours      rosuvastatin 10 MG tablet  Commonly known as: CRESTOR   5 mg, Oral, Every Night at Bedtime         Stop These Medications    diphenhydrAMINE 25 mg capsule  Commonly known as:  GEMA  Stopped by: MD Niki Peters MD  12/01/22  09:23 EST    Part of this note may be an electronic transcription/translation of spoken language to printed text using the Dragon dictation system.

## 2022-12-08 ENCOUNTER — OFFICE VISIT (OUTPATIENT)
Dept: FAMILY MEDICINE CLINIC | Facility: CLINIC | Age: 67
End: 2022-12-08

## 2022-12-08 VITALS
WEIGHT: 292 LBS | HEART RATE: 71 BPM | HEIGHT: 72 IN | BODY MASS INDEX: 39.55 KG/M2 | TEMPERATURE: 98 F | SYSTOLIC BLOOD PRESSURE: 122 MMHG | OXYGEN SATURATION: 98 % | DIASTOLIC BLOOD PRESSURE: 74 MMHG

## 2022-12-08 DIAGNOSIS — M79.10 MUSCLE PAIN: Primary | ICD-10-CM

## 2022-12-08 DIAGNOSIS — Z13.21 ENCOUNTER FOR VITAMIN DEFICIENCY SCREENING: ICD-10-CM

## 2022-12-08 DIAGNOSIS — I48.19 PERSISTENT ATRIAL FIBRILLATION: ICD-10-CM

## 2022-12-08 DIAGNOSIS — R63.5 WEIGHT GAIN: ICD-10-CM

## 2022-12-08 PROCEDURE — 99214 OFFICE O/P EST MOD 30 MIN: CPT | Performed by: NURSE PRACTITIONER

## 2022-12-08 RX ORDER — CYCLOBENZAPRINE HCL 5 MG
5 TABLET ORAL 3 TIMES DAILY PRN
Qty: 30 TABLET | Refills: 0 | Status: SHIPPED | OUTPATIENT
Start: 2022-12-08 | End: 2023-01-05

## 2022-12-08 RX ORDER — METHYLPREDNISOLONE 4 MG/1
TABLET ORAL
Qty: 21 EACH | Refills: 0 | Status: SHIPPED | OUTPATIENT
Start: 2022-12-08 | End: 2023-01-05

## 2022-12-08 NOTE — PROGRESS NOTES
"Chief Complaint  Muscle Pain (Ongoing muscle pain x3M. Neurologist believes it's muscle inflammation. Fasting. \"Pain from neck to ankles\" Pain 10/10. Difficulty w tasks like brushing teeth, washing, etc. )    Masks/face shield/appropriate PPE were worn for the entirety of the visit by the patient, MA, and provider.     Subjective        Hoang So presents to [facility]  History of Present Illness  Hoang So is a 67 y.o. male who presents to the clinic with complaints of muscle pain.  This has been ongoing for 3 months.  Patient did see his neurologist.  He was having bilateral upper arm biceps pain and unstable gait along with neck pain.  Neurologist ordered a MRI of the cervical spine.  Patient states that the neurologist said the MRI did not show significant findings for his symptoms.  He said that it could be inflammation.  Patient states that he has muscle aches that cause trouble sleeping.  He is experiencing muscle pain from his \"neck to ankles\".  He has leg pain, arm pain, neck pain, and hand pain.  His symptoms are worse when trying to get up from lying or standing.  His symptoms improve once he is up and moving.  He denies significant swelling in his joints.  He does report sometimes he may have swelling in the joints of his hands.  He does have a history of carpal tunnel syndrome and surgery.  Patient is still able to perform normal activities like golfing.  He denies family history of thyroid disorder or autoimmune disorder.  He does report being on a different cholesterol medication in the past and had similar symptoms to what he is experiencing today.  Once this medication was stopped and he switched to rosuvastatin, his symptoms improved.  He does report that he tried to go off his rosuvastatin for a week, but still had symptoms.  .   Review of Systems   Constitutional: Negative for chills, fatigue and fever.   Musculoskeletal: Positive for gait problem and " "myalgias. Negative for joint swelling.       Objective   Vital Signs:  /74   Pulse 71   Temp 98 °F (36.7 °C)   Ht 182.9 cm (72\")   Wt 132 kg (292 lb)   SpO2 98%   BMI 39.60 kg/m²   Estimated body mass index is 39.6 kg/m² as calculated from the following:    Height as of this encounter: 182.9 cm (72\").    Weight as of this encounter: 132 kg (292 lb).          Physical Exam  Vitals reviewed.   Constitutional:       General: He is not in acute distress.     Appearance: Normal appearance. He is not ill-appearing, toxic-appearing or diaphoretic.   HENT:      Head: Normocephalic and atraumatic.   Eyes:      General: No scleral icterus.        Right eye: No discharge.         Left eye: No discharge.      Extraocular Movements: Extraocular movements intact.   Pulmonary:      Effort: Pulmonary effort is normal.   Musculoskeletal:      Right upper arm: Tenderness present. No swelling or bony tenderness.      Left upper arm: Tenderness present. No swelling or bony tenderness.      Right lower leg: No tenderness. No edema.      Left lower leg: No tenderness. No edema.      Comments: Bilateral biceps tension and tenderness on palpation.  No erythema noted.   Skin:     Findings: No erythema.   Neurological:      General: No focal deficit present.      Mental Status: He is alert and oriented to person, place, and time.      Motor: No weakness.      Gait: Gait normal.   Psychiatric:         Mood and Affect: Mood normal.         Behavior: Behavior normal.        Result Review :         MR cervical spine wo IV contrast (12/06/2022 09:25)           Assessment and Plan   Diagnoses and all orders for this visit:    1. Muscle pain (Primary)  -     Vitamin B12  -     Ferritin  -     Iron Profile  -     CK  -     Sedimentation rate, automated  -     C-reactive protein  -     MINESH Comprehensive Panel  -     RHEUMATOID FACTOR  -     TSH  -     methylPREDNISolone (MEDROL) 4 MG dose pack; Take as directed on package instructions.  " Dispense: 21 each; Refill: 0  -     cyclobenzaprine (FLEXERIL) 5 MG tablet; Take 1 tablet by mouth 3 (Three) Times a Day As Needed for Muscle Spasms (Pain).  Dispense: 30 tablet; Refill: 0    2. Encounter for vitamin deficiency screening  -     Vitamin B12  -     Ferritin  -     Iron Profile  -     CK    3. Weight gain  -     TSH    4. Persistent atrial fibrillation (HCC)  -     Ferritin  -     Iron Profile      Unclear etiology of patient's symptoms today.  He does have bilateral upper extremity tension and tenderness to his biceps muscles.  Cervical spine MRI, he does have noted canal stenosis, which could be contributing.  I do not have neurosurgeons note to review from yesterday.  I did recommend trying a Medrol Dosepak and muscle relaxer to help release this bicepes tension.  Patient is aware muscle relaxer can cause drowsiness and he should take it at night and not take prior to driving.  I would also recommend physical therapy.  However, he does have muscle pain not only to his bilateral upper extremities, but also multiple areas of his body.  He is on statin medication, so CK ordered today.  Also evaluate for other vitamin deficiencies or iron deficiency.  An MINESH and rheumatoid factor ordered as well.  We will follow-up with patient after labs have resulted.       Follow Up   Return in about 4 weeks (around 1/5/2023) for Recheck- muscle pain.  Patient was given instructions and counseling regarding his condition or for health maintenance advice. Please see specific information pulled into the AVS if appropriate.     Electronically signed by ASAEL Hilario, 12/08/22, 9:59 AM EST.

## 2022-12-09 LAB
CENTROMERE B AB SER-ACNC: <0.2 AI (ref 0–0.9)
CHROMATIN AB SERPL-ACNC: <0.2 AI (ref 0–0.9)
CK SERPL-CCNC: 56 U/L (ref 20–200)
CRP SERPL-MCNC: 1.03 MG/DL (ref 0–0.5)
DSDNA AB SER-ACNC: 1 IU/ML (ref 0–9)
ENA JO1 AB SER-ACNC: <0.2 AI (ref 0–0.9)
ENA RNP AB SER-ACNC: <0.2 AI (ref 0–0.9)
ENA SCL70 AB SER-ACNC: <0.2 AI (ref 0–0.9)
ENA SM AB SER-ACNC: <0.2 AI (ref 0–0.9)
ENA SS-A AB SER-ACNC: <0.2 AI (ref 0–0.9)
ENA SS-B AB SER-ACNC: <0.2 AI (ref 0–0.9)
ERYTHROCYTE [SEDIMENTATION RATE] IN BLOOD BY WESTERGREN METHOD: 18 MM/HR (ref 0–20)
FERRITIN SERPL-MCNC: 398 NG/ML (ref 30–400)
IRON SATN MFR SERPL: 17 % (ref 20–50)
IRON SERPL-MCNC: 63 MCG/DL (ref 59–158)
Lab: NORMAL
RHEUMATOID FACT SERPL-ACNC: <10 IU/ML
TIBC SERPL-MCNC: 373 MCG/DL
TSH SERPL DL<=0.005 MIU/L-ACNC: 1.73 UIU/ML (ref 0.27–4.2)
UIBC SERPL-MCNC: 310 MCG/DL (ref 112–346)
VIT B12 SERPL-MCNC: 297 PG/ML (ref 211–946)

## 2023-01-04 NOTE — PROGRESS NOTES
Chief Complaint  Muscle Pain (1M fu - muscle pain frequency is improving. Pain was completely resolved while taking medrol pack )    Masks/face shield/appropriate PPE were worn for the entirety of the visit by the patient, MA, and provider.     Subjective        Hoang So presents to [facility]  History of Present Illness  Hoang So is a 67 y.o. male who presents to the clinic today for a 4-week follow-up appointment.  Four weeks ago, patient complained of muscle pain and fatigue and was evaluated in our office.  Patient has been evaluated by neurosurgery for muscle pain as well.  Based on patient's physical examination, he was prescribed a Medrol Dosepak and muscle relaxer as he was found to have biceps tension and tenderness.  Physical therapy was recommended as well.  Lab work was performed.  Only abnormality to lab work was elevated CRP and low normal vitamin B12.  Patient states that the steroids improved his pain completely by day 2 and then came back 2 days after finishing the steroid.  However, the pain has not been as severe as it was.  He is unsure if the muscle relaxer helped as he was taking this along with the steroid.  Today, his left arm hurts a little more, but not like the pain he experienced 4 weeks ago.  Patient did start oral vitamin B12, but keeps forgetting to take.      Review of Systems   Musculoskeletal: Positive for myalgias.       Objective   Vital Signs:  /80   Pulse 87   Temp 98 °F (36.7 °C)   Ht 182.9 cm (72\")   Wt 131 kg (288 lb)   SpO2 98%   BMI 39.06 kg/m²   Estimated body mass index is 39.06 kg/m² as calculated from the following:    Height as of this encounter: 182.9 cm (72\").    Weight as of this encounter: 131 kg (288 lb).          Physical Exam  Vitals reviewed.   Constitutional:       General: He is not in acute distress.     Appearance: Normal appearance. He is not ill-appearing, toxic-appearing or diaphoretic.   HENT:       Head: Normocephalic and atraumatic.   Pulmonary:      Effort: Pulmonary effort is normal. No respiratory distress.   Musculoskeletal:      Left upper arm: Tenderness (biceps tension and tenderness) present.   Neurological:      General: No focal deficit present.      Mental Status: He is alert and oriented to person, place, and time.      Motor: No weakness.      Gait: Gait normal.   Psychiatric:         Mood and Affect: Mood normal.         Behavior: Behavior normal.        Result Review :         TSH (12/08/2022 08:52)  RHEUMATOID FACTOR (12/08/2022 08:52)  MINESH Comprehensive Panel (12/08/2022 08:52)  C-reactive protein (12/08/2022 08:52)  Sedimentation rate, automated (12/08/2022 08:52)  CK (12/08/2022 08:52)  Iron Profile (12/08/2022 08:52)  Ferritin (12/08/2022 08:52)  Vitamin B12 (12/08/2022 08:52)           Assessment and Plan   Diagnoses and all orders for this visit:    1. Muscle pain  -     cyclobenzaprine (FLEXERIL) 5 MG tablet; Take 1 tablet by mouth 3 (Three) Times a Day As Needed for Muscle Spasms (Pain).  Dispense: 30 tablet; Refill: 0      Patient's symptoms have started to improve.  Based on lab work, recommended patient take oral vitamin B12 350-500 mcg daily to see if this helps improve his symptoms.  Patient does have biceps tension and tenderness to the left arm.  I did recommend physical therapy.  Patient was given a list of stretches and strengthening exercises for biceps tendinopathy.  Cyclobenzaprine prescribed to take as needed.  Patient advised to follow-up if symptoms persist.    Patient did have questions regarding injectable weight loss medicines.  He was provided with information regarding Ozempic including the potential adverse effects.  He was also given a printout of information regarding Ozempic.  He will call if he decides he is interested in prescribing of this medicine.       Follow Up   Return in about 2 months (around 3/6/2023) for Medicare Wellness.  Patient was given  instructions and counseling regarding his condition or for health maintenance advice. Please see specific information pulled into the AVS if appropriate.     Electronically signed by ASAEL Hilario, 01/05/23, 10:17 AM EST.

## 2023-01-05 ENCOUNTER — OFFICE VISIT (OUTPATIENT)
Dept: FAMILY MEDICINE CLINIC | Facility: CLINIC | Age: 68
End: 2023-01-05
Payer: MEDICARE

## 2023-01-05 VITALS
SYSTOLIC BLOOD PRESSURE: 112 MMHG | OXYGEN SATURATION: 98 % | TEMPERATURE: 98 F | HEIGHT: 72 IN | BODY MASS INDEX: 39.01 KG/M2 | WEIGHT: 288 LBS | HEART RATE: 87 BPM | DIASTOLIC BLOOD PRESSURE: 80 MMHG

## 2023-01-05 DIAGNOSIS — M79.10 MUSCLE PAIN: ICD-10-CM

## 2023-01-05 PROCEDURE — 99213 OFFICE O/P EST LOW 20 MIN: CPT | Performed by: NURSE PRACTITIONER

## 2023-01-05 RX ORDER — CYCLOBENZAPRINE HCL 5 MG
5 TABLET ORAL 3 TIMES DAILY PRN
Qty: 30 TABLET | Refills: 0 | Status: SHIPPED | OUTPATIENT
Start: 2023-01-05 | End: 2023-03-17

## 2023-02-09 ENCOUNTER — OFFICE VISIT (OUTPATIENT)
Dept: FAMILY MEDICINE CLINIC | Facility: CLINIC | Age: 68
End: 2023-02-09
Payer: MEDICARE

## 2023-02-09 VITALS
TEMPERATURE: 97.3 F | HEART RATE: 81 BPM | DIASTOLIC BLOOD PRESSURE: 78 MMHG | SYSTOLIC BLOOD PRESSURE: 118 MMHG | HEIGHT: 72 IN | BODY MASS INDEX: 39.14 KG/M2 | WEIGHT: 289 LBS | OXYGEN SATURATION: 98 %

## 2023-02-09 DIAGNOSIS — M79.10 MUSCLE PAIN: ICD-10-CM

## 2023-02-09 DIAGNOSIS — M62.81 MUSCLE WEAKNESS: Primary | ICD-10-CM

## 2023-02-09 PROCEDURE — 99214 OFFICE O/P EST MOD 30 MIN: CPT | Performed by: NURSE PRACTITIONER

## 2023-02-09 RX ORDER — PREDNISONE 20 MG/1
TABLET ORAL
Qty: 13 TABLET | Refills: 0 | Status: SHIPPED | OUTPATIENT
Start: 2023-02-09 | End: 2023-02-17

## 2023-02-09 NOTE — PROGRESS NOTES
"The ABCs of the Annual Wellness Visit  Subsequent Medicare Wellness Visit    Masks/face shield/appropriate PPE were worn for the entirety of the visit by the patient, MA, and provider.     Subjective    {Wrapup  Review (Popup)  Advance Care Planning  Labs  CC  Problem List  Visit Diagnosis  Medications  Result Review  Imaging  [facility]  BestPractice  SmartSets  SnapShot  Encounters  Notes  Media  Procedures :23}  Hoang So is a 67 y.o. male who presents for a Subsequent Medicare Wellness Visit.    The following portions of the patient's history were reviewed and   updated as appropriate: {history reviewed:20406::\"allergies\",\"current medications\",\"past family history\",\"past medical history\",\"past social history\",\"past surgical history\",\"problem list\"}.    Compared to one year ago, the patient feels his physical   health is {better worse same:74977}.    Compared to one year ago, the patient feels his mental   health is {better worse same:57442}.    Recent Hospitalizations:  {Hospital Admission Status in the last 365 days:47445}      Current Medical Providers:  Patient Care Team:  Piper Roman APRN as PCP - General (Nurse Practitioner)  Dima Vallejo MD as Consulting Physician (Gastroenterology)  Andrey Hope MD as Consulting Physician (Neurosurgery)    Outpatient Medications Prior to Visit   Medication Sig Dispense Refill   • acetaminophen (TYLENOL) 500 MG tablet Take 1,000 mg by mouth Every 6 (Six) Hours As Needed for Mild Pain. Prn     • cyclobenzaprine (FLEXERIL) 5 MG tablet Take 1 tablet by mouth 3 (Three) Times a Day As Needed for Muscle Spasms (Pain). 30 tablet 0   • metoprolol tartrate (LOPRESSOR) 25 MG tablet Take 0.5 tablets by mouth Every 12 (Twelve) Hours. 90 tablet 3   • polyethylene glycol (MIRALAX) 17 g packet Take 17 g by mouth Daily. prn     • propafenone (RYTHMOL) 225 MG tablet Take 1 tablet by mouth Every 8 (Eight) Hours. 270 tablet 3   • " "rivaroxaban (Xarelto) 20 MG tablet Take 1 tablet by mouth Daily. 90 tablet 3   • rosuvastatin (CRESTOR) 10 MG tablet Take 0.5 tablets by mouth every night at bedtime. 90 tablet 3     No facility-administered medications prior to visit.       No opioid medication identified on active medication list. I have reviewed chart for other potential  high risk medication/s and harmful drug interactions in the elderly.          Aspirin is not on active medication list.  {ASPIRIN NOT ON MEDICATION LIST INDICATED/NOT INDICATED:49322}.    Patient Active Problem List   Diagnosis   • Mixed hyperlipidemia   • Hypertension   • Low back pain   • Leg pain, bilateral   • Snoring   • Benign essential hypertension   • Cervical radiculopathy   • Cervical stenosis of spine   • Lumbar radiculopathy   • SULLY (obstructive sleep apnea)   • Hypersomnia   • Persistent atrial fibrillation (HCC)   • Obesity (BMI 30-39.9)   • Anal fistula   • Screening for iron deficiency anemia   • Special screening for malignant neoplasm of prostate   • Trigger ring finger of left hand   • Morbid obesity (HCC)   • Weight gain   • Flu vaccine need   • Carpal tunnel syndrome of right wrist   • Bilateral carpal tunnel syndrome     Advance Care Planning  Advance Directive is not on file.  {ACP Discussion, Advance Directive not in EMR:98162}     Objective    There were no vitals filed for this visit.  Estimated body mass index is 39.06 kg/m² as calculated from the following:    Height as of 1/5/23: 182.9 cm (72\").    Weight as of 1/5/23: 131 kg (288 lb).    {Class 2 Severe Obesity (BMI >=35 and <=39.9.:4592097747}      Does the patient have evidence of cognitive impairment?   {Yes/No:22462}            HEALTH RISK ASSESSMENT    Smoking Status:  Social History     Tobacco Use   Smoking Status Former   • Packs/day: 0.25   • Years: 10.00   • Pack years: 2.50   • Types: Cigarettes   • Quit date: 1/1/2008   • Years since quitting: 15.1   Smokeless Tobacco Never   Tobacco " Comments    daily caffiene     Alcohol Consumption:  Social History     Substance and Sexual Activity   Alcohol Use Yes    Comment: 2-3 times/week      Fall Risk Screen:    LATIA Fall Risk Assessment has not been completed.    Depression Screening:  No flowsheet data found.    Health Habits and Functional and Cognitive Screening:  Functional & Cognitive Status 12/18/2020   Do you have difficulty preparing food and eating? No   Do you have difficulty bathing yourself, getting dressed or grooming yourself? No   Do you have difficulty using the toilet? No   Do you have difficulty moving around from place to place? No   Do you have trouble with steps or getting out of a bed or a chair? No   Current Diet Limited Junk Food   Dental Exam Up to date   Eye Exam Not up to date   Exercise (times per week) 7 times per week   Current Exercise Activities Include Walking   Do you need help using the phone?  No   Are you deaf or do you have serious difficulty hearing?  No   Do you need help with transportation? No   Do you need help shopping? No   Do you need help preparing meals?  No   Do you need help with housework?  No   Do you need help with laundry? No   Do you need help taking your medications? No   Do you need help managing money? No   Do you ever drive or ride in a car without wearing a seat belt? No       Age-appropriate Screening Schedule:  Refer to the list below for future screening recommendations based on patient's age, sex and/or medical conditions. Orders for these recommended tests are listed in the plan section. The patient has been provided with a written plan.    Health Maintenance   Topic Date Due   • TDAP/TD VACCINES (1 - Tdap) Never done   • ZOSTER VACCINE (1 of 2) Never done   • LIPID PANEL  01/06/2023   • INFLUENZA VACCINE  Completed                CMS Preventative Services Quick Reference  Risk Factors Identified During Encounter:    {Medicare Wellness Risk Factors:02836}    The above risks/problems have  been discussed with the patient.  Pertinent information has been shared with the patient in the After Visit Summary.    There are no diagnoses linked to this encounter.    Follow Up:   Next Medicare Wellness visit to be scheduled in 1 year.      An After Visit Summary and PPPS were made available to the patient.

## 2023-02-09 NOTE — PROGRESS NOTES
"Chief Complaint  Muscle Pain (Pt had temporary muscle and joint pain relief with Medrol pack. Completed flexeril - no relief. Pt states he is unable to move after sitting for 5 minutes, but once he starts moving he is \"fine\". Pain 8/10 )    Masks/face shield/appropriate PPE were worn for the entirety of the visit by the patient, MA, and provider.     Subjective        Hoang So presents to [facility]  History of Present Illness  Hoang So is a 67 y.o. male presents to the clinic today with complaints of muscle pain.  Patient first started having muscle pain in December 2022.  CK, TSH, rheumatoid factor, MINESH, and sed rate were normal at that time.  Vitamin B12 was low/normal and CRP was elevated.  Patient was treated with a Medrol Dosepak.  His symptoms improved after day 2 of the steroid pack.  Today, patient's pain has returned.  It is in the same location to his upper arms, specifically around the biceps.  Patient has difficulty raising his arms above his head.  He also has pain to the joints of his elbows and hips bilaterally.  His pain is worse when trying to stand after sitting.  If he is up and moving, his symptoms improve.  Patient did try stopping taking his rosuvastatin in the past for a week, but did not notice an improvement of his muscle pain.  He was also given cyclobenzaprine, but this did not help his muscle pain. He does feel this started after his COVID-19 vaccine.     Review of Systems   Constitutional: Negative for chills, fatigue and fever.   Musculoskeletal: Positive for arthralgias and myalgias. Negative for joint swelling.   Neurological: Negative for dizziness, tremors, facial asymmetry and light-headedness.       Objective   Vital Signs:  /78   Pulse 81   Temp 97.3 °F (36.3 °C)   Ht 182.9 cm (72\")   Wt 131 kg (289 lb)   SpO2 98%   BMI 39.20 kg/m²   Estimated body mass index is 39.2 kg/m² as calculated from the following:    Height as of this " "encounter: 182.9 cm (72\").    Weight as of this encounter: 131 kg (289 lb).             Physical Exam  Vitals reviewed.   Constitutional:       General: He is not in acute distress.     Appearance: Normal appearance. He is not ill-appearing, toxic-appearing or diaphoretic.   HENT:      Head: Normocephalic and atraumatic.   Eyes:      General: No scleral icterus.        Right eye: No discharge.         Left eye: No discharge.      Extraocular Movements: Extraocular movements intact.   Pulmonary:      Effort: Pulmonary effort is normal. No respiratory distress.   Musculoskeletal:      Right upper arm: No swelling, edema, deformity or lacerations.      Left upper arm: No swelling, edema, deformity or lacerations.      Comments: Strength 2/5 with bilateral lateral arm raise   Neurological:      General: No focal deficit present.      Mental Status: He is alert and oriented to person, place, and time.      Motor: Weakness present.      Gait: Gait normal.   Psychiatric:         Mood and Affect: Mood normal.         Behavior: Behavior normal.        Result Review :           Vitamin B12 (12/08/2022 08:52)  Ferritin (12/08/2022 08:52)  Iron Profile (12/08/2022 08:52)  CK (12/08/2022 08:52)  Sedimentation rate, automated (12/08/2022 08:52)  C-reactive protein (12/08/2022 08:52)  MINESH Comprehensive Panel (12/08/2022 08:52)  RHEUMATOID FACTOR (12/08/2022 08:52)  TSH (12/08/2022 08:52)    MR cervical spine wo IV contrast (12/06/2022 09:25)             Assessment and Plan   Diagnoses and all orders for this visit:    1. Muscle weakness (Primary)  -     Testosterone (Free & Total), LC / MS  -     Lactate Dehydrogenase  -     CK  -     C-reactive protein  -     Comprehensive metabolic panel  -     predniSONE (DELTASONE) 20 MG tablet; Take 3 tablets by mouth Daily for 2 days, THEN 2 tablets Daily for 2 days, THEN 1 tablet Daily for 2 days, THEN 0.5 tablets Daily for 2 days.  Dispense: 13 tablet; Refill: 0  -     AChR Modulating, " Serum    2. Muscle pain  -     Testosterone (Free & Total), LC / MS  -     Lactate Dehydrogenase  -     CK  -     C-reactive protein  -     Comprehensive metabolic panel  -     predniSONE (DELTASONE) 20 MG tablet; Take 3 tablets by mouth Daily for 2 days, THEN 2 tablets Daily for 2 days, THEN 1 tablet Daily for 2 days, THEN 0.5 tablets Daily for 2 days.  Dispense: 13 tablet; Refill: 0  -     AChR Modulating, Serum      At this time, I am unsure etiology of patient's muscle weakness and pain is undetermined.  Considering probably myositis, however, his CK was normal when last evaluated.  Will repeat today.  Will also ensure no Joi Barré syndrome, check a ACHR antibodies today.  His symptoms seem to improve with steroids, so will treat with a tapering dose of prednisone.  Patient advised to take this in the morning with food.  Call for any adverse side effects.  If symptoms persist or do not improve, will refer to neurology for possible EMG further evaluation of patient's symptoms.  Also consider carotid artery ultrasound?       I spent 35 minutes caring for Hoang on this date of service. This time includes time spent by me in the following activities:preparing for the visit, reviewing tests and ordering medications, tests, or procedures  Follow Up   Return in about 6 weeks (around 3/23/2023) for Recheck.  Patient was given instructions and counseling regarding his condition or for health maintenance advice. Please see specific information pulled into the AVS if appropriate.     Electronically signed by ASAEL Hilario, 02/13/23, 7:45 AM EST.

## 2023-02-15 LAB
ACHR MOD AB SER QL FC: 7 % (ref 0–45)
ALBUMIN SERPL-MCNC: 4.1 G/DL (ref 3.5–5.2)
ALBUMIN/GLOB SERPL: 1.5 G/DL
ALP SERPL-CCNC: 116 U/L (ref 39–117)
ALT SERPL-CCNC: 18 U/L (ref 1–41)
AST SERPL-CCNC: 19 U/L (ref 1–40)
BILIRUB SERPL-MCNC: 0.6 MG/DL (ref 0–1.2)
BUN SERPL-MCNC: 14 MG/DL (ref 8–23)
BUN/CREAT SERPL: 12 (ref 7–25)
CALCIUM SERPL-MCNC: 9.4 MG/DL (ref 8.6–10.5)
CHLORIDE SERPL-SCNC: 103 MMOL/L (ref 98–107)
CK SERPL-CCNC: 68 U/L (ref 20–200)
CO2 SERPL-SCNC: 26.9 MMOL/L (ref 22–29)
CREAT SERPL-MCNC: 1.17 MG/DL (ref 0.76–1.27)
CRP SERPL-MCNC: 0.75 MG/DL (ref 0–0.5)
EGFRCR SERPLBLD CKD-EPI 2021: 67.5 ML/MIN/1.73
GLOBULIN SER CALC-MCNC: 2.7 GM/DL
GLUCOSE SERPL-MCNC: 105 MG/DL (ref 65–99)
LDH SERPL L TO P-CCNC: 161 U/L (ref 135–225)
POTASSIUM SERPL-SCNC: 4.7 MMOL/L (ref 3.5–5.2)
PROT SERPL-MCNC: 6.8 G/DL (ref 6–8.5)
SODIUM SERPL-SCNC: 141 MMOL/L (ref 136–145)
TESTOST FREE SERPL-MCNC: 4.5 PG/ML (ref 6.6–18.1)
TESTOST SERPL-MCNC: 189.8 NG/DL (ref 264–916)

## 2023-03-07 ENCOUNTER — TELEPHONE (OUTPATIENT)
Dept: FAMILY MEDICINE CLINIC | Facility: CLINIC | Age: 68
End: 2023-03-07

## 2023-03-07 DIAGNOSIS — M62.81 MUSCLE WEAKNESS: Primary | ICD-10-CM

## 2023-03-07 DIAGNOSIS — M79.10 MUSCLE PAIN: ICD-10-CM

## 2023-03-07 NOTE — TELEPHONE ENCOUNTER
Caller: Hoang So     Relationship: SELF    Best call back number: [phone]    What is your medical concern? JOINT PAIN HAS RETURNED AFTER COMPLETING THE MEDICATION PRESCRIBED AT LAST APPOINTMENT. PATIENT STATES THAT A REFERRAL TO NEUROLOGY WAS MENTIONED AND HE WOULD LIKE TO GO FORWARD WITH THAT IF POSSIBLE, OR ANY OTHER FURTHER TREATMENT OPTIONS FOR JOINT PAIN    How long has this issue been going on? PAIN RETURNED LAST FEW DAYS SINCE STOPPING PREDNISONE     Is your provider already aware of this issue? YES    Have you been treated for this issue? YES      PLEASE CALL PATIENT TO DISCUSS AND ADVISE.

## 2023-03-17 ENCOUNTER — OFFICE VISIT (OUTPATIENT)
Dept: CARDIOLOGY | Facility: CLINIC | Age: 68
End: 2023-03-17
Payer: MEDICARE

## 2023-03-17 VITALS
HEART RATE: 72 BPM | WEIGHT: 293 LBS | BODY MASS INDEX: 39.68 KG/M2 | DIASTOLIC BLOOD PRESSURE: 94 MMHG | SYSTOLIC BLOOD PRESSURE: 138 MMHG | HEIGHT: 72 IN

## 2023-03-17 DIAGNOSIS — I10 BENIGN ESSENTIAL HYPERTENSION: ICD-10-CM

## 2023-03-17 DIAGNOSIS — I48.19 PERSISTENT ATRIAL FIBRILLATION: Primary | ICD-10-CM

## 2023-03-17 PROCEDURE — 3075F SYST BP GE 130 - 139MM HG: CPT | Performed by: INTERNAL MEDICINE

## 2023-03-17 PROCEDURE — 93000 ELECTROCARDIOGRAM COMPLETE: CPT | Performed by: INTERNAL MEDICINE

## 2023-03-17 PROCEDURE — 3080F DIAST BP >= 90 MM HG: CPT | Performed by: INTERNAL MEDICINE

## 2023-03-17 PROCEDURE — 99214 OFFICE O/P EST MOD 30 MIN: CPT | Performed by: INTERNAL MEDICINE
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
## 2023-04-14 NOTE — PROGRESS NOTES
"Chief Complaint  Abnormal Lab (Discuss 2/9/23 labs ) and Joint Pain (Pt dc Crestor ~1M and joint pain has significantly improved )    Subjective        Hoang So presents to [facility]  History of Present Illness  Hoang So is a 67 y.o. male who presents to the clinic today for routine follow-up appointment.  He is here to follow-up on joint pain and recent lab work.  Patient was having episodes of increased generalized muscle pain.  Patient has had 2 rounds of lab work for his complaints.  He was started on vitamin B12 supplementation.  Work-up for [place] Barré syndrome was negative.  Rheumatoid work-up negative.  CK was normal.  Testosterone levels were decreased. Patient's symptoms resolved with oral steroids, but returned.  1 month ago, patient stopped his Crestor and feels as if his joint pain has improved.  He still has some left bicep pain, but not severe.  In the past, patient's weight was also discussed.  Patient denies weight gain, but has difficulty losing weight.  He did discuss GLP-1 injectable medications with his cardiologist, who reprtedly had no concerns with patient moving forward with this treatment.  Patient denies history of pancreatitis, cholecystitis, or personal or family history of thyroid cancer.  Patient is scheduled to see neurology in July.    Review of Systems   Constitutional: Negative for chills, fatigue and fever.   Musculoskeletal: Negative for arthralgias and myalgias.       Objective   Vital Signs:  /76   Pulse 81   Temp 97.7 °F (36.5 °C)   Ht 182.9 cm (72\")   Wt 133 kg (293 lb)   SpO2 98%   BMI 39.74 kg/m²   Estimated body mass index is 39.74 kg/m² as calculated from the following:    Height as of this encounter: 182.9 cm (72\").    Weight as of this encounter: 133 kg (293 lb).             Physical Exam  Vitals reviewed.   Constitutional:       General: He is not in acute distress.     Appearance: Normal appearance. He is not " ill-appearing, toxic-appearing or diaphoretic.   HENT:      Head: Normocephalic and atraumatic.   Eyes:      General: No scleral icterus.        Right eye: No discharge.         Left eye: No discharge.      Extraocular Movements: Extraocular movements intact.   Pulmonary:      Effort: Pulmonary effort is normal. No respiratory distress.   Neurological:      General: No focal deficit present.      Mental Status: He is alert and oriented to person, place, and time.      Motor: No weakness.      Gait: Gait normal.   Psychiatric:         Mood and Affect: Mood normal.         Behavior: Behavior normal.        Result Review :        AChR Modulating, Serum (02/09/2023 10:48)  Comprehensive metabolic panel (02/09/2023 10:48)  C-reactive protein (02/09/2023 10:48)  CK (02/09/2023 10:48)  Lactate Dehydrogenase (02/09/2023 10:48)  Testosterone (Free & Total), LC / MS (02/09/2023 10:48)  TSH (12/08/2022 08:52)  RHEUMATOID FACTOR (12/08/2022 08:52)  MINESH Comprehensive Panel (12/08/2022 08:52)  C-reactive protein (12/08/2022 08:52)  Sedimentation rate, automated (12/08/2022 08:52)  CK (12/08/2022 08:52)  Iron Profile (12/08/2022 08:52)  Ferritin (12/08/2022 08:52)  Vitamin B12 (12/08/2022 08:52)             Assessment and Plan   Diagnoses and all orders for this visit:    1. Hyperlipidemia, unspecified hyperlipidemia type (Primary)  -     ezetimibe (Zetia) 10 MG tablet; Take 1 tablet by mouth Daily.  Dispense: 90 tablet; Refill: 0    2. Class 2 severe obesity due to excess calories with serious comorbidity and body mass index (BMI) of 39.0 to 39.9 in adult  -     Semaglutide,0.25 or 0.5MG/DOS, (OZEMPIC) 2 MG/1.5ML solution pen-injector; Inject 0.25 mg under the skin into the appropriate area as directed 1 (One) Time Per Week for 28 days.  Dispense: 1.5 mL; Refill: 0    3. Prediabetes  -     Semaglutide,0.25 or 0.5MG/DOS, (OZEMPIC) 2 MG/1.5ML solution pen-injector; Inject 0.25 mg under the skin into the appropriate area as directed  1 (One) Time Per Week for 28 days.  Dispense: 1.5 mL; Refill: 0      Patient's muscle pain improved with stopping Crestor.  Patient does need to be on medication for hyperlipidemia, so Zetia has been prescribed.  Patient advised to call for any adverse side effects.  We will reevaluate lipid panel and CMP at follow-up.  Patient's BMI is 39.74 kg/m².  He does have a history of prediabetes and other comorbidities.  We discussed semaglutide injections.  We discussed potential side effects including diarrhea, pancreatitis, gallstone formation, and thyroid cancer.  Patient aware would like to move forward with semaglutide injections.  Once he receives this medication, he will call to set up appointment with medical assistant for subcutaneous injection instruction.  Patient will call for any adverse side effects.  Given patient's personal and family cardiovascular history, I am hesitant to start on testosterone supplementation.  We will reevaluate at follow-up.       Follow Up   Return in about 6 weeks (around 5/29/2023) for Recheck.  Patient was given instructions and counseling regarding his condition or for health maintenance advice. Please see specific information pulled into the AVS if appropriate.     Electronically signed by ASAEL Hilario, 04/17/23, 9:04 AM EDT.

## 2023-04-17 ENCOUNTER — OFFICE VISIT (OUTPATIENT)
Dept: FAMILY MEDICINE CLINIC | Facility: CLINIC | Age: 68
End: 2023-04-17
Payer: MEDICARE

## 2023-04-17 VITALS
HEART RATE: 81 BPM | HEIGHT: 72 IN | OXYGEN SATURATION: 98 % | SYSTOLIC BLOOD PRESSURE: 126 MMHG | WEIGHT: 293 LBS | BODY MASS INDEX: 39.68 KG/M2 | TEMPERATURE: 97.7 F | DIASTOLIC BLOOD PRESSURE: 76 MMHG

## 2023-04-17 DIAGNOSIS — E78.5 HYPERLIPIDEMIA, UNSPECIFIED HYPERLIPIDEMIA TYPE: Primary | ICD-10-CM

## 2023-04-17 DIAGNOSIS — R73.03 PREDIABETES: ICD-10-CM

## 2023-04-17 DIAGNOSIS — E66.01 CLASS 2 SEVERE OBESITY DUE TO EXCESS CALORIES WITH SERIOUS COMORBIDITY AND BODY MASS INDEX (BMI) OF 39.0 TO 39.9 IN ADULT: ICD-10-CM

## 2023-04-17 RX ORDER — UBIDECARENONE 75 MG
50 CAPSULE ORAL DAILY
COMMUNITY

## 2023-04-17 RX ORDER — EZETIMIBE 10 MG/1
10 TABLET ORAL DAILY
Qty: 90 TABLET | Refills: 0 | Status: SHIPPED | OUTPATIENT
Start: 2023-04-17

## 2023-04-24 ENCOUNTER — CLINICAL SUPPORT (OUTPATIENT)
Dept: FAMILY MEDICINE CLINIC | Facility: CLINIC | Age: 68
End: 2023-04-24
Payer: MEDICARE

## 2023-04-24 NOTE — PROGRESS NOTES
Pt and pt's wife here for ma only appt for instruction on administering Ozempic. Pt and pt's wife educated, Ozempic 0.25 mg administered by pt's wife. Both pt and wife v/u.

## 2023-05-31 NOTE — PROGRESS NOTES
"Chief Complaint  Hyperlipidemia (6W fu - fasting - no longer having muscle aches/pains ) and Obesity (6W fu - no side effects w ozempic )    Subjective        Hoang So presents to [facility]  History of Present Illness  Hoang So is a 67 y.o. male who presents to the clinic today to follow-up on hyperlipidemia and obesity.  Last office visit, patient was started on semaglutide injections.  Patient states he is tolerating the semaglutide injections well.  His wife has been administering the injections for him.  He denies abdominal pain, diarrhea, nausea, vomiting, neck swelling, or trouble swallowing.  Patient had also stopped his Crestor at last office visit due to muscle pain.  His symptoms improved with stopping Crestor.  He was started on Zetia for hyperlipidemia.    Review of Systems   Constitutional: Negative for chills, fatigue and fever.   Gastrointestinal: Negative for abdominal pain, diarrhea, nausea and vomiting.   Musculoskeletal: Negative for myalgias.   Neurological: Negative for dizziness and light-headedness.       Objective   Vital Signs:  /76   Pulse 82   Temp 96.9 °F (36.1 °C)   Ht 182.9 cm (72\")   Wt 132 kg (291 lb)   SpO2 99%   BMI 39.47 kg/m²   Estimated body mass index is 39.47 kg/m² as calculated from the following:    Height as of this encounter: 182.9 cm (72\").    Weight as of this encounter: 132 kg (291 lb).             Physical Exam  Vitals reviewed.   Constitutional:       General: He is not in acute distress.     Appearance: Normal appearance. He is not ill-appearing, toxic-appearing or diaphoretic.   HENT:      Head: Atraumatic.   Pulmonary:      Effort: No respiratory distress.   Abdominal:      General: Bowel sounds are normal. There is no distension.      Palpations: Abdomen is soft.      Tenderness: There is no abdominal tenderness.   Neurological:      General: No focal deficit present.      Mental Status: He is oriented to " person, place, and time.      Motor: No weakness.      Gait: Gait normal.   Psychiatric:         Mood and Affect: Mood normal.         Behavior: Behavior normal.        Result Review :                   Assessment and Plan   Diagnoses and all orders for this visit:    1. Obesity (BMI 30-39.9) (Primary)  -     Ozempic, 0.25 or 0.5 MG/DOSE, 2 MG/3ML solution pen-injector; Inject 0.5 mg under the skin into the appropriate area as directed Every 7 (Seven) Days.  Dispense: 3 mL; Refill: 0  -     Hemoglobin A1c  -     Lipase  -     Lipid Panel  -     Comprehensive metabolic panel    2. Mixed hyperlipidemia  -     Lipid Panel  -     Comprehensive metabolic panel    3. Elevated glucose  -     Hemoglobin A1c      Patient is tolerating Ozempic well.  He has lost 2 pounds.  We will increase Ozempic dose to 0.5 mg subcutaneous injection once weekly.  Will evaluate lab work to ensure he is not having any negative side effects with the medication.  Patient advised to call for any adverse side effects with increasing the dose.  Would like to reevaluate his cholesterol levels as we switched from Crestor to Zetia.         Follow Up   Return in about 3 months (around 9/1/2023).  Patient was given instructions and counseling regarding his condition or for health maintenance advice. Please see specific information pulled into the AVS if appropriate.     Electronically signed by ASAEL Hilario, 06/01/23, 10:55 AM EDT.

## 2023-06-01 ENCOUNTER — OFFICE VISIT (OUTPATIENT)
Dept: FAMILY MEDICINE CLINIC | Facility: CLINIC | Age: 68
End: 2023-06-01

## 2023-06-01 VITALS
OXYGEN SATURATION: 99 % | WEIGHT: 291 LBS | HEART RATE: 82 BPM | DIASTOLIC BLOOD PRESSURE: 76 MMHG | BODY MASS INDEX: 39.42 KG/M2 | TEMPERATURE: 96.9 F | SYSTOLIC BLOOD PRESSURE: 128 MMHG | HEIGHT: 72 IN

## 2023-06-01 DIAGNOSIS — E78.2 MIXED HYPERLIPIDEMIA: ICD-10-CM

## 2023-06-01 DIAGNOSIS — R73.09 ELEVATED GLUCOSE: ICD-10-CM

## 2023-06-01 DIAGNOSIS — E66.9 OBESITY (BMI 30-39.9): Primary | ICD-10-CM

## 2023-06-01 PROCEDURE — 1159F MED LIST DOCD IN RCRD: CPT | Performed by: NURSE PRACTITIONER

## 2023-06-01 PROCEDURE — 3078F DIAST BP <80 MM HG: CPT | Performed by: NURSE PRACTITIONER

## 2023-06-01 PROCEDURE — 1160F RVW MEDS BY RX/DR IN RCRD: CPT | Performed by: NURSE PRACTITIONER

## 2023-06-01 PROCEDURE — 3074F SYST BP LT 130 MM HG: CPT | Performed by: NURSE PRACTITIONER

## 2023-06-01 PROCEDURE — 99214 OFFICE O/P EST MOD 30 MIN: CPT | Performed by: NURSE PRACTITIONER

## 2023-06-01 RX ORDER — SEMAGLUTIDE 0.68 MG/ML
0.25 INJECTION, SOLUTION SUBCUTANEOUS
COMMUNITY
Start: 2023-04-17 | End: 2023-06-01

## 2023-06-01 RX ORDER — SEMAGLUTIDE 0.68 MG/ML
0.5 INJECTION, SOLUTION SUBCUTANEOUS
Qty: 3 ML | Refills: 0 | Status: SHIPPED | OUTPATIENT
Start: 2023-06-01

## 2023-06-01 NOTE — PROGRESS NOTES
No abdominal pain, nausea or vomitting, no trouble swallowing. He does not have the muscle pains he was having

## 2023-06-02 DIAGNOSIS — E78.2 MIXED HYPERLIPIDEMIA: Primary | ICD-10-CM

## 2023-06-02 LAB
ALBUMIN SERPL-MCNC: 4.1 G/DL (ref 3.5–5.2)
ALBUMIN/GLOB SERPL: 1.7 G/DL
ALP SERPL-CCNC: 94 U/L (ref 39–117)
ALT SERPL-CCNC: 20 U/L (ref 1–41)
AST SERPL-CCNC: 17 U/L (ref 1–40)
BILIRUB SERPL-MCNC: 0.6 MG/DL (ref 0–1.2)
BUN SERPL-MCNC: 15 MG/DL (ref 8–23)
BUN/CREAT SERPL: 13.3 (ref 7–25)
CALCIUM SERPL-MCNC: 9.6 MG/DL (ref 8.6–10.5)
CHLORIDE SERPL-SCNC: 105 MMOL/L (ref 98–107)
CHOLEST SERPL-MCNC: 179 MG/DL (ref 0–200)
CO2 SERPL-SCNC: 24 MMOL/L (ref 22–29)
CREAT SERPL-MCNC: 1.13 MG/DL (ref 0.76–1.27)
EGFRCR SERPLBLD CKD-EPI 2021: 70.4 ML/MIN/1.73
GLOBULIN SER CALC-MCNC: 2.4 GM/DL
GLUCOSE SERPL-MCNC: 105 MG/DL (ref 65–99)
HBA1C MFR BLD: 5.7 % (ref 4.8–5.6)
HDLC SERPL-MCNC: 33 MG/DL (ref 40–60)
LDLC SERPL CALC-MCNC: 113 MG/DL (ref 0–100)
LIPASE SERPL-CCNC: 22 U/L (ref 13–60)
POTASSIUM SERPL-SCNC: 4.4 MMOL/L (ref 3.5–5.2)
PROT SERPL-MCNC: 6.5 G/DL (ref 6–8.5)
SODIUM SERPL-SCNC: 141 MMOL/L (ref 136–145)
TRIGL SERPL-MCNC: 187 MG/DL (ref 0–150)
VLDLC SERPL CALC-MCNC: 33 MG/DL (ref 5–40)

## 2023-06-02 RX ORDER — PRAVASTATIN SODIUM 20 MG
20 TABLET ORAL DAILY
Qty: 90 TABLET | Refills: 0 | Status: SHIPPED | OUTPATIENT
Start: 2023-06-02 | End: 2023-06-05 | Stop reason: SDUPTHER

## 2023-06-05 ENCOUNTER — TELEPHONE (OUTPATIENT)
Dept: FAMILY MEDICINE CLINIC | Facility: CLINIC | Age: 68
End: 2023-06-05

## 2023-06-05 ENCOUNTER — TELEPHONE (OUTPATIENT)
Dept: NEUROLOGY | Facility: CLINIC | Age: 68
End: 2023-06-05
Payer: MEDICARE

## 2023-06-05 DIAGNOSIS — E78.2 MIXED HYPERLIPIDEMIA: ICD-10-CM

## 2023-06-05 RX ORDER — PRAVASTATIN SODIUM 20 MG
40 TABLET ORAL DAILY
Qty: 90 TABLET | Refills: 0
Start: 2023-06-05

## 2023-06-05 NOTE — TELEPHONE ENCOUNTER
PATIENT STATES THAT HE RECEIVED A CALL STATING THAT HE NEED TO TAKE ANOTHER CHOLESTEROL MEDICATION. THIS WOULD MAKE TWO AND HE ISN'T COMFORTABLE TO TAKE BOTH, HE WANTS TO KNOW IF THERE IS SOMETHING HE CAN TAKE WHERE HE IS ONLY ON ONE TYPE OF CHOLESTEROL MEDICATION.

## 2023-06-05 NOTE — TELEPHONE ENCOUNTER
Pt informed v/u   Pt is now electing to take both zetia and pravastatin. Pt is aware to only take one tablet of pravastatin if he takes the zetia.

## 2023-06-05 NOTE — TELEPHONE ENCOUNTER
Ok. We could try the pravastatin alone. My only concern is if he would have the same side effects with the pravastatin as he did with his other statin. If he just wants to continue one medication, he can stop the Zetia and only take the pravastatin. However, I would recommend taking 40 mg daily instead of just 20. Please call for any muscle aches or pains.

## 2023-06-10 DIAGNOSIS — E66.9 OBESITY (BMI 30-39.9): ICD-10-CM

## 2023-06-12 RX ORDER — SEMAGLUTIDE 0.68 MG/ML
INJECTION, SOLUTION SUBCUTANEOUS
Qty: 3 ML | Refills: 0 | Status: SHIPPED | OUTPATIENT
Start: 2023-06-12

## 2023-07-22 DIAGNOSIS — E78.5 HYPERLIPIDEMIA, UNSPECIFIED HYPERLIPIDEMIA TYPE: ICD-10-CM

## 2023-07-24 DIAGNOSIS — E78.2 MIXED HYPERLIPIDEMIA: ICD-10-CM

## 2023-07-24 DIAGNOSIS — E78.5 HYPERLIPIDEMIA, UNSPECIFIED HYPERLIPIDEMIA TYPE: ICD-10-CM

## 2023-07-24 RX ORDER — PRAVASTATIN SODIUM 20 MG
20 TABLET ORAL DAILY
Qty: 90 TABLET | Refills: 0 | Status: SHIPPED | OUTPATIENT
Start: 2023-07-24

## 2023-07-24 RX ORDER — EZETIMIBE 10 MG/1
10 TABLET ORAL DAILY
Qty: 90 TABLET | Refills: 0 | Status: SHIPPED | OUTPATIENT
Start: 2023-07-24

## 2023-07-24 RX ORDER — EZETIMIBE 10 MG/1
TABLET ORAL
Qty: 90 TABLET | Refills: 0 | OUTPATIENT
Start: 2023-07-24

## 2023-07-24 RX ORDER — EZETIMIBE 10 MG/1
10 TABLET ORAL DAILY
Qty: 90 TABLET | Refills: 0 | Status: SHIPPED | OUTPATIENT
Start: 2023-07-24 | End: 2023-07-24 | Stop reason: SDUPTHER

## 2023-07-24 RX ORDER — PRAVASTATIN SODIUM 20 MG
20 TABLET ORAL DAILY
Qty: 90 TABLET | Refills: 0
Start: 2023-07-24 | End: 2023-07-24 | Stop reason: SDUPTHER

## 2023-07-24 NOTE — TELEPHONE ENCOUNTER
Please confirm pt's current cholesterol regimen.   Zetia refill was denied. Pt is confused if he is supposed to be taking zetia and pravastatin and if so what mg.

## 2023-08-15 DIAGNOSIS — E66.9 OBESITY (BMI 30-39.9): ICD-10-CM

## 2023-08-15 RX ORDER — SEMAGLUTIDE 1.34 MG/ML
INJECTION, SOLUTION SUBCUTANEOUS
Qty: 3 ML | Refills: 0 | Status: SHIPPED | OUTPATIENT
Start: 2023-08-15

## 2023-09-01 ENCOUNTER — OFFICE VISIT (OUTPATIENT)
Dept: FAMILY MEDICINE CLINIC | Facility: CLINIC | Age: 68
End: 2023-09-01
Payer: MEDICARE

## 2023-09-01 VITALS
BODY MASS INDEX: 38.33 KG/M2 | SYSTOLIC BLOOD PRESSURE: 118 MMHG | WEIGHT: 283 LBS | TEMPERATURE: 98 F | HEIGHT: 72 IN | OXYGEN SATURATION: 98 % | HEART RATE: 76 BPM | DIASTOLIC BLOOD PRESSURE: 74 MMHG

## 2023-09-01 DIAGNOSIS — E66.9 OBESITY (BMI 30-39.9): ICD-10-CM

## 2023-09-01 PROCEDURE — 3074F SYST BP LT 130 MM HG: CPT | Performed by: NURSE PRACTITIONER

## 2023-09-01 PROCEDURE — 1160F RVW MEDS BY RX/DR IN RCRD: CPT | Performed by: NURSE PRACTITIONER

## 2023-09-01 PROCEDURE — 99213 OFFICE O/P EST LOW 20 MIN: CPT | Performed by: NURSE PRACTITIONER

## 2023-09-01 PROCEDURE — 1159F MED LIST DOCD IN RCRD: CPT | Performed by: NURSE PRACTITIONER

## 2023-09-01 PROCEDURE — 3078F DIAST BP <80 MM HG: CPT | Performed by: NURSE PRACTITIONER

## 2023-09-01 NOTE — PROGRESS NOTES
"Chief Complaint  Obesity (3M fu - fasting)    Subjective        Hoang So presents to [facility]  History of Present Illness  Hoang So is a 68 y.o. male who presents to clinic today to follow-up on obesity.  Patient has been on Ozempic 1 mg subcutaneous injection weekly.  Patient's weight in June was 291 pounds. He has cut back on beer.  He denies adverse side effects of Ozempic.  He denies abdominal pain, nausea, vomiting, neck swelling, or trouble swallowing.  He is tolerating the medication well.  Denies myalgias with pravastatin.    Review of Systems   Constitutional:  Negative for chills, fatigue and fever.   Gastrointestinal:  Negative for abdominal pain, constipation, diarrhea, nausea and vomiting.   Musculoskeletal:  Negative for myalgias.     Objective   Vital Signs:  /74   Pulse 76   Temp 98 øF (36.7 øC)   Ht 182.9 cm (72.01\")   Wt 128 kg (283 lb)   SpO2 98%   BMI 38.37 kg/mý   Estimated body mass index is 38.37 kg/mý as calculated from the following:    Height as of this encounter: 182.9 cm (72.01\").    Weight as of this encounter: 128 kg (283 lb).             Physical Exam  Vitals reviewed.   Constitutional:       General: He is not in acute distress.     Appearance: Normal appearance. He is obese. He is not ill-appearing, toxic-appearing or diaphoretic.   HENT:      Head: Normocephalic and atraumatic.   Pulmonary:      Effort: Pulmonary effort is normal. No respiratory distress.   Neurological:      General: No focal deficit present.      Mental Status: He is alert and oriented to person, place, and time.   Psychiatric:         Mood and Affect: Mood normal.         Behavior: Behavior normal.      Result Review :                   Assessment and Plan   Diagnoses and all orders for this visit:    1. Obesity (BMI 30-39.9)  -     Semaglutide, 2 MG/DOSE, (OZEMPIC) 8 MG/3ML solution pen-injector; Inject 2 mg under the skin into the appropriate area as " directed 1 (One) Time Per Week.  Dispense: 3 mL; Refill: 2      Patient has been tolerating semaglutide well.  We will increase dose from 1 mg to 2 mg subcutaneous injection weekly.  Patient advised to call if he experiences any adverse side effects with the medication       Follow Up   Return in about 3 months (around 12/1/2023), or if symptoms worsen or fail to improve, for Medicare Wellness- labs 1 week prior.  Patient was given instructions and counseling regarding his condition or for health maintenance advice. Please see specific information pulled into the AVS if appropriate.     Electronically signed by ASAEL Hilario, 09/01/23, 9:58 AM EDT.

## 2023-09-11 RX ORDER — SEMAGLUTIDE 1.34 MG/ML
INJECTION, SOLUTION SUBCUTANEOUS
Qty: 3 ML | Refills: 0 | Status: SHIPPED | OUTPATIENT
Start: 2023-09-11

## 2023-10-19 ENCOUNTER — PRIOR AUTHORIZATION (OUTPATIENT)
Dept: FAMILY MEDICINE CLINIC | Facility: CLINIC | Age: 68
End: 2023-10-19
Payer: MEDICARE

## 2023-10-19 DIAGNOSIS — E66.01 CLASS 2 SEVERE OBESITY DUE TO EXCESS CALORIES WITH SERIOUS COMORBIDITY AND BODY MASS INDEX (BMI) OF 39.0 TO 39.9 IN ADULT: ICD-10-CM

## 2023-10-19 DIAGNOSIS — E66.9 OBESITY (BMI 30-39.9): Primary | ICD-10-CM

## 2023-10-19 RX ORDER — SEMAGLUTIDE 1.7 MG/.75ML
1.7 INJECTION, SOLUTION SUBCUTANEOUS WEEKLY
Qty: 12 ML | Refills: 0 | Status: SHIPPED | OUTPATIENT
Start: 2023-10-19

## 2023-10-20 DIAGNOSIS — E78.5 HYPERLIPIDEMIA, UNSPECIFIED HYPERLIPIDEMIA TYPE: ICD-10-CM

## 2023-10-20 RX ORDER — EZETIMIBE 10 MG/1
10 TABLET ORAL DAILY
Qty: 90 TABLET | Refills: 0 | Status: SHIPPED | OUTPATIENT
Start: 2023-10-20

## 2023-10-23 ENCOUNTER — TELEPHONE (OUTPATIENT)
Dept: FAMILY MEDICINE CLINIC | Facility: CLINIC | Age: 68
End: 2023-10-23
Payer: MEDICARE

## 2023-10-23 NOTE — TELEPHONE ENCOUNTER
Caller: Hoang So    Relationship: Self    Best call back number: [phone]     What medications are you currently taking:   Current Outpatient Medications on File Prior to Visit   Medication Sig Dispense Refill    acetaminophen (TYLENOL) 500 MG tablet Take 2 tablets by mouth Every 6 (Six) Hours As Needed for Mild Pain. Prn      ezetimibe (ZETIA) 10 MG tablet TAKE 1 TABLET BY MOUTH DAILY 90 tablet 0    metoprolol tartrate (LOPRESSOR) 25 MG tablet Take 0.5 tablets by mouth Every 12 (Twelve) Hours. 90 tablet 3    polyethylene glycol (MIRALAX) 17 g packet Take 17 g by mouth Daily. prn      pravastatin (PRAVACHOL) 20 MG tablet Take 1 tablet by mouth Daily. 90 tablet 0    propafenone (RYTHMOL) 225 MG tablet Take 1 tablet by mouth Every 8 (Eight) Hours. 270 tablet 3    rivaroxaban (Xarelto) 20 MG tablet Take 1 tablet by mouth Daily. 90 tablet 3    Semaglutide-Weight Management (Wegovy) 1.7 MG/0.75ML solution auto-injector Inject 0.75 mL under the skin into the appropriate area as directed 1 (One) Time Per Week. 12 mL 0    vitamin B-12 (CYANOCOBALAMIN) 100 MCG tablet Take 0.5 tablets by mouth Daily.       No current facility-administered medications on file prior to visit.          When did you start taking these medications: 10/19/23    Which medication are you concerned about: Semaglutide-Weight Management (Wegovy) 1.7 MG/0.75ML solution auto-injector     Who prescribed you this medication: ASAEL DENTON    What are your concerns: PATIENT WAS ORIGINALLY TAKING OZEMPIC. WHEN THE PATIENT WENT TO  THIS PRESCRIPTION HE WAS TOLD THAT THE MEDICATION WAS CHANGED, AND THE NEW MEDICATION WILL COST $1400 PER MONTH.    PATIENT DOESN'T UNDERSTAND WHY HIS MEDICATION WAS CHANGED    How long have you had these concerns: 10/19/23

## 2023-10-23 NOTE — TELEPHONE ENCOUNTER
Discussed Ozempic denial due to not being diabetic. Wegovy was sent in as alt. Pt declines to pay out of pocket.

## 2023-11-02 ENCOUNTER — FLU SHOT (OUTPATIENT)
Dept: FAMILY MEDICINE CLINIC | Facility: CLINIC | Age: 68
End: 2023-11-02
Payer: MEDICARE

## 2023-11-02 DIAGNOSIS — Z23 FLU VACCINE NEED: Primary | ICD-10-CM

## 2023-11-02 PROCEDURE — G0008 ADMIN INFLUENZA VIRUS VAC: HCPCS | Performed by: NURSE PRACTITIONER

## 2023-11-02 PROCEDURE — 90662 IIV NO PRSV INCREASED AG IM: CPT | Performed by: NURSE PRACTITIONER

## 2023-11-10 DIAGNOSIS — Z13.228 SCREENING FOR ENDOCRINE, NUTRITIONAL, METABOLIC AND IMMUNITY DISORDER: ICD-10-CM

## 2023-11-10 DIAGNOSIS — E78.2 MIXED HYPERLIPIDEMIA: Primary | ICD-10-CM

## 2023-11-10 DIAGNOSIS — R73.03 PREDIABETES: ICD-10-CM

## 2023-11-10 DIAGNOSIS — Z13.0 SCREENING FOR ENDOCRINE, NUTRITIONAL, METABOLIC AND IMMUNITY DISORDER: ICD-10-CM

## 2023-11-10 DIAGNOSIS — Z13.0 SCREENING FOR IRON DEFICIENCY ANEMIA: ICD-10-CM

## 2023-11-10 DIAGNOSIS — R73.09 ELEVATED GLUCOSE: ICD-10-CM

## 2023-11-10 DIAGNOSIS — Z13.21 SCREENING FOR ENDOCRINE, NUTRITIONAL, METABOLIC AND IMMUNITY DISORDER: ICD-10-CM

## 2023-11-10 DIAGNOSIS — Z11.59 NEED FOR HEPATITIS C SCREENING TEST: ICD-10-CM

## 2023-11-10 DIAGNOSIS — Z13.29 SCREENING FOR THYROID DISORDER: ICD-10-CM

## 2023-11-10 DIAGNOSIS — Z13.29 SCREENING FOR ENDOCRINE, NUTRITIONAL, METABOLIC AND IMMUNITY DISORDER: ICD-10-CM

## 2023-11-26 DIAGNOSIS — I48.0 PAROXYSMAL ATRIAL FIBRILLATION: ICD-10-CM

## 2023-11-27 RX ORDER — PROPAFENONE HYDROCHLORIDE 225 MG/1
225 TABLET, FILM COATED ORAL EVERY 8 HOURS
Qty: 270 TABLET | Refills: 3 | Status: SHIPPED | OUTPATIENT
Start: 2023-11-27

## 2023-11-27 NOTE — TELEPHONE ENCOUNTER
Rx Refill Note  Requested Prescriptions     Pending Prescriptions Disp Refills    propafenone (RYTHMOL) 225 MG tablet [Pharmacy Med Name: PROPAFENONE  MG TAB] 270 tablet 3     Sig: TAKE ONE TABLET BY MOUTH EVERY 8 HOURS      Last office visit with prescribing clinician: 12/1/2022   Last telemedicine visit with prescribing clinician: Visit date not found   Next office visit with prescribing clinician: 12/7/2023                         Would you like a call back once the refill request has been completed: [] Yes [] No    If the office needs to give you a call back, can they leave a voicemail: [] Yes [] No    Jonny Cardenas MA  11/27/23, 08:54 EST

## 2023-12-04 DIAGNOSIS — E78.2 MIXED HYPERLIPIDEMIA: ICD-10-CM

## 2023-12-04 RX ORDER — PRAVASTATIN SODIUM 20 MG
20 TABLET ORAL DAILY
Qty: 90 TABLET | Refills: 0 | Status: SHIPPED | OUTPATIENT
Start: 2023-12-04

## 2023-12-07 ENCOUNTER — OFFICE VISIT (OUTPATIENT)
Dept: CARDIOLOGY | Facility: CLINIC | Age: 68
End: 2023-12-07
Payer: MEDICARE

## 2023-12-07 VITALS
HEIGHT: 72 IN | RESPIRATION RATE: 18 BRPM | OXYGEN SATURATION: 98 % | DIASTOLIC BLOOD PRESSURE: 74 MMHG | WEIGHT: 287 LBS | BODY MASS INDEX: 38.87 KG/M2 | SYSTOLIC BLOOD PRESSURE: 118 MMHG | HEART RATE: 72 BPM

## 2023-12-07 DIAGNOSIS — I48.0 PAROXYSMAL ATRIAL FIBRILLATION: ICD-10-CM

## 2023-12-07 DIAGNOSIS — I10 HYPERTENSION, UNSPECIFIED TYPE: ICD-10-CM

## 2023-12-07 DIAGNOSIS — Z79.01 LONG TERM (CURRENT) USE OF ANTICOAGULANTS: ICD-10-CM

## 2023-12-07 DIAGNOSIS — E78.2 MIXED HYPERLIPIDEMIA: ICD-10-CM

## 2023-12-07 DIAGNOSIS — I48.19 PERSISTENT ATRIAL FIBRILLATION: ICD-10-CM

## 2023-12-07 DIAGNOSIS — I10 BENIGN ESSENTIAL HYPERTENSION: Primary | ICD-10-CM

## 2023-12-07 RX ORDER — PROPAFENONE HYDROCHLORIDE 225 MG/1
225 TABLET, FILM COATED ORAL EVERY 8 HOURS
Qty: 270 TABLET | Refills: 3 | Status: SHIPPED | OUTPATIENT
Start: 2023-12-07

## 2023-12-07 NOTE — PROGRESS NOTES
CARDIOLOGY    Niki Aguillon MD    ENCOUNTER DATE:  12/07/2023    Hoang So / 68 y.o. / male        CHIEF COMPLAINT / REASON FOR OFFICE VISIT     Heart Problem (Yearly follow up:  Persistent Atrial fib, paroxsymal Atrial fib, Hypertension, Mixed HLD, SULLY )      HISTORY OF PRESENT ILLNESS       HPI    Hoang So is a 68 y.o. male     This is a nice gentleman who presented to the cardiac evaluation clinic in 10/2017.  He complained of dizziness and shortness of breath.  He was found to be having paroxysms of atrial fibrillation.  He was given IV Lasix and IV diltiazem as well as IV metoprolol.  He had an echocardiogram which revealed asymmetric left ventricular hypertrophy, normal LV systolic function, and no significant valvular heart disease.  He was discharged on oral diltiazem.  He had recurrent, symptomatic atrial fibrillation and was started on propafenone and Xarelto.  He had a treadmill stress test in December 2017 which did not show any evidence of ischemia.    He is doing well.  He plays golf and tries to stay active.  He denies chest pain, shortness of breath, palpitations or bleeding issues though he does have some easy bruising.    REVIEW OF SYSTEMS     Review of Systems   Constitutional: Negative for chills, fever, weight gain and weight loss.   Cardiovascular:  Negative for leg swelling.   Respiratory:  Negative for cough, snoring and wheezing.    Hematologic/Lymphatic: Negative for bleeding problem. Bruises/bleeds easily.   Skin:  Negative for color change.   Musculoskeletal:  Negative for falls, joint pain and myalgias.   Gastrointestinal:  Negative for melena.   Genitourinary:  Negative for hematuria.   Neurological:  Negative for excessive daytime sleepiness.   Psychiatric/Behavioral:  Negative for depression. The patient is not nervous/anxious.          VITAL SIGNS     Visit Vitals  /74 (BP Location: Left arm, Patient Position: Sitting, Cuff Size: Adult)   Pulse 72   Resp 18  "  Ht 182.9 cm (72\")   Wt 130 kg (287 lb)   SpO2 98%   BMI 38.92 kg/m²         Wt Readings from Last 3 Encounters:   12/07/23 130 kg (287 lb)   09/01/23 128 kg (283 lb)   06/01/23 132 kg (291 lb)     Body mass index is 38.92 kg/m².      PHYSICAL EXAMINATION     Constitutional:       General: Not in acute distress.  Neck:      Vascular: No carotid bruit or JVD.   Pulmonary:      Effort: Pulmonary effort is normal.      Breath sounds: Normal breath sounds.   Cardiovascular:      Normal rate. Regular rhythm.      Murmurs: There is no murmur.   Psychiatric:         Mood and Affect: Mood and affect normal.           REVIEWED DATA       ECG 12 Lead    Date/Time: 12/7/2023 9:18 AM  Performed by: Niki Aguillon MD    Authorized by: Niki Aguillon MD  Comparison: compared with previous ECG from 3/17/2023  Similar to previous ECG  Rhythm: sinus rhythm  BPM: 72  Conduction: conduction normal  ST Segments: ST segments normal  T Waves: T waves normal    Clinical impression: normal ECG            Lipid Panel          6/1/2023    09:54   Lipid Panel   Total Cholesterol 179    Triglycerides 187    HDL Cholesterol 33    VLDL Cholesterol 33    LDL Cholesterol  113        Lab Results   Component Value Date    GLUCOSE 105 (H) 06/01/2023    BUN 15 06/01/2023    CREATININE 1.13 06/01/2023    EGFRRESULT 70.4 06/01/2023    BCR 13.3 06/01/2023    K 4.4 06/01/2023    CO2 24.0 06/01/2023    CALCIUM 9.6 06/01/2023    PROTENTOTREF 6.5 06/01/2023    ALBUMIN 4.1 06/01/2023    BILITOT 0.6 06/01/2023    AST 17 06/01/2023    ALT 20 06/01/2023       ASSESSMENT & PLAN      Diagnosis Plan   1. Benign essential hypertension        2. Paroxysmal atrial fibrillation  rivaroxaban (Xarelto) 20 MG tablet    propafenone (RYTHMOL) 225 MG tablet      3. Hypertension, unspecified type  metoprolol tartrate (LOPRESSOR) 25 MG tablet      4. Mixed hyperlipidemia        5. Persistent atrial fibrillation        6. Long term (current) use of anticoagulants      "       1.  Persistent atrial fibrillation.  He has done well on propafenone and not had recurrent episodes of A-fib.  He is on rivaroxaban.  He follows with Dr. Lee and Rajani.  2.  Hypertension.  Blood pressure is well controlled.  3.  Hyperlipidemia.  I reviewed his most recent lipid panel as above.  He is on rosuvastatin and Zetia.  4.  Obstructive sleep apnea.  On CPAP  5.  Obesity.  I encouraged regular exercise.  He was having success on Wegovy and was losing weight but his insurance stopped paying for it.    He has follow-up with Rajani in March.  I encouraged him to keep that appointment.  I will see him back in a year.    Orders Placed This Encounter   Procedures    ECG 12 Lead     This order was created via procedure documentation     Order Specific Question:   Release to patient     Answer:   Routine Release [[id number]]           MEDICATIONS         Discharge Medications            Accurate as of December 7, 2023  9:20 AM. If you have any questions, ask your nurse or doctor.                Continue These Medications        Instructions Start Date   acetaminophen 500 MG tablet  Commonly known as: TYLENOL   1,000 mg, Oral, Every 6 Hours PRN, Prn      ezetimibe 10 MG tablet  Commonly known as: ZETIA   10 mg, Oral, Daily      metoprolol tartrate 25 MG tablet  Commonly known as: LOPRESSOR   12.5 mg, Oral, Every 12 Hours      polyethylene glycol 17 g packet  Commonly known as: MIRALAX   17 g, Oral, Daily, prn      pravastatin 20 MG tablet  Commonly known as: PRAVACHOL   20 mg, Oral, Daily      propafenone 225 MG tablet  Commonly known as: RYTHMOL   225 mg, Oral, Every 8 Hours      rivaroxaban 20 MG tablet  Commonly known as: Xarelto   20 mg, Oral, Daily      vitamin B-12 100 MCG tablet  Commonly known as: CYANOCOBALAMIN   50 mcg, Oral, Daily                 Niki Aguillon MD  12/07/23  09:20 EST    Part of this note may be an electronic transcription/translation of spoken language to printed text using  the Dragon dictation system.

## 2023-12-18 NOTE — PROGRESS NOTES
"The ABCs of the Annual Wellness Visit  Subsequent Medicare Wellness Visit    Subjective    {Wrapup  Review (Popup)  Advance Care Planning  Labs  CC  Problem List  Visit Diagnosis  Medications  Result Review  Imaging  [facility]  [facility]  [facility]  SnapShot  Encounters  Notes  Media  Procedures :23}  Hoang So is a 68 y.o. male who presents for a Subsequent Medicare Wellness Visit.    The following portions of the patient's history were reviewed and   updated as appropriate: {history reviewed:20406::\"allergies\",\"current medications\",\"past family history\",\"past medical history\",\"past social history\",\"past surgical history\",\"problem list\"}.    Compared to one year ago, the patient feels his physical   health is {better worse same:52415}.    Compared to one year ago, the patient feels his mental   health is {better worse same:03687}.    Review of Systems  Recent Hospitalizations:  {Hospital Admission Status in the last 365 days:77068}      Current Medical Providers:  Patient Care Team:  Piper Roman APRN as PCP - General (Nurse Practitioner)  Dima Vallejo MD as Consulting Physician (Gastroenterology)  Andrey Hope MD as Consulting Physician (Neurosurgery)    Outpatient Medications Prior to Visit   Medication Sig Dispense Refill    acetaminophen (TYLENOL) 500 MG tablet Take 2 tablets by mouth Every 6 (Six) Hours As Needed for Mild Pain. Prn      ezetimibe (ZETIA) 10 MG tablet TAKE 1 TABLET BY MOUTH DAILY 90 tablet 0    metoprolol tartrate (LOPRESSOR) 25 MG tablet Take 0.5 tablets by mouth Every 12 (Twelve) Hours. 90 tablet 3    polyethylene glycol (MIRALAX) 17 g packet Take 17 g by mouth Daily. prn      pravastatin (PRAVACHOL) 20 MG tablet TAKE 1 TABLET BY MOUTH DAILY 90 tablet 0    propafenone (RYTHMOL) 225 MG tablet Take 1 tablet by mouth Every 8 (Eight) Hours. 270 tablet 3    rivaroxaban (Xarelto) 20 MG tablet Take 1 tablet by mouth Daily. 90 tablet 3    " "vitamin B-12 (CYANOCOBALAMIN) 100 MCG tablet Take 0.5 tablets by mouth Daily.       No facility-administered medications prior to visit.       No opioid medication identified on active medication list. I have reviewed chart for other potential  high risk medication/s and harmful drug interactions in the elderly.        Aspirin is not on active medication list.  {ASPIRIN NOT ON MEDICATION LIST INDICATED/NOT INDICATED:35023}.    Patient Active Problem List   Diagnosis    Mixed hyperlipidemia    Low back pain    Leg pain, bilateral    Snoring    Benign essential hypertension    Cervical radiculopathy    Cervical stenosis of spine    Lumbar radiculopathy    SULLY (obstructive sleep apnea)    Hypersomnia    Persistent atrial fibrillation    Obesity (BMI 30-39.9)    Anal fistula    Screening for iron deficiency anemia    Special screening for malignant neoplasm of prostate    Trigger ring finger of left hand    Morbid obesity    Weight gain    Flu vaccine need    Carpal tunnel syndrome of right wrist    Bilateral carpal tunnel syndrome    Long term (current) use of anticoagulants     Advance Care Planning   Advance Care Planning     Advance Directive is not on file.  {ACP Discussion, Advance Directive not in EMR:10359}     Objective    There were no vitals filed for this visit.  Estimated body mass index is 38.92 kg/m² as calculated from the following:    Height as of 12/7/23: 182.9 cm (72\").    Weight as of 12/7/23: 130 kg (287 lb).    {Class 2 Severe Obesity (BMI >=35 and <=39.9.:7754853206}      Does the patient have evidence of cognitive impairment?   {Yes/No:65874}    Lab Results   Component Value Date    CHLPL 132 12/07/2023    TRIG 139 12/07/2023    HDL 35 (L) 12/07/2023    LDL 72 12/07/2023    VLDL 25 12/07/2023    HGBA1C 5.50 12/07/2023          HEALTH RISK ASSESSMENT    Smoking Status:  Social History     Tobacco Use   Smoking Status Former    Packs/day: 0.25    Years: 10.00    Additional pack years: 0.00    Total " pack years: 2.50    Types: Cigarettes    Quit date: 2008    Years since quitting: 15.9   Smokeless Tobacco Never   Tobacco Comments    daily caffiene     Alcohol Consumption:  Social History     Substance and Sexual Activity   Alcohol Use Yes    Comment: 2-3 times/week      Fall Risk Screen:    LATIA Fall Risk Assessment has not been completed.    Depression Screening:       No data to display                Health Habits and Functional and Cognitive Screenin/9/2023     9:56 AM   Functional & Cognitive Status   Do you have difficulty preparing food and eating? No   Do you have difficulty bathing yourself, getting dressed or grooming yourself? No       Age-appropriate Screening Schedule:  Refer to the list below for future screening recommendations based on patient's age, sex and/or medical conditions. Orders for these recommended tests are listed in the plan section. The patient has been provided with a written plan.    Health Maintenance   Topic Date Due    TDAP/TD VACCINES (1 - Tdap) Never done    ZOSTER VACCINE (1 of 2) Never done    AAA SCREEN (ONE-TIME)  Never done    ANNUAL WELLNESS VISIT  2021    BMI FOLLOWUP  2023    COVID-19 Vaccine (2023- season) 2023    LIPID PANEL  2024    COLORECTAL CANCER SCREENING  09/10/2030    HEPATITIS C SCREENING  Completed    INFLUENZA VACCINE  Completed    Pneumococcal Vaccine 65+  Completed                  CMS Preventative Services Quick Reference  Risk Factors Identified During Encounter:    {Medicare Wellness Risk Factors:69057}    The above risks/problems have been discussed with the patient.  Pertinent information has been shared with the patient in the After Visit Summary.          There are no diagnoses linked to this encounter.    Follow Up:   Next Medicare Wellness visit to be scheduled in 1 year.      An After Visit Summary and PPPS were made available to the patient.

## 2023-12-20 ENCOUNTER — OFFICE VISIT (OUTPATIENT)
Dept: FAMILY MEDICINE CLINIC | Facility: CLINIC | Age: 68
End: 2023-12-20
Payer: MEDICARE

## 2023-12-20 VITALS
WEIGHT: 284 LBS | DIASTOLIC BLOOD PRESSURE: 74 MMHG | BODY MASS INDEX: 38.47 KG/M2 | SYSTOLIC BLOOD PRESSURE: 116 MMHG | TEMPERATURE: 98 F | OXYGEN SATURATION: 98 % | HEIGHT: 72 IN | HEART RATE: 84 BPM

## 2023-12-20 DIAGNOSIS — M54.12 CERVICAL RADICULOPATHY: ICD-10-CM

## 2023-12-20 DIAGNOSIS — Z13.6 SCREENING FOR AAA (ABDOMINAL AORTIC ANEURYSM): ICD-10-CM

## 2023-12-20 DIAGNOSIS — M54.2 CERVICAL SPINE PAIN: ICD-10-CM

## 2023-12-20 DIAGNOSIS — E66.9 OBESITY (BMI 30-39.9): ICD-10-CM

## 2023-12-20 DIAGNOSIS — E66.01 CLASS 2 SEVERE OBESITY WITH SERIOUS COMORBIDITY AND BODY MASS INDEX (BMI) OF 38.0 TO 38.9 IN ADULT, UNSPECIFIED OBESITY TYPE: ICD-10-CM

## 2023-12-20 DIAGNOSIS — G62.9 PERIPHERAL POLYNEUROPATHY: ICD-10-CM

## 2023-12-20 DIAGNOSIS — Z00.00 MEDICARE ANNUAL WELLNESS VISIT, SUBSEQUENT: Primary | ICD-10-CM

## 2023-12-20 NOTE — PROGRESS NOTES
The ABCs of the Annual Wellness Visit  Subsequent Medicare Wellness Visit    Subjective    Hoang So is a 68 y.o. male who presents for a Subsequent Medicare Wellness Visit.  He has a history of atrial fibrillation, hyperlipidemia, hypertension, sleep apnea, and obesity. He does have complaints today.     The following portions of the patient's history were reviewed and   updated as appropriate: allergies, current medications, past family history, past medical history, past social history, past surgical history, and problem list.    Compared to one year ago, the patient feels his physical   health is the same.    Compared to one year ago, the patient feels his mental   health is the same.    Recent Hospitalizations:  He was not admitted to the hospital during the last year.       Current Medical Providers:  Patient Care Team:  Piper Roman APRN as PCP - General (Nurse Practitioner)  Dima Vallejo MD as Consulting Physician (Gastroenterology)  Andrey Hope MD as Consulting Physician (Neurosurgery)    Outpatient Medications Prior to Visit   Medication Sig Dispense Refill    acetaminophen (TYLENOL) 500 MG tablet Take 2 tablets by mouth Every 6 (Six) Hours As Needed for Mild Pain. Prn      ezetimibe (ZETIA) 10 MG tablet TAKE 1 TABLET BY MOUTH DAILY 90 tablet 0    metoprolol tartrate (LOPRESSOR) 25 MG tablet Take 0.5 tablets by mouth Every 12 (Twelve) Hours. 90 tablet 3    polyethylene glycol (MIRALAX) 17 g packet Take 17 g by mouth Daily. prn      pravastatin (PRAVACHOL) 20 MG tablet TAKE 1 TABLET BY MOUTH DAILY 90 tablet 0    propafenone (RYTHMOL) 225 MG tablet Take 1 tablet by mouth Every 8 (Eight) Hours. 270 tablet 3    rivaroxaban (Xarelto) 20 MG tablet Take 1 tablet by mouth Daily. 90 tablet 3    vitamin B-12 (CYANOCOBALAMIN) 100 MCG tablet Take 0.5 tablets by mouth Daily.       No facility-administered medications prior to visit.       No opioid medication identified on active medication  "list. I have reviewed chart for other potential  high risk medication/s and harmful drug interactions in the elderly.        Aspirin is not on active medication list.  Aspirin use is not indicated based on review of current medical condition/s. Risk of harm outweighs potential benefits.  .    Patient Active Problem List   Diagnosis    Mixed hyperlipidemia    Low back pain    Leg pain, bilateral    Snoring    Benign essential hypertension    Cervical radiculopathy    Cervical stenosis of spine    Lumbar radiculopathy    SULLY (obstructive sleep apnea)    Hypersomnia    Persistent atrial fibrillation    Obesity (BMI 30-39.9)    Anal fistula    Screening for iron deficiency anemia    Special screening for malignant neoplasm of prostate    Trigger ring finger of left hand    Morbid obesity    Weight gain    Flu vaccine need    Carpal tunnel syndrome of right wrist    Bilateral carpal tunnel syndrome    Long term (current) use of anticoagulants     Advance Care Planning   Advance Care Planning     Advance Directive is not on file.  ACP discussion was held with the patient during this visit. Patient does not have an advance directive, declines further assistance.     Objective    Vitals:    12/20/23 0956   BP: 116/74   Pulse: 84   Temp: 98 °F (36.7 °C)   SpO2: 98%   Weight: 129 kg (284 lb)   Height: 182.9 cm (72.01\")     Estimated body mass index is 38.51 kg/m² as calculated from the following:    Height as of this encounter: 182.9 cm (72.01\").    Weight as of this encounter: 129 kg (284 lb).    Class 2 Severe Obesity (BMI >=35 and <=39.9). Obesity-related health conditions include the following: obstructive sleep apnea, hypertension, and dyslipidemias. Obesity is worsening. BMI is is above average; BMI management plan is completed. We discussed portion control, increasing exercise, and pharmacologic options including GLP-1 medications .      Does the patient have evidence of cognitive impairment? No    Lab Results "   Component Value Date    CHLPL 132 2023    TRIG 139 2023    HDL 35 (L) 2023    LDL 72 2023    VLDL 25 2023    HGBA1C 5.50 2023        HEALTH RISK ASSESSMENT    Smoking Status:  Social History     Tobacco Use   Smoking Status Former    Packs/day: 0.25    Years: 10.00    Additional pack years: 0.00    Total pack years: 2.50    Types: Cigarettes    Quit date: 2008    Years since quitting: 15.9   Smokeless Tobacco Never   Tobacco Comments    daily caffiene     Alcohol Consumption:  Social History     Substance and Sexual Activity   Alcohol Use Yes    Comment: 2-3 times/week      Fall Risk Screen:    LATIA Fall Risk Assessment was completed, and patient is at LOW risk for falls.Assessment completed on:2023    Depression Screenin/20/2023    10:06 AM   PHQ-2/PHQ-9 Depression Screening   Little Interest or Pleasure in Doing Things 0-->not at all   Feeling Down, Depressed or Hopeless 0-->not at all   PHQ-9: Brief Depression Severity Measure Score 0       Health Habits and Functional and Cognitive Screenin/20/2023    10:04 AM   Functional & Cognitive Status   Do you have difficulty preparing food and eating? No   Do you have difficulty bathing yourself, getting dressed or grooming yourself? No   Do you have difficulty using the toilet? No   Do you have difficulty moving around from place to place? No   Do you have trouble with steps or getting out of a bed or a chair? No   Current Diet Unhealthy Diet   Dental Exam Up to date   Eye Exam Up to date   Exercise (times per week) 1 times per week   Current Exercises Include Walking;Other        Exercise Comment golf   Do you need help using the phone?  No   Are you deaf or do you have serious difficulty hearing?  Yes   Do you need help to go to places out of walking distance? No   Do you need help shopping? No   Do you need help preparing meals?  No   Do you need help with housework?  No   Do you need help with  laundry? No   Do you need help taking your medications? No   Do you need help managing money? No   Do you ever drive or ride in a car without wearing a seat belt? No   Have you felt unusual stress, anger or loneliness in the last month? No   Who do you live with? Spouse   If you need help, do you have trouble finding someone available to you? No   Have you been bothered in the last four weeks by sexual problems? No   Do you have difficulty concentrating, remembering or making decisions? No       Age-appropriate Screening Schedule:  Refer to the list below for future screening recommendations based on patient's age, sex and/or medical conditions. Orders for these recommended tests are listed in the plan section. The patient has been provided with a written plan.    Health Maintenance   Topic Date Due    TDAP/TD VACCINES (1 - Tdap) Never done    ZOSTER VACCINE (1 of 2) Never done    AAA SCREEN (ONE-TIME)  Never done    COVID-19 Vaccine (4 - 2023-24 season) 09/01/2023    LIPID PANEL  12/07/2024    ANNUAL WELLNESS VISIT  12/20/2024    BMI FOLLOWUP  12/20/2024    COLORECTAL CANCER SCREENING  09/10/2030    HEPATITIS C SCREENING  Completed    INFLUENZA VACCINE  Completed    Pneumococcal Vaccine 65+  Completed                  CMS Preventative Services Quick Reference  Risk Factors Identified During Encounter  Chronic Pain:  Following with neurosurgery and PT.  Immunizations Discussed/Encouraged: Tdap, Shingrix, and COVID19 booster.   The above risks/problems have been discussed with the patient.  Pertinent information has been shared with the patient in the After Visit Summary.  An After Visit Summary and PPPS were made available to the patient.    Health maintenance screenings:   Colon cancer screening: Colonoscopy last performed September 2020.  Repeat in 5 years recommended.  Prostate cancer screening: Last performed January 2022.  Will repeat with next round of lab work.  Immunizations: Due for TDAP, Shingrix, and  "COVID-19 booster.      Follow Up:   Next Medicare Wellness visit to be scheduled in 1 year.       Additional E&M Note during same encounter follows:  Patient has multiple medical problems which are significant and separately identifiable that require additional work above and beyond the Medicare Wellness Visit.      Chief Complaint  Medicare Wellness-subsequent ([facility] - labs 12/7/23 - AUGUSTO 0 PHQ 0), Neck Pain (Cervical fusion in the 1999. Worsening neck pain in last several months, currently in PT as referred by neurosurgeon. Pt was told he needs updated MRI. Swelling above L clavicle. Pt rates pain 7/10), and Peripheral Neuropathy (Pt was evaluated by  nurse recently as was told he has neuropathy in feet)    Subjective        HPI  Hoang So is also being seen today for complaints of worsening neck pain.  He was referred to physical therapy by his neurosurgeon.  He does have a history of cervical neck fusion in 2000.  He had an epidural 10 years ago and had not had issues until recently.  Physical therapy recommended MRI before continuing physical therapy.  He does have presence of cervical radiculopathy down his arms.  He also was evaluated by a home health insurance nurse who indicated he has peripheral neuropathy in his feet.  He does report occasional numbness in his feet for 6 months to year and notes improvement with walking.  He denies new low back pain, urinary incontinence, or saddle paresthesia.    Review of Systems   Constitutional:  Negative for chills, fatigue and fever.   Musculoskeletal:  Positive for neck pain. Negative for back pain.   Neurological:  Positive for numbness. Negative for dizziness and light-headedness.       Objective   Vital Signs:  /74   Pulse 84   Temp 98 °F (36.7 °C)   Ht 182.9 cm (72.01\")   Wt 129 kg (284 lb)   SpO2 98%   BMI 38.51 kg/m²     Physical Exam  Vitals reviewed.   Constitutional:       General: He is not in acute distress.     Appearance: Normal " appearance. He is not ill-appearing, toxic-appearing or diaphoretic.   HENT:      Head: Atraumatic.   Eyes:      General: No scleral icterus.        Right eye: No discharge.         Left eye: No discharge.      Extraocular Movements: Extraocular movements intact.   Cardiovascular:      Rate and Rhythm: Normal rate and regular rhythm.   Pulmonary:      Effort: Pulmonary effort is normal. No respiratory distress.   Musculoskeletal:      Cervical back: Tenderness present.   Neurological:      General: No focal deficit present.      Mental Status: He is alert and oriented to person, place, and time.      Motor: No weakness.      Gait: Gait normal.   Psychiatric:         Mood and Affect: Mood normal.         Behavior: Behavior normal.                    PROGRESS NOTES - SCAN - OFFICE NOTE, [facility], 12/7/2023 (12/07/2023)      Assessment and Plan   Diagnoses and all orders for this visit:    1. Medicare annual wellness visit, subsequent (Primary)    2. Cervical spine pain  -     MRI Cervical Spine Without Contrast; Future    3. Cervical radiculopathy  -     MRI Cervical Spine Without Contrast; Future    4. Obesity (BMI 30-39.9)    5. Class 2 severe obesity with serious comorbidity and body mass index (BMI) of 38.0 to 38.9 in adult, unspecified obesity type  -     Semaglutide-Weight Management 0.25 MG/0.5ML solution auto-injector; Inject 0.25 mg under the skin into the appropriate area as directed 1 (One) Time Per Week.  Dispense: 0.5 mL; Refill: 0    6. Screening for AAA (abdominal aortic aneurysm)  -     [place] aaa screen limited; Future    7. Peripheral polyneuropathy  -     Ambulatory Referral to Neurology      Due to patient's history of cervical spine fusion in 2000 and persistence of cervical radiculopathy and decreased range of motion in cervical spine, recommended we proceed with MRI.  Patient does report presence of peripheral neuropathy.  Reviewed lab work.  Hemoglobin A1c is actually improved and does not indicate  prediabetes.  However, he has not been able to receive his semaglutide injections for 1 month.  Due to his chronic orthopedic issues and developing peripheral neuropathy over the last 6 months to a year, patient has been referred to neurology for evaluation.       Follow Up   Return in about 3 months (around 3/20/2024) for Recheck.  Patient was given instructions and counseling regarding his condition or for health maintenance advice. Please see specific information pulled into the AVS if appropriate.     Electronically signed by ASAEL Hilario, 12/27/23, 7:59 AM EST.

## 2023-12-20 NOTE — PROGRESS NOTES
"The ABCs of the Annual Wellness Visit  Subsequent Medicare Wellness Visit    Subjective    Hoang So is a 68 y.o. male who presents for a Subsequent Medicare Wellness Visit.    The following portions of the patient's history were reviewed and   updated as appropriate: {history reviewed:20406::\"allergies\",\"current medications\",\"past family history\",\"past medical history\",\"past social history\",\"past surgical history\",\"problem list\"}.    Compared to one year ago, the patient feels his physical   health is {better worse same:42499}.    Compared to one year ago, the patient feels his mental   health is {better worse same:18820}.    Recent Hospitalizations:  {Hospital Admission Status in the last 365 days:31535}      Current Medical Providers:  Patient Care Team:  Piper Roman APRN as PCP - General (Nurse Practitioner)  Dima Vallejo MD as Consulting Physician (Gastroenterology)  Andrey Hope MD as Consulting Physician (Neurosurgery)    Outpatient Medications Prior to Visit   Medication Sig Dispense Refill    acetaminophen (TYLENOL) 500 MG tablet Take 2 tablets by mouth Every 6 (Six) Hours As Needed for Mild Pain. Prn      ezetimibe (ZETIA) 10 MG tablet TAKE 1 TABLET BY MOUTH DAILY 90 tablet 0    metoprolol tartrate (LOPRESSOR) 25 MG tablet Take 0.5 tablets by mouth Every 12 (Twelve) Hours. 90 tablet 3    polyethylene glycol (MIRALAX) 17 g packet Take 17 g by mouth Daily. prn      pravastatin (PRAVACHOL) 20 MG tablet TAKE 1 TABLET BY MOUTH DAILY 90 tablet 0    propafenone (RYTHMOL) 225 MG tablet Take 1 tablet by mouth Every 8 (Eight) Hours. 270 tablet 3    rivaroxaban (Xarelto) 20 MG tablet Take 1 tablet by mouth Daily. 90 tablet 3    vitamin B-12 (CYANOCOBALAMIN) 100 MCG tablet Take 0.5 tablets by mouth Daily.       No facility-administered medications prior to visit.       No opioid medication identified on active medication list. I have reviewed chart for other potential  high risk medication/s " "and harmful drug interactions in the elderly.        Aspirin is not on active medication list.  {ASPIRIN NOT ON MEDICATION LIST INDICATED/NOT INDICATED:83211}.    Patient Active Problem List   Diagnosis    Mixed hyperlipidemia    Low back pain    Leg pain, bilateral    Snoring    Benign essential hypertension    Cervical radiculopathy    Cervical stenosis of spine    Lumbar radiculopathy    SULLY (obstructive sleep apnea)    Hypersomnia    Persistent atrial fibrillation    Obesity (BMI 30-39.9)    Anal fistula    Screening for iron deficiency anemia    Special screening for malignant neoplasm of prostate    Trigger ring finger of left hand    Morbid obesity    Weight gain    Flu vaccine need    Carpal tunnel syndrome of right wrist    Bilateral carpal tunnel syndrome    Long term (current) use of anticoagulants     Advance Care Planning   Advance Care Planning     Advance Directive is not on file.  {ACP Discussion, Advance Directive not in EMR:51520}     Objective    Vitals:    12/20/23 0956   BP: 116/74   Pulse: 84   Temp: 98 °F (36.7 °C)   SpO2: 98%   Weight: 129 kg (284 lb)   Height: 182.9 cm (72.01\")     Estimated body mass index is 38.51 kg/m² as calculated from the following:    Height as of this encounter: 182.9 cm (72.01\").    Weight as of this encounter: 129 kg (284 lb).    {Class 2 Severe Obesity (BMI >=35 and <=39.9.:0681365844}      Does the patient have evidence of cognitive impairment? {Yes/No:42120}    Lab Results   Component Value Date    CHLPL 132 12/07/2023    TRIG 139 12/07/2023    HDL 35 (L) 12/07/2023    LDL 72 12/07/2023    VLDL 25 12/07/2023    HGBA1C 5.50 12/07/2023        HEALTH RISK ASSESSMENT    Smoking Status:  Social History     Tobacco Use   Smoking Status Former    Packs/day: 0.25    Years: 10.00    Additional pack years: 0.00    Total pack years: 2.50    Types: Cigarettes    Quit date: 1/1/2008    Years since quitting: 15.9   Smokeless Tobacco Never   Tobacco Comments    daily caffiene "     Alcohol Consumption:  Social History     Substance and Sexual Activity   Alcohol Use Yes    Comment: 2-3 times/week      Fall Risk Screen:    LATIA Fall Risk Assessment was completed, and patient is at LOW risk for falls.Assessment completed on:2023    Depression Screenin/20/2023    10:06 AM   PHQ-2/PHQ-9 Depression Screening   Little Interest or Pleasure in Doing Things 0-->not at all   Feeling Down, Depressed or Hopeless 0-->not at all   PHQ-9: Brief Depression Severity Measure Score 0       Health Habits and Functional and Cognitive Screenin/20/2023    10:04 AM   Functional & Cognitive Status   Do you have difficulty preparing food and eating? No   Do you have difficulty bathing yourself, getting dressed or grooming yourself? No   Do you have difficulty using the toilet? No   Do you have difficulty moving around from place to place? No   Do you have trouble with steps or getting out of a bed or a chair? No   Current Diet Unhealthy Diet   Dental Exam Up to date   Eye Exam Up to date   Exercise (times per week) 1 times per week   Current Exercises Include Walking;Other        Exercise Comment golf   Do you need help using the phone?  No   Are you deaf or do you have serious difficulty hearing?  Yes   Do you need help to go to places out of walking distance? No   Do you need help shopping? No   Do you need help preparing meals?  No   Do you need help with housework?  No   Do you need help with laundry? No   Do you need help taking your medications? No   Do you need help managing money? No   Do you ever drive or ride in a car without wearing a seat belt? No   Have you felt unusual stress, anger or loneliness in the last month? No   Who do you live with? Spouse   If you need help, do you have trouble finding someone available to you? No   Have you been bothered in the last four weeks by sexual problems? No   Do you have difficulty concentrating, remembering or making decisions? No        Age-appropriate Screening Schedule:  Refer to the list below for future screening recommendations based on patient's age, sex and/or medical conditions. Orders for these recommended tests are listed in the plan section. The patient has been provided with a written plan.    Health Maintenance   Topic Date Due    TDAP/TD VACCINES (1 - Tdap) Never done    ZOSTER VACCINE (1 of 2) Never done    AAA SCREEN (ONE-TIME)  Never done    BMI FOLLOWUP  07/27/2023    COVID-19 Vaccine (4 - 2023-24 season) 09/01/2023    LIPID PANEL  12/07/2024    ANNUAL WELLNESS VISIT  12/20/2024    COLORECTAL CANCER SCREENING  09/10/2030    HEPATITIS C SCREENING  Completed    INFLUENZA VACCINE  Completed    Pneumococcal Vaccine 65+  Completed                  CMS Preventative Services Quick Reference  Risk Factors Identified During Encounter  {Medicare Wellness Risk Factors:99978}  The above risks/problems have been discussed with the patient.  Pertinent information has been shared with the patient in the After Visit Summary.  An After Visit Summary and PPPS were made available to the patient.    Follow Up:   Next Medicare Wellness visit to be scheduled in 1 year.   {Wrapup  Review (Popup)  Advance Care Planning  Labs  CC  Problem List  Visit Diagnosis  Medications  Result Review  Imaging  [facility] Maintenance  Quality  BestPractice  SmartSets  SnapShot  Encounters  Notes  Media  Procedures :23}    Additional E&M Note during same encounter follows:  Patient has multiple medical problems which are significant and separately identifiable that require additional work above and beyond the Medicare Wellness Visit.      Chief Complaint  Medicare Wellness-subsequent ([facility] - labs 12/7/23 - AUGUSTO 0 PHQ 0) and Neck Pain (Cervical fusion in the 1999. Worsening neck pain in last several months, currently in PT as referred by neurosurgeon. Pt was told he needs updated MRI. Swelling above L clavicle. Pt rates pain 7/10)    Subjective   "  {Problem List  Visit Diagnosis   Encounters  Notes  Medications  Labs  Result Review Imaging  Media :23}    HPI  Hoang So is also being seen today for ***    Review of Systems   Musculoskeletal:  Positive for neck pain.       Objective   Vital Signs:  /74   Pulse 84   Temp 98 °F (36.7 °C)   Ht 182.9 cm (72.01\")   Wt 129 kg (284 lb)   SpO2 98%   BMI 38.51 kg/m²     Physical Exam     {The following data was reviewed by (Optional):75900}  {Ambulatory Labs (Optional):20808}  {Data reviewed (Optional):63128:::1}           Assessment and Plan {CC Problem List  Visit Diagnosis   ROS  Review (Popup)  Health Maintenance  Quality  BestPractice  Medications  SmartSets  SnapShot Encounters  Media :23}  There are no diagnoses linked to this encounter.       {Time Spent (Optional):48341}  Follow Up {Instructions Charge Capture  Follow-up Communications :23}  No follow-ups on file.  Patient was given instructions and counseling regarding his condition or for health maintenance advice. Please see specific information pulled into the AVS if appropriate.           "

## 2023-12-22 ENCOUNTER — PRIOR AUTHORIZATION (OUTPATIENT)
Dept: FAMILY MEDICINE CLINIC | Facility: CLINIC | Age: 68
End: 2023-12-22
Payer: MEDICARE

## 2023-12-22 NOTE — TELEPHONE ENCOUNTER
PA sent to plan via [facility] Semaglutide-Weight Management 0.25 MG/0.5ML solution auto-injector

## 2023-12-29 ENCOUNTER — TELEPHONE (OUTPATIENT)
Dept: FAMILY MEDICINE CLINIC | Facility: CLINIC | Age: 68
End: 2023-12-29
Payer: MEDICARE

## 2023-12-29 NOTE — TELEPHONE ENCOUNTER
Caller: Hoang So    Relationship: Self    Best call back number: [phone]     What test/procedure requested:      When is it needed:      Where is the test/procedure going to be performed:      Additional information or concerns:  PATIENT CALLED STATED THE MEDICATION OZEMPIC CALLED INTO [facility] WILL NEED A PRIOR AUTH.  CAN ASAEL TRAORE PLEASE CALL PATIENT INSURANCE FOR PRIOR AUTH AND ONCE RECEIVED SEND TO THE PHARMACY.

## 2024-01-11 ENCOUNTER — TELEPHONE (OUTPATIENT)
Dept: FAMILY MEDICINE CLINIC | Facility: CLINIC | Age: 69
End: 2024-01-11
Payer: MEDICARE

## 2024-01-11 NOTE — TELEPHONE ENCOUNTER
Caller: Hoang So    Relationship: Self    Best call back number: [phone]    What form or medical record are you requesting: COPY OF MRI OF SPINE RESULTS    Who is requesting this form or medical record from you: PATIENT    How would you like to receive the form or medical records (pick-up, mail, fax):     Timeframe paperwork needed: EARLIEST CONVENIENCE     Additional notes: PATIENT IS REQUESTING IF HE CAN GET A COPY OF THE RESULTS FROM THE MRI OF HIS SPINE FROM AND A CALLBACK WHEN IT IS READY FOR PICKUP.

## 2024-02-19 ENCOUNTER — OFFICE VISIT (OUTPATIENT)
Dept: NEUROLOGY | Facility: CLINIC | Age: 69
End: 2024-02-19
Payer: MEDICARE

## 2024-02-19 ENCOUNTER — TELEPHONE (OUTPATIENT)
Dept: NEUROLOGY | Facility: CLINIC | Age: 69
End: 2024-02-19

## 2024-02-19 ENCOUNTER — LAB (OUTPATIENT)
Dept: LAB | Facility: HOSPITAL | Age: 69
End: 2024-02-19
Payer: MEDICARE

## 2024-02-19 VITALS
HEART RATE: 63 BPM | BODY MASS INDEX: 38.47 KG/M2 | HEIGHT: 72 IN | DIASTOLIC BLOOD PRESSURE: 80 MMHG | SYSTOLIC BLOOD PRESSURE: 116 MMHG | WEIGHT: 284 LBS

## 2024-02-19 DIAGNOSIS — R20.2 NUMBNESS AND TINGLING: Primary | ICD-10-CM

## 2024-02-19 DIAGNOSIS — R20.0 NUMBNESS AND TINGLING: ICD-10-CM

## 2024-02-19 DIAGNOSIS — R20.2 NUMBNESS AND TINGLING: ICD-10-CM

## 2024-02-19 DIAGNOSIS — R20.0 NUMBNESS AND TINGLING: Primary | ICD-10-CM

## 2024-02-19 LAB
FOLATE SERPL-MCNC: 12.02 NG/ML (ref 4.78–24.2)
VIT B12 BLD-MCNC: 652 PG/ML (ref 211–946)

## 2024-02-19 PROCEDURE — 3074F SYST BP LT 130 MM HG: CPT | Performed by: PSYCHIATRY & NEUROLOGY

## 2024-02-19 PROCEDURE — 82175 ASSAY OF ARSENIC: CPT | Performed by: PSYCHIATRY & NEUROLOGY

## 2024-02-19 PROCEDURE — 36415 COLL VENOUS BLD VENIPUNCTURE: CPT

## 2024-02-19 PROCEDURE — 99204 OFFICE O/P NEW MOD 45 MIN: CPT | Performed by: PSYCHIATRY & NEUROLOGY

## 2024-02-19 PROCEDURE — 82746 ASSAY OF FOLIC ACID SERUM: CPT

## 2024-02-19 PROCEDURE — 83655 ASSAY OF LEAD: CPT | Performed by: PSYCHIATRY & NEUROLOGY

## 2024-02-19 PROCEDURE — 3079F DIAST BP 80-89 MM HG: CPT | Performed by: PSYCHIATRY & NEUROLOGY

## 2024-02-19 PROCEDURE — 1160F RVW MEDS BY RX/DR IN RCRD: CPT | Performed by: PSYCHIATRY & NEUROLOGY

## 2024-02-19 PROCEDURE — 1159F MED LIST DOCD IN RCRD: CPT | Performed by: PSYCHIATRY & NEUROLOGY

## 2024-02-19 PROCEDURE — 83825 ASSAY OF MERCURY: CPT | Performed by: PSYCHIATRY & NEUROLOGY

## 2024-02-19 PROCEDURE — 82607 VITAMIN B-12: CPT

## 2024-02-19 NOTE — ASSESSMENT & PLAN NOTE
"68 year old man with numbness and tingling in his feet and also pain in his left arm.  He reports his symptoms starting possibly over a year ago.  He reports tingling and in the bottoms of his feet he has loss of sensation and feels like he is walking on a \"piece of fat\".  He notices the symptoms mostly in the AM and when he gets up in the middle of the night.  This has not effected his balance and gait.  No falls.  He thinks the symptoms have stayed about the same and have not really gotten much worse.  Of note he has had lab work including CMP, [id number] (5.7% at highest reported), normal TSH and globulin levels most recently.  He is not sure about exposure to heavy metals or toxins, he did work as a  prior to retiring.  He has had a cervical spine MRI scan which did demonstrated degenerative changes and multi level foraminal narrowing severe at certain levels and moderate canal stenosis at C6-7 and mild to moderate at C4-5.  He has had previous fusion surgery.  He will be seeing his NUS later today.  He does report having some aching in his left arm/shoulder and was told that he has lost some strength in that arm as well.  He does have Afib and is on Xarelto currently.  There is family history of similar findings in his brother.  He does drink 6-12 beers per week.  He is taking Vit B12 supplements as his B12 level was on the lower side of normal last checked in 2022.  I will check lab work including Vit B12, folate and heavy metal toxin. I will also order EMG/NCS of bilateral lower extremities and left upper extremity for further evaluation.  Provided patient education information on neuropathy and answered all of their questions with regards to neuropathy and radiculopathy.   "

## 2024-02-19 NOTE — TELEPHONE ENCOUNTER
Tried calling pt with NORMAL b12 results- started to leave a vm- then it was cut off will try again

## 2024-02-19 NOTE — PROGRESS NOTES
"Chief Complaint  Numbness and tingling    Subjective          Hoang So presents to [facility] NEUROLOGY for   HISTORY OF PRESENT ILLNESS:    Hoang So is a 68 year old man who presents to neurology clinic with his wife, Jessica, for initial evaluation and treatment of numbness and tingling in his feet and also pain in his left arm.  He reports his symptoms starting possibly over a year ago.  He reports tingling and in the bottoms of his feet he has loss of sensation and feels like he is walking on a \"piece of fat\".  He notices the symptoms mostly in the AM and when he gets up in the middle of the night.  This has not effected his balance and gait.  No falls.  He thinks the symptoms have stayed about the same and have not really gotten much worse.  Of note he has had lab work including CMP, [id number] (5.7% at highest reported), normal TSH and globulin levels most recently.  He is not sure about exposure to heavy metals or toxins, he did work as a  prior to retiring.  He has had a cervical spine MRI scan which did demonstrated degenerative changes and multi level foraminal narrowing severe at certain levels and moderate canal stenosis at C6-7 and mild to moderate at C4-5.  He has had previous fusion surgery.  He will be seeing his NUS later today.  He does report having some aching in his left arm/shoulder and was told that he has lost some strength in that arm as well.  He does have Afib and is on Xarelto currently.  There is family history of similar findings in his brother.  He does drink 6-12 beers per week.  He is taking Vit B12 supplements as his B12 level was on the lower side of normal last checked in 2022.      Past Medical History:   Diagnosis Date    Abnormal EKG 04/2018    Atrial fibrillation     Cervical radiculopathy 09/2019    Cervical stenosis of spine     Colon polyps     FOLLOWED BY DR. MIKEY YEH    DDD (degenerative disc disease), cervical     DDD (degenerative disc " disease), lumbar     Dizziness     GODOY (dyspnea on exertion) 2017    Hamstring tendonitis 2016    BILATERAL    Hemorrhoids     Hyperlipidemia     MIXED    Hypersomnia     Hypertension     Infected epidermoid cyst 2017    SEEN AT [facility]    Low back pain     Lumbar radiculopathy 2017    Obesity (BMI 30-39.9)     SULLY on CPAP     CPAP    PAF (paroxysmal atrial fibrillation)     Partial thickness burn of buttock 2018    SEEN AT [facility]    Perianal fistula 2018    Perirectal abscess 2018    SEEN AT [facility]    Snoring     Trigger finger, left ring finger 2019        Family History   Problem Relation Age of Onset    Heart disease Father     Heart attack Father     Diabetes Father     Hypertension Father     No Known Problems Sister     No Known Problems Sister     Diabetes Brother     Hypertension Brother     Heart disease Brother     Diabetes Brother     Heart failure Brother     No Known Problems Daughter     No Known Problems Daughter     No Known Problems Son     Malshelli Hyperthermia Neg Hx         Social History     Socioeconomic History    Marital status:    Tobacco Use    Smoking status: Former     Packs/day: 0.25     Years: 10.00     Additional pack years: 0.00     Total pack years: 2.50     Types: Cigarettes     Quit date: 2008     Years since quittin.1    Smokeless tobacco: Never    Tobacco comments:     daily caffiene   Vaping Use    Vaping Use: Never used   Substance and Sexual Activity    Alcohol use: Yes     Comment: 2-3 times/week     Drug use: No    Sexual activity: Defer        I have reviewed and confirmed the accuracy of the ROS as documented by the MA/LPN/RN Katiana Marcelo MD   Review of Systems   Constitutional:  Negative for activity change and appetite change.   HENT:  Negative for trouble swallowing and voice change.    Eyes:  Negative for blurred vision, double vision and pain.   Musculoskeletal:  Positive for neck pain. Negative for gait problem.  "  Allergic/Immunologic: Negative for environmental allergies and food allergies.   Neurological:  Negative for dizziness, tremors, seizures, syncope, facial asymmetry, speech difficulty, weakness, light-headedness, numbness, headache, memory problem and confusion.   Psychiatric/Behavioral:  Negative for agitation, behavioral problems, decreased concentration, dysphoric mood, hallucinations, self-injury, sleep disturbance, suicidal ideas, negative for hyperactivity, depressed mood and stress. The patient is not nervous/anxious.         Objective   Vital Signs:   /80   Pulse 63   Ht 182.9 cm (72.01\")   Wt 129 kg (284 lb)   BMI 38.51 kg/m²       PHYSICAL EXAM:    General   Mental Status - Alert. General Appearance - Well developed, Well groomed, Oriented and Cooperative. Orientation - Oriented X3.       Head and Neck  Head - normocephalic, atraumatic with no lesions or palpable masses.  Neck    Global Assessment - supple.       Eye   Sclera/Conjunctiva - Bilateral - Normal.    ENMT  Mouth and Throat   Oral Cavity/Oropharynx: Oropharynx - the soft palate,uvula and tongue are normal in appearance.    Chest and Lung Exam   Chest - lung clear to auscultation bilaterally.    Cardiovascular   Cardiovascular examination reveals  - normal heart sounds, regular rate and rhythm.    Neurologic   Mental Status: Speech - Normal. Cognitive function - appropriate fund of knowledge. No impairment of attention, Impairment of concentration, impairment of long term memory or impairment of short term memory.  Cranial Nerves:   II Optic: Visual acuity - Left - Normal. Right - Normal. Visual fields - Normal (to confrontation).  III Oculomotor: Pupillary constriction - Left - Normal. Right - Normal.  VII Facial: - Normal Bilaterally.   IX Glossopharyngeal / X Vagus - Normal.  XI Accessory: Trapezius - Bilateral - Normal. Sternocleidomastoid - Bilateral - Normal.  XII Hypoglossal - Bilateral - Normal.  Eye Movements: - Normal " "Bilaterally.  Sensory:   Light Touch: Intact - Globally.  Vibration: Reduced in bilateral lower extremities distally > proximally  Temperature: Reduced in bilateral lower extremities distally > proximally  Motor:   Bulk and Contour: - Normal.  Tone: - Normal.  Tremor: Not present.  Strength: 5/5 normal muscle strength - All Muscles.   General Assessment of Reflexes: - deep tendon reflexes are normal. Coordination - No Impairment of finger-to-nose or Impairment of rapid alternating movements. Gait - Normal.       Result Review :                 Assessment and Plan    Problem List Items Addressed This Visit          Symptoms and Signs    Numbness and tingling - Primary    Current Assessment & Plan     68 year old man with numbness and tingling in his feet and also pain in his left arm.  He reports his symptoms starting possibly over a year ago.  He reports tingling and in the bottoms of his feet he has loss of sensation and feels like he is walking on a \"piece of fat\".  He notices the symptoms mostly in the AM and when he gets up in the middle of the night.  This has not effected his balance and gait.  No falls.  He thinks the symptoms have stayed about the same and have not really gotten much worse.  Of note he has had lab work including CMP, [id number] (5.7% at highest reported), normal TSH and globulin levels most recently.  He is not sure about exposure to heavy metals or toxins, he did work as a  prior to retiring.  He has had a cervical spine MRI scan which did demonstrated degenerative changes and multi level foraminal narrowing severe at certain levels and moderate canal stenosis at C6-7 and mild to moderate at C4-5.  He has had previous fusion surgery.  He will be seeing his NUS later today.  He does report having some aching in his left arm/shoulder and was told that he has lost some strength in that arm as well.  He does have Afib and is on Xarelto currently.  There is family history of similar findings " in his brother.  He does drink 6-12 beers per week.  He is taking Vit B12 supplements as his B12 level was on the lower side of normal last checked in 2022.  I will check lab work including Vit B12, folate and heavy metal toxin. I will also order EMG/NCS of bilateral lower extremities and left upper extremity for further evaluation.  Provided patient education information on neuropathy and answered all of their questions with regards to neuropathy and radiculopathy.          Relevant Orders    Vitamin B12    Folate    Heavy Metals, Blood    EMG & Nerve Conduction Test       I spent 50 minutes caring for Hoang on this date of service. This time includes time spent by me in the following activities:preparing for the visit, reviewing tests, obtaining and/or reviewing a separately obtained history, performing a medically appropriate examination and/or evaluation , counseling and educating the patient/family/caregiver, ordering medications, tests, or procedures, documenting information in the medical record, and care coordination    Follow Up   No follow-ups on file.  Patient was given instructions and counseling regarding his condition or for health maintenance advice. Please see specific information pulled into the AVS if appropriate.

## 2024-02-24 LAB
ARSENIC BLD-MCNC: 2 UG/L (ref 0–9)
LEAD BLDV-MCNC: 1 UG/DL (ref 0–3.4)
MERCURY BLD-MCNC: <1 UG/L (ref 0–14.9)

## 2024-02-29 DIAGNOSIS — E78.2 MIXED HYPERLIPIDEMIA: ICD-10-CM

## 2024-02-29 RX ORDER — PRAVASTATIN SODIUM 20 MG
20 TABLET ORAL DAILY
Qty: 90 TABLET | Refills: 0 | Status: SHIPPED | OUTPATIENT
Start: 2024-02-29

## 2024-03-22 ENCOUNTER — OFFICE VISIT (OUTPATIENT)
Age: 69
End: 2024-03-22
Payer: MEDICARE

## 2024-03-22 VITALS
HEIGHT: 72 IN | BODY MASS INDEX: 39.82 KG/M2 | DIASTOLIC BLOOD PRESSURE: 92 MMHG | SYSTOLIC BLOOD PRESSURE: 134 MMHG | HEART RATE: 62 BPM | WEIGHT: 294 LBS

## 2024-03-22 DIAGNOSIS — I48.0 PAF (PAROXYSMAL ATRIAL FIBRILLATION): Primary | ICD-10-CM

## 2024-03-22 DIAGNOSIS — E78.2 MIXED HYPERLIPIDEMIA: ICD-10-CM

## 2024-03-22 DIAGNOSIS — I10 BENIGN ESSENTIAL HYPERTENSION: ICD-10-CM

## 2024-03-22 DIAGNOSIS — M48.02 CERVICAL STENOSIS OF SPINE: ICD-10-CM

## 2024-03-22 DIAGNOSIS — G47.33 OSA (OBSTRUCTIVE SLEEP APNEA): ICD-10-CM

## 2024-03-22 PROBLEM — G47.10 HYPERSOMNIA: Status: RESOLVED | Noted: 2017-11-14 | Resolved: 2024-03-22

## 2024-03-22 NOTE — PROGRESS NOTES
"      ELECTROPHYSIOLOGY   Date of Office Visit: 2024  Patient Name: Hoang So  : 1955  Encounter Provider: Spike Hines PA-C  Primary Cardiologist: Niki Aguillon MD  Electrophysiologist: Dr. Lee  CHIEF COMPLAINT / REASON FOR OFFICE VISIT     Atrial Fibrillation (1 year follow up)      HISTORY OF PRESENT ILLNESS     This is a 68 y.o. year old male who presents to [facility] CARDIOLOGY for a 1 year follow up of cardiovascular health.     He has a history of paroxysmal atrial fibrillation.  This has been well-managed with propafenone 225 mg every 8 hours.  He also has been taking metoprolol 12.5 mg twice daily.    Today the patient reports overall he has been doing well.    He has not had any palpitations or episodes of dizziness or lightheadedness.  He has been tolerating his medication well.    He has been trying to lose weight and was started on Ozempic for a brief period of time.     He has been compliant with taking his Xarelto 20 mg daily and denies any bleeding complications.    He is having neck pain and is starting Pt next week. He may need to have upcoming surgery with this and is following with the neurosurgery team in the upcoming weeks.    PMHx:  PAF, HTN, HL, obstructive sleep apnea with CPAP use, obesity    PHYSICAL EXAMINATION     Vital Signs:  /92   Pulse 62   Ht 182.9 cm (72\")   Wt 133 kg (294 lb)   BMI 39.87 kg/m²   Estimated body mass index is 39.87 kg/m² as calculated from the following:    Height as of this encounter: 182.9 cm (72\").    Weight as of this encounter: 133 kg (294 lb).               Physical Exam  Constitutional:       Appearance: Normal appearance.   HENT:      Head: Normocephalic and atraumatic.   Cardiovascular:      Rate and Rhythm: Normal rate and regular rhythm.      Pulses: Normal pulses.      Heart sounds: Murmur heard.      Systolic murmur is present with a grade of 2/6.   Pulmonary:      Effort: Pulmonary effort is " normal.      Breath sounds: Normal breath sounds.   Musculoskeletal:      Right lower leg: No edema.      Left lower leg: No edema.   Skin:     General: Skin is warm and dry.   Neurological:      General: No focal deficit present.      Mental Status: He is alert and oriented to person, place, and time.          Cardiac Testing/Results     Cardiac Testing:   - Echo 10/13/2017: EF 62%, Mild MR. Moderate asymmetric hypertrophy.     Result Review :  The following data was reviewed by: Spike Hines PA-C on 03/22/2024:    Lipid Panel          6/1/2023    09:54 12/7/2023    15:06   Lipid Panel   Total Cholesterol 179  132    Triglycerides 187  139    HDL Cholesterol 33  35    VLDL Cholesterol 33  25    LDL Cholesterol  113  72       Lab Results   Component Value Date     12/07/2023     06/01/2023    K 4.5 12/07/2023    K 4.4 06/01/2023     12/07/2023     06/01/2023    CO2 25.9 12/07/2023    CO2 24.0 06/01/2023    BUN 16 12/07/2023    BUN 15 06/01/2023    CREATININE 1.03 12/07/2023    CREATININE 1.13 06/01/2023    EGFRIFNONA 66 01/06/2022    EGFRIFNONA 62 10/07/2020    EGFRIFAFRI 76 01/06/2022    EGFRIFAFRI >60 04/08/2021    GLUCOSE 86 12/07/2023    GLUCOSE 105 (H) 06/01/2023    CALCIUM 9.6 12/07/2023    CALCIUM 9.6 06/01/2023    ALBUMIN 4.3 12/07/2023    ALBUMIN 4.1 06/01/2023    AST 19 12/07/2023    AST 17 06/01/2023    ALT 24 12/07/2023    ALT 20 06/01/2023     Lab Results   Component Value Date    WBC 8.40 12/07/2023    WBC 7.7 01/06/2022    HGB 15.4 12/07/2023    HGB 15.7 01/06/2022    HCT 45.4 12/07/2023    HCT 47.8 01/06/2022    MCV 90.1 12/07/2023    MCV 90 01/06/2022     12/07/2023     01/06/2022     Lab Results   Component Value Date    PROBNP 128.3 04/18/2018    PROBNP 1,281.0 (H) 10/13/2017     Lab Results   Component Value Date    CKTOTAL 68 02/09/2023    TROPONINT <0.010 04/18/2018     Lab Results   Component Value Date    TSH 1.080 12/07/2023    TSH 1.730  12/08/2022                 ECG 12 Lead    Date/Time: 3/22/2024 11:52 AM  Performed by: Spike Edwards PA-C    Authorized by: Spike Edwards PA-C  Comparison: compared with previous ECG from 12/7/2023  Rhythm: sinus rhythm  Rate: normal  BPM: 62  QRS axis: normal    Clinical impression: normal ECG  Comments: Qtc normal at 432                ASSESSMENT & PLAN       Diagnoses and all orders for this visit:    1. PAF (paroxysmal atrial fibrillation) (Primary)  -     ECG 12 Lead  No reoccurrence of AF, he has been on propafenone 225 mg three times daily since 2018  Maintaining sinus rhythm with metoprolol 12.5 mg twice daily  Prior symptoms were fatigue.   CHADS2-VASc score 3, continue Xarelto 20 mg daily, denies bleeding complications  2. Benign essential hypertension  Blood pressure with mild elevation of diastolic reading today, home BP has been well controlled. Continue current medications  3. Mixed hyperlipidemia  Prior intolerance to atorvastatin from myalgias, resolved on zetia and pravastatin  4. SULLY (obstructive sleep apnea)  Well controlled, reports compliance       Follow Up:  Return in about 1 year (around 3/22/2025) for Dr. Lee- Routine.  Patient was given instructions and counseling regarding his condition or for health maintenance advice. Please contact office if worsening symptoms or proceed to ER when appropriate.      Spike Edwards PA-C  03/22/24  13:04 EDT    MEDICATIONS         Discharge Medications            Accurate as of March 22, 2024  1:04 PM. If you have any questions, ask your nurse or doctor.                Continue These Medications        Instructions Start Date   acetaminophen 500 MG tablet  Commonly known as: TYLENOL   1,000 mg, Oral, Every 6 Hours PRN, Prn      ezetimibe 10 MG tablet  Commonly known as: ZETIA   10 mg, Oral, Daily      metoprolol tartrate 25 MG tablet  Commonly known as: LOPRESSOR   12.5 mg, Oral, Every 12 Hours      polyethylene glycol 17 g  packet  Commonly known as: MIRALAX   17 g, Oral, Daily, prn      pravastatin 20 MG tablet  Commonly known as: PRAVACHOL   20 mg, Oral, Daily      propafenone 225 MG tablet  Commonly known as: RYTHMOL   225 mg, Oral, Every 8 Hours      rivaroxaban 20 MG tablet  Commonly known as: Xarelto   20 mg, Oral, Daily      vitamin B-12 100 MCG tablet  Commonly known as: CYANOCOBALAMIN   50 mcg, Oral, Daily                   **Dragon Disclaimer: This note was dictated using an electronic transcription. The electronic translation of spoken language may permit erroneous, or at times, nonsensical words or phrases to be inadvertently transcribed. Although I have reviewed the note for such errors, some may still exist.

## 2024-04-02 DIAGNOSIS — E78.5 HYPERLIPIDEMIA, UNSPECIFIED HYPERLIPIDEMIA TYPE: ICD-10-CM

## 2024-04-02 RX ORDER — EZETIMIBE 10 MG/1
10 TABLET ORAL DAILY
Qty: 90 TABLET | Refills: 0 | Status: SHIPPED | OUTPATIENT
Start: 2024-04-02

## 2024-05-10 ENCOUNTER — HOSPITAL ENCOUNTER (OUTPATIENT)
Dept: INFUSION THERAPY | Facility: HOSPITAL | Age: 69
Discharge: HOME | End: 2024-05-10
Payer: MEDICARE

## 2024-05-10 DIAGNOSIS — R20.2 NUMBNESS AND TINGLING: ICD-10-CM

## 2024-05-10 DIAGNOSIS — R20.0 NUMBNESS AND TINGLING: ICD-10-CM

## 2024-05-10 PROCEDURE — 95886 MUSC TEST DONE W/N TEST COMP: CPT

## 2024-05-10 PROCEDURE — 95886 MUSC TEST DONE W/N TEST COMP: CPT | Performed by: PSYCHIATRY & NEUROLOGY

## 2024-05-10 PROCEDURE — 95913 NRV CNDJ TEST 13/> STUDIES: CPT | Performed by: PSYCHIATRY & NEUROLOGY

## 2024-05-10 PROCEDURE — 95911 NRV CNDJ TEST 9-10 STUDIES: CPT

## 2024-05-10 NOTE — PROCEDURES
EMG and Nerve Conduction Studies    I.      Instrument used: Neuromax 1002  II.     Please see data sheets for tabular summary of NCS and details on methods, temperatures and lab standards.   III.    EMG muscles tested for upper extremity studies include the deltoid, biceps, triceps, pronator teres, extensor digitorum communis, first dorsal interosseous and abductor pollicis brevis.    IV.   EMG muscles tested for lower extremity studies include the vastus lateralis, tibialis anterior, peroneus longus, medial gastrocnemius and extensor digitorum brevis.    V.    Additional muscles tested as needed.  Paraspinal muscles tested as needed.   VI.   Please see data sheets for tabular summary of EMG findings.   VII. The complete report includes the data sheets.      Indication: Tingling in the feet and aching in the left arm.  Previous anterior cervical discectomy  History: 68-year-old male with tingling in the feet and aching in the left arm.  He had a previous anterior cervical discectomy 20 years ago by Dr. Tovar      Ht: 182.9 cm  Wt: 133 kg  HbA1C:   Lab Results   Component Value Date    HGBA1C 5.50 12/07/2023     TSH:   Lab Results   Component Value Date    TSH 1.080 12/07/2023       Technical summary:  Nerve conduction studies were obtained in the left arm and leg with 1 comparison on the right leg.  Skin temperatures were at least 32 °C measured on the left palm.  The feet were very cold initially only about 27 °C.  Efforts to warm them was limited.  Temperature correction was used as indicated.  Needle examination was obtained on selected muscles in the left arm and both legs.    Results:  1.  Prolonged left median antidromic sensory distal latency at 4.2 ms with low amplitude of 10.3 µV.  2.  Normal left ulnar antidromic sensory distal latency with low amplitude of 10.3 µV.  3.  Normal left radial sensory distal latency with low amplitude of 6.3 µV.  4.  Minimally slow left median motor conduction velocity at  48.6 m/s with a normal distal latency and amplitudes.  5.  Normal left ulnar motor conduction velocities with a normal distal latency and amplitudes.  6.  Prolonged left sural sensory distal latency with temperature correction at 4.4 ms with a normal amplitude.  7.  Questionable prolonged left superficial peroneal distal latency with temperature correction at 5.2 ms with a normal amplitude.  Prolonged right superficial peroneal sensory distal latency with temperature correction at 3.9 ms with low amplitude of 3 µV.  8.  Slow left peroneal motor conduction velocity below the knee at 26.9 m/s with a normal velocity in the short segment across the fibular head.  Normal distal latency with very low amplitude of 0.667 mV from ankle stimulation.  9.  Mildly slow left tibial motor velocity at 38.2 m/s with a normal distal latency and amplitudes.  10.  Needle examination of selected muscles in the left arm and left leg showed normal insertional activities throughout.  There was a mild increased number of large motor units in almost every muscle tested in the arm with some increase in firing rate and reduced interference pattern.  The extensor digitorum appeared normal.    In the legs there was an increased number of large motor units in the extensor digitorum brevis muscles bilaterally with increased firing rate and reduced interference pattern.  The remaining muscles tested in the legs showed normal motor units and recruitment or reduced recruitment with complaints of pain    Impression:  Abnormal study consistent with sensorimotor peripheral neuropathy.  In addition there is an old or chronic left C6 radiculopathy although I cannot exclude C5 or C7 involvement.  Clinical correlation is suggested.  No further evidence of a lumbosacral radiculopathy was seen.  The needle exam changes were consistent with the nerve conduction findings.  Study results were discussed with the patient.    Jesse Deng M.D.              Dictated  utilizing Dragon dictation.

## 2024-05-25 DIAGNOSIS — E78.2 MIXED HYPERLIPIDEMIA: ICD-10-CM

## 2024-05-28 RX ORDER — PRAVASTATIN SODIUM 20 MG
20 TABLET ORAL DAILY
Qty: 90 TABLET | Refills: 0 | Status: SHIPPED | OUTPATIENT
Start: 2024-05-28

## 2024-06-27 ENCOUNTER — OFFICE VISIT (OUTPATIENT)
Dept: FAMILY MEDICINE CLINIC | Facility: CLINIC | Age: 69
End: 2024-06-27
Payer: MEDICARE

## 2024-06-27 VITALS
TEMPERATURE: 98 F | DIASTOLIC BLOOD PRESSURE: 78 MMHG | WEIGHT: 296 LBS | SYSTOLIC BLOOD PRESSURE: 118 MMHG | HEIGHT: 72 IN | OXYGEN SATURATION: 98 % | BODY MASS INDEX: 40.09 KG/M2 | HEART RATE: 67 BPM

## 2024-06-27 DIAGNOSIS — Z79.899 HIGH RISK MEDICATION USE: ICD-10-CM

## 2024-06-27 DIAGNOSIS — G62.9 NEUROPATHY: Primary | ICD-10-CM

## 2024-06-27 DIAGNOSIS — E66.01 CLASS 3 SEVERE OBESITY WITH SERIOUS COMORBIDITY AND BODY MASS INDEX (BMI) OF 40.0 TO 44.9 IN ADULT, UNSPECIFIED OBESITY TYPE: ICD-10-CM

## 2024-06-27 DIAGNOSIS — G47.33 OSA (OBSTRUCTIVE SLEEP APNEA): ICD-10-CM

## 2024-06-27 PROCEDURE — 3078F DIAST BP <80 MM HG: CPT | Performed by: NURSE PRACTITIONER

## 2024-06-27 PROCEDURE — 1160F RVW MEDS BY RX/DR IN RCRD: CPT | Performed by: NURSE PRACTITIONER

## 2024-06-27 PROCEDURE — 99214 OFFICE O/P EST MOD 30 MIN: CPT | Performed by: NURSE PRACTITIONER

## 2024-06-27 PROCEDURE — 1125F AMNT PAIN NOTED PAIN PRSNT: CPT | Performed by: NURSE PRACTITIONER

## 2024-06-27 PROCEDURE — 3074F SYST BP LT 130 MM HG: CPT | Performed by: NURSE PRACTITIONER

## 2024-06-27 PROCEDURE — 1159F MED LIST DOCD IN RCRD: CPT | Performed by: NURSE PRACTITIONER

## 2024-06-27 RX ORDER — GABAPENTIN 300 MG/1
300 CAPSULE ORAL NIGHTLY
Qty: 30 CAPSULE | Refills: 0 | Status: SHIPPED | OUTPATIENT
Start: 2024-06-27

## 2024-06-27 NOTE — PROGRESS NOTES
"Chief Complaint  Numbness (Continued numbness and tingling in bilateral feet - discuss recent EMG results ) and Sleep Apnea (Discuss new CPAP machine, plans to order from [facility]. Last sleep study 4/12/16)    Subjective        Hoang So presents to [facility]  History of Present Illness  Hoang So is a 68 y.o. male who presents to the clinic today to follow-up on numbness to bilateral feet and upper arms. He underwent an EMG with neurology.  He has been dealing with upper arm numbness and numbness to the bottom of his feet for some time.  He has seen neurology as well as neurosurgery.  He had a C5/6 ACDF with Dr. Tovar in 2000.  He also had L2/3 laminectomy and synovial cyst resection and 4/23/2021.  Patient saw neurosurgery in February 2024 for symptoms.  It was felt his neuropathic upper extremity symptoms were related to fusion at C5-6 with some foraminal stenosis.  Physical therapy was ordered, but did not improve his symptoms.  Potential surgery was discussed.  His foot symptoms feel like a ball underneath his feet.  It is worse with getting up in the evening and walking around.    He has a history of sleep apnea for over 10 years.  He has required the use of a sleep apnea mask, which has greatly improved his symptoms.  He had a sleep study in April 2016. He was diagnosed with severe obstructive sleep apnea with frequent hypoapneas and apneas. He is in need of a new sleep apnea mask.    Patient was also previously on Ozempic for weight loss and tolerated well.  He lost 20 pounds, however, insurance denied Ozempic.  He is requesting Wegovy prescription.    Objective   Vital Signs:  /78   Pulse 67   Temp 98 °F (36.7 °C)   Ht 182.9 cm (72.01\")   Wt 134 kg (296 lb)   SpO2 98%   BMI 40.14 kg/m²   Estimated body mass index is 40.14 kg/m² as calculated from the following:    Height as of this encounter: 182.9 cm (72.01\").    Weight as of this encounter: 134 kg (296 " lb).               Physical Exam  Vitals reviewed.   Constitutional:       General: He is not in acute distress.     Appearance: Normal appearance. He is obese. He is not ill-appearing, toxic-appearing or diaphoretic.   HENT:      Head: Normocephalic and atraumatic.   Pulmonary:      Effort: Pulmonary effort is normal. No respiratory distress.   Neurological:      General: No focal deficit present.      Mental Status: He is alert and oriented to person, place, and time.      Motor: No weakness.      Gait: Gait normal.   Psychiatric:         Mood and Affect: Mood normal.         Behavior: Behavior normal.        Result Review :        Data reviewed : Consultant notes Neurology and neurosurgery  Sleep apnea test 4/12/2016.  Procedures by Jesse Deng MD (05/10/2024 13:00)   SCANNED - PROCEDURE (04/12/2016)          Assessment and Plan     Diagnoses and all orders for this visit:    1. Class 3 severe obesity with serious comorbidity and body mass index (BMI) of 40.0 to 44.9 in adult, unspecified obesity type (Primary)  -     Semaglutide-Weight Management 0.25 MG/0.5ML solution auto-injector; Inject 0.5 mL under the skin into the appropriate area as directed 1 (One) Time Per Week.  Dispense: 2 mL; Refill: 0    2. High risk medication use  -     ToxASSURE Select 13 Discrete - Urine, Clean Catch  -     gabapentin (NEURONTIN) 300 MG capsule; Take 1 capsule by mouth Every Night.  Dispense: 30 capsule; Refill: 0    3. Neuropathy  -     gabapentin (NEURONTIN) 300 MG capsule; Take 1 capsule by mouth Every Night.  Dispense: 30 capsule; Refill: 0      Peripheral neuropathy: Patient EMG study consistent with sensorimotor peripheral neuropathy.  Patient plans to follow-up with neurosurgery to discuss possible surgical intervention of cervical stenosis.  In the meantime, recommended trying gabapentin for symptoms.  We discussed potential adverse side effects.  Urine drug screen ordered.  Arnulfo reviewed.  Controlled substance  contract signed.  Patient will call if dose adjustment is needed.  He will follow-up with podiatrist for foot pain as pain to bottom of the feet may not be neuropathic in nature.  Hemoglobin A1c normal when last evaluated.  Obesity: Patient tolerated Ozempic well in the past.  Will see if Wegovy prescribed.  Can increase dose after 4 weeks if tolerated well.  Obstructive sleep apnea: Reviewed sleep study from April 2016.  Sleep apnea mask is medically necessary and patient is in need of new sleep apnea mask as it has been greater than 5 years since this has been replaced.         Follow Up     Return in about 3 months (around 9/27/2024) for Recheck.  Patient was given instructions and counseling regarding his condition or for health maintenance advice. Please see specific information pulled into the AVS if appropriate.       Electronically signed by ASAEL Hilario, 06/27/24, 3:28 PM EDT.

## 2024-06-28 ENCOUNTER — PRIOR AUTHORIZATION (OUTPATIENT)
Dept: FAMILY MEDICINE CLINIC | Facility: CLINIC | Age: 69
End: 2024-06-28
Payer: MEDICARE

## 2024-06-28 NOTE — TELEPHONE ENCOUNTER
PA sent to plan via [facility]  Semaglutide-Weight Management 0.25 MG/0.5ML solution auto-injector

## 2024-06-28 NOTE — TELEPHONE ENCOUNTER
PA DENIED  Message from plan: We cover this drug when our criteria are met. The unmet criteria are: has existing cardiovascular disease (like previous heart attack, previous stroke, or symptomatic peripheral artery disease). This decision was from [facility]'s Wegovy (semaglutide) Pharmacy Coverage Policy.

## 2024-06-29 DIAGNOSIS — E78.5 HYPERLIPIDEMIA, UNSPECIFIED HYPERLIPIDEMIA TYPE: ICD-10-CM

## 2024-07-01 RX ORDER — EZETIMIBE 10 MG/1
10 TABLET ORAL DAILY
Qty: 90 TABLET | Refills: 0 | Status: SHIPPED | OUTPATIENT
Start: 2024-07-01

## 2024-07-05 ENCOUNTER — TELEPHONE (OUTPATIENT)
Dept: FAMILY MEDICINE CLINIC | Facility: CLINIC | Age: 69
End: 2024-07-05
Payer: MEDICARE

## 2024-07-08 LAB
6MAM UR QL CFM: NEGATIVE
6MAM/CREAT UR: NOT DETECTED NG/MG CREAT
7AMINOCLONAZEPAM/CREAT UR: NOT DETECTED NG/MG CREAT
A-OH ALPRAZ/CREAT UR: NOT DETECTED NG/MG CREAT
A-OH-TRIAZOLAM/CREAT UR CFM: NOT DETECTED NG/MG CREAT
ALFENTANIL/CREAT UR CFM: NOT DETECTED NG/MG CREAT
ALPHA-HYDROXYMIDAZOLAM, URINE: NOT DETECTED NG/MG CREAT
ALPRAZ/CREAT UR CFM: NOT DETECTED NG/MG CREAT
AMOBARBITAL UR QL CFM: NOT DETECTED
AMPHET/CREAT UR: NOT DETECTED NG/MG CREAT
AMPHETAMINES UR QL CFM: NEGATIVE
BARBITAL UR QL CFM: NOT DETECTED
BARBITURATES UR QL CFM: NEGATIVE
BENZODIAZ UR QL CFM: NEGATIVE
BUPRENORPHINE UR QL CFM: NEGATIVE
BUPRENORPHINE/CREAT UR: NOT DETECTED NG/MG CREAT
BUTABARBITAL UR QL CFM: NOT DETECTED
BUTALBITAL UR QL CFM: NOT DETECTED
BZE/CREAT UR: NOT DETECTED NG/MG CREAT
CANNABINOIDS UR QL CFM: NEGATIVE
CARBOXYTHC/CREAT UR: NOT DETECTED NG/MG CREAT
CLONAZEPAM/CREAT UR CFM: NOT DETECTED NG/MG CREAT
COCAETHYLENE/CREAT UR CFM: NOT DETECTED NG/MG CREAT
COCAINE UR QL CFM: NEGATIVE
COCAINE/CREAT UR CFM: NOT DETECTED NG/MG CREAT
CODEINE/CREAT UR: NOT DETECTED NG/MG CREAT
CREAT UR-MCNC: 56 MG/DL
DESALKYLFLURAZ/CREAT UR: NOT DETECTED NG/MG CREAT
DESMETHYLFLUNITRAZEPAM: NOT DETECTED NG/MG CREAT
DHC/CREAT UR: NOT DETECTED NG/MG CREAT
DIAZEPAM/CREAT UR: NOT DETECTED NG/MG CREAT
DRUGS UR: NORMAL
EDDP/CREAT UR: NOT DETECTED NG/MG CREAT
ETHANOL UR CFM-MCNC: NOT DETECTED G/DL
ETHANOL UR QL CFM: NEGATIVE
FENTANYL UR QL CFM: NEGATIVE
FENTANYL/CREAT UR: NOT DETECTED NG/MG CREAT
FLUNITRAZEPAM UR QL CFM: NOT DETECTED NG/MG CREAT
HYDROCODONE/CREAT UR: NOT DETECTED NG/MG CREAT
HYDROMORPHONE/CREAT UR: NOT DETECTED NG/MG CREAT
LORAZEPAM/CREAT UR: NOT DETECTED NG/MG CREAT
MDA/CREAT UR: NOT DETECTED NG/MG CREAT
MDMA/CREAT UR: NOT DETECTED NG/MG CREAT
MEPHOBARBITAL UR QL CFM: NOT DETECTED
METHADONE UR QL CFM: NEGATIVE
METHADONE/CREAT UR: NOT DETECTED NG/MG CREAT
METHAMPHET/CREAT UR: NOT DETECTED NG/MG CREAT
MIDAZOLAM/CREAT UR CFM: NOT DETECTED NG/MG CREAT
MORPHINE/CREAT UR: NOT DETECTED NG/MG CREAT
N-NORTRAMADOL/CREAT UR CFM: NOT DETECTED NG/MG CREAT
NARCOTICS UR: NEGATIVE
NORBUPRENORPHINE/CREAT UR: NOT DETECTED NG/MG CREAT
NORCODEINE/CREAT UR CFM: NOT DETECTED NG/MG CREAT
NORDIAZEPAM/CREAT UR: NOT DETECTED NG/MG CREAT
NORFENTANYL/CREAT UR: NOT DETECTED NG/MG CREAT
NORHYDROCODONE/CREAT UR: NOT DETECTED NG/MG CREAT
NORMORPHINE UR-MCNC: NOT DETECTED NG/MG CREAT
NOROXYCODONE/CREAT UR: NOT DETECTED NG/MG CREAT
NOROXYMORPHONE/CREAT UR CFM: NOT DETECTED NG/MG CREAT
O-NORTRAMADOL UR CFM-MCNC: NOT DETECTED NG/MG CREAT
OPIATES UR QL CFM: NEGATIVE
OXAZEPAM/CREAT UR: NOT DETECTED NG/MG CREAT
OXYCODONE UR QL CFM: NEGATIVE
OXYCODONE/CREAT UR: NOT DETECTED NG/MG CREAT
OXYMORPHONE/CREAT UR: NOT DETECTED NG/MG CREAT
PENTOBARB UR QL CFM: NOT DETECTED
PHENOBARB UR QL CFM: NOT DETECTED
SECOBARBITAL UR QL CFM: NOT DETECTED
SERVICE CMNT 02-IMP: NORMAL
SUFENTANIL/CREAT UR CFM: NOT DETECTED NG/MG CREAT
TAPENTADOL UR QL CFM: NEGATIVE
TAPENTADOL/CREAT UR: NOT DETECTED NG/MG CREAT
TEMAZEPAM/CREAT UR: NOT DETECTED NG/MG CREAT
THIOPENTAL UR QL CFM: NOT DETECTED
TRAMADOL UR QL CFM: NOT DETECTED NG/MG CREAT

## 2024-07-08 NOTE — TELEPHONE ENCOUNTER
Discussed w pt.   Pt v/u   PA DENIED  Message from plan: We cover this drug when our criteria are met. The unmet criteria are: has existing cardiovascular disease (like previous heart attack, previous stroke, or symptomatic peripheral artery disease). This decision was from [facility]'s Wegovy (semaglutide) Pharmacy Coverage Policy.

## 2024-07-29 DIAGNOSIS — Z79.899 HIGH RISK MEDICATION USE: ICD-10-CM

## 2024-07-29 DIAGNOSIS — G62.9 NEUROPATHY: ICD-10-CM

## 2024-07-29 RX ORDER — GABAPENTIN 300 MG/1
300 CAPSULE ORAL NIGHTLY
Qty: 30 CAPSULE | Refills: 0 | Status: SHIPPED | OUTPATIENT
Start: 2024-07-29

## 2024-07-29 NOTE — TELEPHONE ENCOUNTER
Caller: Hoang So    Relationship: Self    Best call back number: [phone]     Requested Prescriptions:   Requested Prescriptions     Pending Prescriptions Disp Refills    gabapentin (NEURONTIN) 300 MG capsule 30 capsule 0     Sig: Take 1 capsule by mouth Every Night.        Pharmacy where request should be sent: [facility] [id number] [facility] [address] AT [facility] [phone] [facility] [phone] FX     Last office visit with prescribing clinician: 6/27/2024   Last telemedicine visit with prescribing clinician: Visit date not found   Next office visit with prescribing clinician: 9/26/2024     Additional details provided by patient:     Does the patient have less than a 3 day supply:  [x] Yes  [] No    Would you like a call back once the refill request has been completed: [] Yes [x] No    If the office needs to give you a call back, can they leave a voicemail: [] Yes [x] No    Whitley Patricia Rep   07/29/24 11:29 EDT

## 2024-07-29 NOTE — TELEPHONE ENCOUNTER
Rx Refill Note  Requested Prescriptions     Pending Prescriptions Disp Refills    gabapentin (NEURONTIN) 300 MG capsule 30 capsule 0     Sig: Take 1 capsule by mouth Every Night.      Last office visit with prescribing clinician: 6/27/2024   Last telemedicine visit with prescribing clinician: Visit date not found   Next office visit with prescribing clinician: 9/26/2024                         Would you like a call back once the refill request has been completed: [] Yes [] No    If the office needs to give you a call back, can they leave a voicemail: [] Yes [] No    Román Chance CMA/LMR  07/29/24, 13:01 EDT

## 2024-08-12 ENCOUNTER — TELEPHONE (OUTPATIENT)
Dept: FAMILY MEDICINE CLINIC | Facility: CLINIC | Age: 69
End: 2024-08-12
Payer: MEDICARE

## 2024-08-12 RX ORDER — ERYTHROMYCIN 5 MG/G
OINTMENT OPHTHALMIC NIGHTLY
Qty: 1 G | Refills: 0 | Status: SHIPPED | OUTPATIENT
Start: 2024-08-12 | End: 2024-08-17

## 2024-08-12 NOTE — TELEPHONE ENCOUNTER
Caller: Hoang So    Relationship: Self    Best call back number: [phone]    What medication are you requesting: RECOMMENDED MEDICATION    What are your current symptoms: STYE ON RIGHT EYE IN THE INNER CORNER OF EYE   How long have you been experiencing symptoms: 2-3 DAYS    Have you had these symptoms before:    [x] Yes  [] No    Have you been treated for these symptoms before:   [] Yes  [x] No    If a prescription is needed, what is your preferred pharmacy and phone number: [facility] [phone] - [facility] [address] AT [facility] [phone] [facility] [phone]      Additional notes:PATIENT STATED THAT HE HAS HAD A STYE ON HIS EYE FOR THE LAST 2-3 DAYS. PATIENT STATED THAT HE HAS TRIED HOT COMPRESSES BUT IT HAS NOT SEEMED T HELP TOO MUCH. PATIENT IS WANTING TO KNOW IF THERE IS ANYTHING AASEL DENTON CAN CALL IN OR RECOMMENDS.PLEASE ADVISE.

## 2024-08-12 NOTE — TELEPHONE ENCOUNTER
I can send in antibiotic medication, but I would recommend in person evaluation with, optometrist or ophthalmologist.

## 2024-08-24 DIAGNOSIS — E78.2 MIXED HYPERLIPIDEMIA: ICD-10-CM

## 2024-08-26 RX ORDER — PRAVASTATIN SODIUM 20 MG
20 TABLET ORAL DAILY
Qty: 90 TABLET | Refills: 0 | Status: SHIPPED | OUTPATIENT
Start: 2024-08-26

## 2024-08-29 DIAGNOSIS — Z79.899 HIGH RISK MEDICATION USE: ICD-10-CM

## 2024-08-29 DIAGNOSIS — G62.9 NEUROPATHY: ICD-10-CM

## 2024-08-29 RX ORDER — GABAPENTIN 300 MG/1
300 CAPSULE ORAL NIGHTLY
Qty: 30 CAPSULE | Refills: 0 | Status: SHIPPED | OUTPATIENT
Start: 2024-08-29

## 2024-08-29 NOTE — TELEPHONE ENCOUNTER
Caller: Hoang So    Relationship: Self    Best call back number: [phone]     Requested Prescriptions:   Requested Prescriptions     Pending Prescriptions Disp Refills    gabapentin (NEURONTIN) 300 MG capsule 30 capsule 0     Sig: Take 1 capsule by mouth Every Night.        Pharmacy where request should be sent: [facility] [id number] [facility] [address] AT [facility] [phone] [facility] [phone] FX     Last office visit with prescribing clinician: 6/27/2024   Last telemedicine visit with prescribing clinician: Visit date not found   Next office visit with prescribing clinician: 9/26/2024     Additional details provided by patient:     Does the patient have less than a 3 day supply:  [x] Yes  [] No    Would you like a call back once the refill request has been completed: [x] Yes [] No    If the office needs to give you a call back, can they leave a voicemail: [x] Yes [] No    Whitley Alcocer Rep   08/29/24 12:22 EDT

## 2024-09-26 ENCOUNTER — OFFICE VISIT (OUTPATIENT)
Dept: FAMILY MEDICINE CLINIC | Facility: CLINIC | Age: 69
End: 2024-09-26
Payer: MEDICARE

## 2024-09-26 VITALS
TEMPERATURE: 98 F | DIASTOLIC BLOOD PRESSURE: 72 MMHG | HEIGHT: 72 IN | OXYGEN SATURATION: 98 % | WEIGHT: 297 LBS | BODY MASS INDEX: 40.23 KG/M2 | SYSTOLIC BLOOD PRESSURE: 118 MMHG | HEART RATE: 70 BPM

## 2024-09-26 DIAGNOSIS — M54.17 LUMBOSACRAL RADICULOPATHY: Primary | ICD-10-CM

## 2024-09-26 DIAGNOSIS — Z23 NEED FOR VACCINATION: ICD-10-CM

## 2024-09-26 PROCEDURE — 1160F RVW MEDS BY RX/DR IN RCRD: CPT | Performed by: NURSE PRACTITIONER

## 2024-09-26 PROCEDURE — 99213 OFFICE O/P EST LOW 20 MIN: CPT | Performed by: NURSE PRACTITIONER

## 2024-09-26 PROCEDURE — G0008 ADMIN INFLUENZA VIRUS VAC: HCPCS | Performed by: NURSE PRACTITIONER

## 2024-09-26 PROCEDURE — 90662 IIV NO PRSV INCREASED AG IM: CPT | Performed by: NURSE PRACTITIONER

## 2024-09-26 PROCEDURE — 1125F AMNT PAIN NOTED PAIN PRSNT: CPT | Performed by: NURSE PRACTITIONER

## 2024-09-26 PROCEDURE — 1159F MED LIST DOCD IN RCRD: CPT | Performed by: NURSE PRACTITIONER

## 2024-09-26 PROCEDURE — 3074F SYST BP LT 130 MM HG: CPT | Performed by: NURSE PRACTITIONER

## 2024-09-26 PROCEDURE — 3078F DIAST BP <80 MM HG: CPT | Performed by: NURSE PRACTITIONER

## 2024-09-26 RX ORDER — DIPHENHYDRAMINE HCL 25 MG
25 TABLET ORAL AS NEEDED
COMMUNITY

## 2024-09-29 DIAGNOSIS — E78.5 HYPERLIPIDEMIA, UNSPECIFIED HYPERLIPIDEMIA TYPE: ICD-10-CM

## 2024-09-30 RX ORDER — EZETIMIBE 10 MG/1
10 TABLET ORAL DAILY
Qty: 90 TABLET | Refills: 0 | Status: SHIPPED | OUTPATIENT
Start: 2024-09-30

## 2024-10-07 ENCOUNTER — TELEPHONE (OUTPATIENT)
Dept: FAMILY MEDICINE CLINIC | Facility: CLINIC | Age: 69
End: 2024-10-07
Payer: MEDICARE

## 2024-10-07 NOTE — TELEPHONE ENCOUNTER
Received fax from [facility], Dr.Lindsey Sierra' office requesting pre-op clearance.   LOV 9/26/24    Per Piper -   Xarelto is prescribed by cardiologist, Dr. Aguillon.   I have faxed form to her office at [phone]

## 2024-10-09 ENCOUNTER — TELEPHONE (OUTPATIENT)
Dept: CARDIOLOGY | Facility: CLINIC | Age: 69
End: 2024-10-09

## 2024-10-09 NOTE — TELEPHONE ENCOUNTER
[facility] [place] is requesting a Clearance request for Pt to have  a cervical LE7,C5,C6.C7. medial braren blocks to be done on 10/22/24.      They would  also like for PT to stop Xarelto 3 days prior.      Please fax [phone] Attn:Naida

## 2024-10-09 NOTE — TELEPHONE ENCOUNTER
[facility]    10/9/2024    Patient: Hoang So  YOB: 1955      This patient has been evaluated by my office.     According to current ACC/AHA perioperative risk management guidelines, endoscopies and procedures performed without general anesthesia are considered to be low risk procedures that do not require further cardiovascular risk stratification.    He may hold anticoagulation 3 days prior to the procedure.    If there are any problems during the procedure, please do not hesitate to contact my office.      Sincerely,    Electronically signed by Niki Aguillon MD, 10/09/24, 11:15 AM EDT.  [facility]

## 2024-11-04 ENCOUNTER — OFFICE VISIT (OUTPATIENT)
Dept: NEUROLOGY | Facility: CLINIC | Age: 69
End: 2024-11-04
Payer: MEDICARE

## 2024-11-04 VITALS
DIASTOLIC BLOOD PRESSURE: 80 MMHG | OXYGEN SATURATION: 98 % | HEART RATE: 84 BPM | SYSTOLIC BLOOD PRESSURE: 144 MMHG | WEIGHT: 294 LBS | HEIGHT: 72 IN | BODY MASS INDEX: 39.82 KG/M2

## 2024-11-04 DIAGNOSIS — G62.9 PERIPHERAL POLYNEUROPATHY: Primary | ICD-10-CM

## 2024-11-04 PROCEDURE — 99213 OFFICE O/P EST LOW 20 MIN: CPT | Performed by: PSYCHIATRY & NEUROLOGY

## 2024-11-04 PROCEDURE — 3077F SYST BP >= 140 MM HG: CPT | Performed by: PSYCHIATRY & NEUROLOGY

## 2024-11-04 PROCEDURE — 3079F DIAST BP 80-89 MM HG: CPT | Performed by: PSYCHIATRY & NEUROLOGY

## 2024-11-04 PROCEDURE — 1160F RVW MEDS BY RX/DR IN RCRD: CPT | Performed by: PSYCHIATRY & NEUROLOGY

## 2024-11-04 PROCEDURE — 1159F MED LIST DOCD IN RCRD: CPT | Performed by: PSYCHIATRY & NEUROLOGY
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
## 2024-11-04 NOTE — ASSESSMENT & PLAN NOTE
"69 year old man with peripheral neuropathy and some cervical radiculopathy.  He reports his symptoms starting possibly around 2 years ago.  He reports tingling and in the bottoms of his feet he has loss of sensation and feels like he is walking on a \"piece of fat\".  He notices the symptoms mostly in the AM and when he gets up in the middle of the night.  This has not effected his balance and gait significantly.  No falls.  He thinks the symptoms have stayed about the same and have not really gotten much worse.  Of note he has had lab work including CMP, OsyyK0w (5.7% at highest reported and down to 5.5% most recently), normal TSH and globulin levels most recently.  He had normal heavy metal testing including arsenic, lead and mercury.  He also had normal Vit B12 and folate.  He has had a cervical spine MRI scan which did demonstrated degenerative changes and multi level foraminal narrowing severe at certain levels and moderate canal stenosis at C6-7 and mild to moderate at C4-5.  He has had previous fusion surgery.  He will be seeing his NUS.  He did report having some aching in his left arm/shoulder which he thinks has improved since last visit.  He does have Afib and is on Xarelto currently.  There is family history of similar findings in his brother.  He does drink 6-12 beers per week and tells me he has cut back some.  He is taking Vit B12 supplements.  He had an EMG/NCS since his initial visit which was interpreted as \"abnormal study consistent with sensorimotor peripheral neuropathy. In addition there is an old or chronic left C6 radiculopathy although I cannot exclude C5 or C7 involvement. Clinical correlation is suggested. No further evidence of a lumbosacral radiculopathy was seen.\"  He has had cervical (C5, C6, C7) block injections performed which he tells me has helped with the symptoms involving his left arm.   I spoke with them about the results of his testing today and potential etiologies.  He tried " gabapentin which made him feel bad so he stopped taking this medicine.  Provided patient education information on peripheral neuropathy and advised him to cut back on alcohol intake.  He does not have much pain so will not prescribe any type of pain medication at this time.  Discussed safety issues in neuropathy.  Will follow up as needed.

## 2024-11-19 DIAGNOSIS — E78.2 MIXED HYPERLIPIDEMIA: ICD-10-CM

## 2024-11-19 RX ORDER — PRAVASTATIN SODIUM 20 MG
20 TABLET ORAL DAILY
Qty: 90 TABLET | Refills: 0 | Status: SHIPPED | OUTPATIENT
Start: 2024-11-19

## 2024-12-12 ENCOUNTER — OFFICE VISIT (OUTPATIENT)
Dept: CARDIOLOGY | Facility: CLINIC | Age: 69
End: 2024-12-12
Payer: MEDICARE

## 2024-12-12 VITALS
HEART RATE: 63 BPM | OXYGEN SATURATION: 98 % | DIASTOLIC BLOOD PRESSURE: 84 MMHG | RESPIRATION RATE: 16 BRPM | WEIGHT: 300 LBS | BODY MASS INDEX: 40.63 KG/M2 | SYSTOLIC BLOOD PRESSURE: 134 MMHG | HEIGHT: 72 IN

## 2024-12-12 DIAGNOSIS — I48.0 PAROXYSMAL ATRIAL FIBRILLATION: ICD-10-CM

## 2024-12-12 DIAGNOSIS — E78.2 MIXED HYPERLIPIDEMIA: ICD-10-CM

## 2024-12-12 DIAGNOSIS — I10 HYPERTENSION, UNSPECIFIED TYPE: ICD-10-CM

## 2024-12-12 DIAGNOSIS — I48.19 PERSISTENT ATRIAL FIBRILLATION: ICD-10-CM

## 2024-12-12 DIAGNOSIS — G47.33 OSA (OBSTRUCTIVE SLEEP APNEA): ICD-10-CM

## 2024-12-12 DIAGNOSIS — I10 BENIGN ESSENTIAL HYPERTENSION: Primary | ICD-10-CM

## 2024-12-12 PROCEDURE — 1160F RVW MEDS BY RX/DR IN RCRD: CPT | Performed by: INTERNAL MEDICINE

## 2024-12-12 PROCEDURE — 3075F SYST BP GE 130 - 139MM HG: CPT | Performed by: INTERNAL MEDICINE

## 2024-12-12 PROCEDURE — 3079F DIAST BP 80-89 MM HG: CPT | Performed by: INTERNAL MEDICINE

## 2024-12-12 PROCEDURE — 93000 ELECTROCARDIOGRAM COMPLETE: CPT | Performed by: INTERNAL MEDICINE

## 2024-12-12 PROCEDURE — 99214 OFFICE O/P EST MOD 30 MIN: CPT | Performed by: INTERNAL MEDICINE

## 2024-12-12 PROCEDURE — 1159F MED LIST DOCD IN RCRD: CPT | Performed by: INTERNAL MEDICINE

## 2024-12-12 RX ORDER — METOPROLOL TARTRATE 25 MG/1
12.5 TABLET, FILM COATED ORAL EVERY 12 HOURS
Qty: 90 TABLET | Refills: 3 | Status: SHIPPED | OUTPATIENT
Start: 2024-12-12

## 2024-12-12 RX ORDER — PROPAFENONE HYDROCHLORIDE 225 MG/1
225 TABLET, FILM COATED ORAL EVERY 8 HOURS
Qty: 270 TABLET | Refills: 3 | Status: SHIPPED | OUTPATIENT
Start: 2024-12-12

## 2024-12-12 NOTE — PROGRESS NOTES
"      CARDIOLOGY    Niki Aguillon MD    ENCOUNTER DATE:  12/12/2024    Hoang So / 69 y.o. / male        CHIEF COMPLAINT / REASON FOR OFFICE VISIT     Follow-up (Yearly follow up/Atrial fib/Hypertension/Hyperlipidemia/PAF )      HISTORY OF PRESENT ILLNESS       HPI    Hoang So is a 69 y.o. male     This is a nice gentleman who presented to the cardiac evaluation clinic in 10/2017.  He complained of dizziness and shortness of breath.  He was found to be having paroxysms of atrial fibrillation.  He was given IV Lasix and IV diltiazem as well as IV metoprolol.  He had an echocardiogram which revealed asymmetric left ventricular hypertrophy, normal LV systolic function, and no significant valvular heart disease.  He was discharged on oral diltiazem.  He had recurrent, symptomatic atrial fibrillation and was started on propafenone and Xarelto.  He had a treadmill stress test in December 2017 which did not show any evidence of ischemia.     He is here for routine follow-up.  No palpitations, chest pain or shortness of breath.  He was having success with weight loss on a semaglutide but his insurance will no longer cover it.    VITAL SIGNS     Visit Vitals  /84   Pulse 63   Resp 16   Ht 182.9 cm (72\")   Wt 136 kg (300 lb)   SpO2 98%   BMI 40.69 kg/m²         Wt Readings from Last 3 Encounters:   12/12/24 136 kg (300 lb)   11/04/24 133 kg (294 lb)   09/26/24 135 kg (297 lb)     Body mass index is 40.69 kg/m².      PHYSICAL EXAMINATION     Constitutional:       General: Not in acute distress.  Neck:      Vascular: No carotid bruit or JVD.   Pulmonary:      Effort: Pulmonary effort is normal.      Breath sounds: Normal breath sounds.   Cardiovascular:      Normal rate. Regular rhythm.      Murmurs: There is no murmur.   Psychiatric:         Mood and Affect: Mood and affect normal.           REVIEWED DATA       ECG 12 Lead    Date/Time: 12/12/2024 10:16 AM  Performed by: Niki Aguillon MD    Authorized " by: Niki Aguillon MD  Comparison: compared with previous ECG from 3/22/2024  Similar to previous ECG  Rhythm: sinus rhythm  BPM: 63  Conduction: conduction normal  ST Segments: ST segments normal  T Waves: T waves normal    Clinical impression: normal ECG                Lab Results   Component Value Date    GLUCOSE 86 12/07/2023    BUN 16 12/07/2023    CREATININE 1.03 12/07/2023     12/07/2023    K 4.5 12/07/2023     12/07/2023    CALCIUM 9.6 12/07/2023    PROTEINTOT 8.0 11/07/2018    ALBUMIN 4.3 12/07/2023    ALT 24 12/07/2023    AST 19 12/07/2023    ALKPHOS 90 12/07/2023    BILITOT 0.6 12/07/2023    GLOB 3.9 11/07/2018    AGRATIO 1.1 11/07/2018    BCR 15.5 12/07/2023    ANIONGAP 6 04/08/2021       ASSESSMENT & PLAN      Diagnosis Plan   1. Benign essential hypertension        2. Hypertension, unspecified type  metoprolol tartrate (LOPRESSOR) 25 MG tablet    Adult Transthoracic Echo Complete W/ Cont if Necessary Per Protocol      3. Paroxysmal atrial fibrillation  propafenone (RYTHMOL) 225 MG tablet    rivaroxaban (Xarelto) 20 MG tablet    Adult Transthoracic Echo Complete W/ Cont if Necessary Per Protocol      4. Mixed hyperlipidemia        5. Persistent atrial fibrillation        6. SULLY (obstructive sleep apnea)            1.  Persistent atrial fibrillation.  He is in sinus rhythm today.  He is on propafenone and anticoagulated with rivaroxaban.  I am going to check an echocardiogram in March 2025 when he goes into see Dr. Lee.    2.  Hypertension.  Blood pressure is controlled.  3.  Hyperlipidemia.  His lipids are followed by his primary provider.  His last lipid panel was in December 2023.  I reviewed the numbers.  I recommend continuing with his current medications which include his Zetia and pravastatin.  4.  Obstructive sleep apnea.  He says he is compliant with CPAP.  5.  Obesity.  I encouraged regular exercise.  He was having success on Wegovy and was losing weight but his insurance  stopped paying for it.  6.  Moderate asymmetric left ventricular hypertrophy with normal LV function by echo in October 2017.  I recommend that we repeat his echo.  He is going to have his repeat echo performed in March when he goes to see Dr. Lee.    Follow-up with me in a year.    Orders Placed This Encounter   Procedures    ECG 12 Lead     This order was created via procedure documentation     Order Specific Question:   Release to patient     Answer:   Routine Release [[id number]]    Adult Transthoracic Echo Complete W/ Cont if Necessary Per Protocol     Standing Status:   Future     Standing Expiration Date:   12/12/2025     Order Specific Question:   Reason for exam?     Answer:   Arrhythmia     Order Specific Question:   Release to patient     Answer:   Routine Release [[id number]]           MEDICATIONS         Discharge Medications            Accurate as of December 12, 2024 10:20 AM. If you have any questions, ask your nurse or doctor.                Changes to Medications        Instructions Start Date   diphenhydrAMINE 25 MG tablet  Commonly known as: BENADRYL  What changed: when to take this   25 mg, As Needed             Continue These Medications        Instructions Start Date   acetaminophen 500 MG tablet  Commonly known as: TYLENOL   1,000 mg, Every 6 Hours PRN      ezetimibe 10 MG tablet  Commonly known as: ZETIA   10 mg, Oral, Daily      metoprolol tartrate 25 MG tablet  Commonly known as: LOPRESSOR   12.5 mg, Oral, Every 12 Hours      polyethylene glycol 17 g packet  Commonly known as: MIRALAX   17 g, Daily      pravastatin 20 MG tablet  Commonly known as: PRAVACHOL   20 mg, Oral, Daily      propafenone 225 MG tablet  Commonly known as: RYTHMOL   225 mg, Oral, Every 8 Hours      rivaroxaban 20 MG tablet  Commonly known as: Xarelto   20 mg, Oral, Daily      vitamin B-12 100 MCG tablet  Commonly known as: CYANOCOBALAMIN   50 mcg, Daily                 Niki Aguillon MD  12/12/24  10:20  EST    Part of this note may be an electronic transcription/translation of spoken language to printed text using the Dragon dictation system.

## 2024-12-19 DIAGNOSIS — E78.2 MIXED HYPERLIPIDEMIA: ICD-10-CM

## 2024-12-19 DIAGNOSIS — Z13.29 SCREENING FOR ENDOCRINE, NUTRITIONAL, METABOLIC AND IMMUNITY DISORDER: ICD-10-CM

## 2024-12-19 DIAGNOSIS — Z13.21 SCREENING FOR ENDOCRINE, NUTRITIONAL, METABOLIC AND IMMUNITY DISORDER: ICD-10-CM

## 2024-12-19 DIAGNOSIS — Z13.0 SCREENING FOR IRON DEFICIENCY ANEMIA: ICD-10-CM

## 2024-12-19 DIAGNOSIS — E53.8 VITAMIN B 12 DEFICIENCY: ICD-10-CM

## 2024-12-19 DIAGNOSIS — Z13.0 SCREENING FOR ENDOCRINE, NUTRITIONAL, METABOLIC AND IMMUNITY DISORDER: ICD-10-CM

## 2024-12-19 DIAGNOSIS — Z13.228 SCREENING FOR ENDOCRINE, NUTRITIONAL, METABOLIC AND IMMUNITY DISORDER: ICD-10-CM

## 2024-12-19 DIAGNOSIS — Z13.29 SCREENING FOR THYROID DISORDER: ICD-10-CM

## 2024-12-19 DIAGNOSIS — R73.03 PREDIABETES: Primary | ICD-10-CM

## 2024-12-19 DIAGNOSIS — Z12.5 SCREENING FOR PROSTATE CANCER: ICD-10-CM

## 2024-12-19 DIAGNOSIS — R73.03 PREDIABETES: ICD-10-CM

## 2024-12-26 DIAGNOSIS — E78.5 HYPERLIPIDEMIA, UNSPECIFIED HYPERLIPIDEMIA TYPE: ICD-10-CM

## 2024-12-26 RX ORDER — EZETIMIBE 10 MG/1
10 TABLET ORAL DAILY
Qty: 90 TABLET | Refills: 0 | Status: SHIPPED | OUTPATIENT
Start: 2024-12-26

## 2024-12-26 NOTE — TELEPHONE ENCOUNTER
Rx Refill Note  Requested Prescriptions     Pending Prescriptions Disp Refills    ezetimibe (ZETIA) 10 MG tablet [Pharmacy Med Name: EZETIMIBE 10 MG TABLET] 90 tablet 0     Sig: TAKE 1 TABLET BY MOUTH DAILY      Last office visit with prescribing clinician: 9/26/2024   Last telemedicine visit with prescribing clinician: Visit date not found   Next office visit with prescribing clinician: 1/8/2025                         Would you like a call back once the refill request has been completed: [] Yes [] No    If the office needs to give you a call back, can they leave a voicemail: [] Yes [] No    Román Chance CMA/LMR  12/26/24, 07:44 EST

## 2025-01-15 LAB
ALBUMIN SERPL-MCNC: 4.2 G/DL (ref 3.5–5.2)
ALBUMIN/GLOB SERPL: 1.6 G/DL
ALP SERPL-CCNC: 98 U/L (ref 39–117)
ALT SERPL-CCNC: 31 U/L (ref 1–41)
AST SERPL-CCNC: 21 U/L (ref 1–40)
BASOPHILS # BLD AUTO: 0.16 10*3/MM3 (ref 0–0.2)
BASOPHILS NFR BLD AUTO: 2 % (ref 0–1.5)
BILIRUB SERPL-MCNC: 0.5 MG/DL (ref 0–1.2)
BUN SERPL-MCNC: 18 MG/DL (ref 8–23)
BUN/CREAT SERPL: 15 (ref 7–25)
CALCIUM SERPL-MCNC: 9.8 MG/DL (ref 8.6–10.5)
CHLORIDE SERPL-SCNC: 104 MMOL/L (ref 98–107)
CHOLEST SERPL-MCNC: 136 MG/DL (ref 0–200)
CO2 SERPL-SCNC: 26.6 MMOL/L (ref 22–29)
CREAT SERPL-MCNC: 1.2 MG/DL (ref 0.76–1.27)
EGFRCR SERPLBLD CKD-EPI 2021: 64.7 ML/MIN/1.73
EOSINOPHIL # BLD AUTO: 0.25 10*3/MM3 (ref 0–0.4)
EOSINOPHIL NFR BLD AUTO: 3.1 % (ref 0.3–6.2)
ERYTHROCYTE [DISTWIDTH] IN BLOOD BY AUTOMATED COUNT: 12.8 % (ref 12.3–15.4)
FOLATE SERPL-MCNC: 10.2 NG/ML (ref 4.78–24.2)
GLOBULIN SER CALC-MCNC: 2.7 GM/DL
GLUCOSE SERPL-MCNC: 116 MG/DL (ref 65–99)
HBA1C MFR BLD: 5.8 % (ref 4.8–5.6)
HCT VFR BLD AUTO: 47.6 % (ref 37.5–51)
HDLC SERPL-MCNC: 33 MG/DL (ref 40–60)
HGB BLD-MCNC: 16.3 G/DL (ref 13–17.7)
IMM GRANULOCYTES # BLD AUTO: 0.04 10*3/MM3 (ref 0–0.05)
IMM GRANULOCYTES NFR BLD AUTO: 0.5 % (ref 0–0.5)
LDLC SERPL CALC-MCNC: 76 MG/DL (ref 0–100)
LYMPHOCYTES # BLD AUTO: 2.59 10*3/MM3 (ref 0.7–3.1)
LYMPHOCYTES NFR BLD AUTO: 32.4 % (ref 19.6–45.3)
MCH RBC QN AUTO: 31 PG (ref 26.6–33)
MCHC RBC AUTO-ENTMCNC: 34.2 G/DL (ref 31.5–35.7)
MCV RBC AUTO: 90.7 FL (ref 79–97)
MONOCYTES # BLD AUTO: 0.77 10*3/MM3 (ref 0.1–0.9)
MONOCYTES NFR BLD AUTO: 9.6 % (ref 5–12)
NEUTROPHILS # BLD AUTO: 4.18 10*3/MM3 (ref 1.7–7)
NEUTROPHILS NFR BLD AUTO: 52.4 % (ref 42.7–76)
NRBC BLD AUTO-RTO: 0 /100 WBC (ref 0–0.2)
PLATELET # BLD AUTO: 227 10*3/MM3 (ref 140–450)
POTASSIUM SERPL-SCNC: 4.2 MMOL/L (ref 3.5–5.2)
PROT SERPL-MCNC: 6.9 G/DL (ref 6–8.5)
PSA SERPL-MCNC: 0.89 NG/ML (ref 0–4)
RBC # BLD AUTO: 5.25 10*6/MM3 (ref 4.14–5.8)
SODIUM SERPL-SCNC: 140 MMOL/L (ref 136–145)
TRIGL SERPL-MCNC: 153 MG/DL (ref 0–150)
TSH SERPL DL<=0.005 MIU/L-ACNC: 2.35 UIU/ML (ref 0.27–4.2)
VIT B12 SERPL-MCNC: 721 PG/ML (ref 211–946)
VLDLC SERPL CALC-MCNC: 27 MG/DL (ref 5–40)
WBC # BLD AUTO: 7.99 10*3/MM3 (ref 3.4–10.8)

## 2025-01-17 ENCOUNTER — OFFICE VISIT (OUTPATIENT)
Dept: FAMILY MEDICINE CLINIC | Facility: CLINIC | Age: 70
End: 2025-01-17
Payer: MEDICARE

## 2025-01-17 VITALS
HEART RATE: 68 BPM | SYSTOLIC BLOOD PRESSURE: 132 MMHG | WEIGHT: 304 LBS | DIASTOLIC BLOOD PRESSURE: 84 MMHG | OXYGEN SATURATION: 97 % | HEIGHT: 72 IN | TEMPERATURE: 96.3 F | BODY MASS INDEX: 41.17 KG/M2

## 2025-01-17 DIAGNOSIS — G47.33 OSA (OBSTRUCTIVE SLEEP APNEA): Primary | ICD-10-CM

## 2025-01-17 DIAGNOSIS — E66.9 OBESITY (BMI 30-39.9): ICD-10-CM

## 2025-01-17 DIAGNOSIS — E66.813 CLASS 3 SEVERE OBESITY WITH SERIOUS COMORBIDITY AND BODY MASS INDEX (BMI) OF 40.0 TO 44.9 IN ADULT, UNSPECIFIED OBESITY TYPE: ICD-10-CM

## 2025-01-17 DIAGNOSIS — Z13.6 SCREENING FOR AAA (ABDOMINAL AORTIC ANEURYSM): ICD-10-CM

## 2025-01-17 DIAGNOSIS — E78.5 HYPERLIPIDEMIA, UNSPECIFIED HYPERLIPIDEMIA TYPE: ICD-10-CM

## 2025-01-17 DIAGNOSIS — E78.2 MIXED HYPERLIPIDEMIA: ICD-10-CM

## 2025-01-17 DIAGNOSIS — E66.01 CLASS 3 SEVERE OBESITY WITH SERIOUS COMORBIDITY AND BODY MASS INDEX (BMI) OF 40.0 TO 44.9 IN ADULT, UNSPECIFIED OBESITY TYPE: ICD-10-CM

## 2025-01-17 PROCEDURE — 3075F SYST BP GE 130 - 139MM HG: CPT | Performed by: NURSE PRACTITIONER

## 2025-01-17 PROCEDURE — 1125F AMNT PAIN NOTED PAIN PRSNT: CPT | Performed by: NURSE PRACTITIONER

## 2025-01-17 PROCEDURE — 96160 PT-FOCUSED HLTH RISK ASSMT: CPT | Performed by: NURSE PRACTITIONER

## 2025-01-17 PROCEDURE — 1170F FXNL STATUS ASSESSED: CPT | Performed by: NURSE PRACTITIONER

## 2025-01-17 PROCEDURE — 3079F DIAST BP 80-89 MM HG: CPT | Performed by: NURSE PRACTITIONER

## 2025-01-17 PROCEDURE — G0439 PPPS, SUBSEQ VISIT: HCPCS | Performed by: NURSE PRACTITIONER

## 2025-01-17 PROCEDURE — 1159F MED LIST DOCD IN RCRD: CPT | Performed by: NURSE PRACTITIONER

## 2025-01-17 PROCEDURE — 1160F RVW MEDS BY RX/DR IN RCRD: CPT | Performed by: NURSE PRACTITIONER

## 2025-01-17 NOTE — ASSESSMENT & PLAN NOTE
Patient's (Body mass index is 41.23 kg/m².) indicates that they are  with health conditions that include  . Weight is . BMI  . We discussed .

## 2025-01-17 NOTE — ASSESSMENT & PLAN NOTE
Orders:    Tirzepatide-Weight Management (ZEPBOUND) 2.5 MG/0.5ML solution auto-injector; Inject 0.5 mL under the skin into the appropriate area as directed 1 (One) Time Per Week.

## 2025-01-17 NOTE — PROGRESS NOTES
Subjective   The ABCs of the Annual Wellness Visit  Medicare Wellness Visit      Hoang So is a 69 y.o. patient who presents for a Medicare Wellness Visit.He has a history of atrial fibrillation, hyperlipidemia, hypertension, sleep apnea, and obesity. He is interested in weight loss medication. Previously on Ozempic.     The following portions of the patient's history were reviewed and   updated as appropriate: allergies, current medications, past family history, past medical history, past social history, past surgical history, and problem list.    Compared to one year ago, the patient's physical   health is the same.  Compared to one year ago, the patient's mental   health is the same.    Recent Hospitalizations:  He was not admitted to the hospital during the last year.     Current Medical Providers:  Patient Care Team:  Piper Roman APRN as PCP - General (Nurse Practitioner)  Dima Vallejo MD (Inactive) as Consulting Physician (Gastroenterology)  Andrey Hope MD as Consulting Physician (Neurosurgery)  Katiana Marcelo MD as Consulting Physician (Neurology)  Niki Aguillon MD as Consulting Physician (Cardiology)  Lionel Lee MD as Consulting Physician (Cardiology)    Outpatient Medications Prior to Visit   Medication Sig Dispense Refill    acetaminophen (TYLENOL) 500 MG tablet Take 2 tablets by mouth Every 6 (Six) Hours As Needed for Mild Pain. Prn      diphenhydrAMINE (BENADRYL) 25 MG tablet Take 1 tablet by mouth As Needed for Itching. (Patient taking differently: Take 1 tablet by mouth Every 6 (Six) Hours As Needed for Itching.)      ezetimibe (ZETIA) 10 MG tablet TAKE 1 TABLET BY MOUTH DAILY 90 tablet 0    metoprolol tartrate (LOPRESSOR) 25 MG tablet Take 0.5 tablets by mouth Every 12 (Twelve) Hours. 90 tablet 3    polyethylene glycol (MIRALAX) 17 g packet Take 17 g by mouth Daily. prn      pravastatin (PRAVACHOL) 20 MG tablet TAKE 1 TABLET BY MOUTH DAILY 90 tablet 0     "propafenone (RYTHMOL) 225 MG tablet Take 1 tablet by mouth Every 8 (Eight) Hours. 270 tablet 3    rivaroxaban (Xarelto) 20 MG tablet Take 1 tablet by mouth Daily. 90 tablet 3    vitamin B-12 (CYANOCOBALAMIN) 100 MCG tablet Take 0.5 tablets by mouth Daily.       No facility-administered medications prior to visit.     No opioid medication identified on active medication list. I have reviewed chart for other potential  high risk medication/s and harmful drug interactions in the elderly.      Aspirin is not on active medication list.  Aspirin use is contraindicated for this patient due to: current use of Xarelto.  .    Patient Active Problem List   Diagnosis    Mixed hyperlipidemia    Low back pain    Leg pain, bilateral    Benign essential hypertension    Cervical radiculopathy    Cervical stenosis of spine    Lumbar radiculopathy    SULLY (obstructive sleep apnea)    Persistent atrial fibrillation    Obesity (BMI 30-39.9)    Anal fistula    Screening for iron deficiency anemia    Special screening for malignant neoplasm of prostate    Trigger ring finger of left hand    Morbid obesity    Weight gain    Flu vaccine need    Carpal tunnel syndrome of right wrist    Bilateral carpal tunnel syndrome    Long term (current) use of anticoagulants    Numbness and tingling    Peripheral polyneuropathy     Advance Care Planning Advance Directive is not on file.  ACP discussion was held with the patient during this visit. Patient does not have an advance directive, declines further assistance.            Objective   Vitals:    01/17/25 0819   BP: 132/84   Pulse: 68   Temp: 96.3 °F (35.7 °C)   SpO2: 97%   Weight: (!) 138 kg (304 lb)   Height: 182.9 cm (72\")   PainSc:   2       Estimated body mass index is 41.23 kg/m² as calculated from the following:    Height as of this encounter: 182.9 cm (72\").    Weight as of this encounter: 138 kg (304 lb).    Class 3 Severe Obesity (BMI >=40). Obesity-related health conditions include the " following: obstructive sleep apnea, hypertension, impaired fasting glucose, and osteoarthritis. Obesity is worsening. BMI is is above average; BMI management plan is completed. We discussed portion control, increasing exercise, and pharmacologic options including Zepbound .           Does the patient have evidence of cognitive impairment? No  Lab Results   Component Value Date    CHLPL 136 2025    TRIG 153 (H) 2025    HDL 33 (L) 2025    LDL 76 2025    VLDL 27 2025    HGBA1C 5.80 (H) 2025                                                                                                Health  Risk Assessment    Smoking Status:  Social History     Tobacco Use   Smoking Status Former    Current packs/day: 0.00    Average packs/day: 0.3 packs/day for 10.0 years (2.5 ttl pk-yrs)    Types: Cigarettes    Start date: 1998    Quit date: 2008    Years since quittin.0   Smokeless Tobacco Never   Tobacco Comments    daily caffiene     Alcohol Consumption:  Social History     Substance and Sexual Activity   Alcohol Use Yes    Comment: 2-3 times/week        Fall Risk Screen  LATIA Fall Risk Assessment was completed, and patient is at LOW risk for falls.Assessment completed on:2025    Depression Screening   Little interest or pleasure in doing things? Not at all   Feeling down, depressed, or hopeless? Not at all   PHQ-2 Total Score 0      Health Habits and Functional and Cognitive Screenin/17/2025     8:24 AM   Functional & Cognitive Status   Do you have difficulty preparing food and eating? No   Do you have difficulty bathing yourself, getting dressed or grooming yourself? No   Do you have difficulty using the toilet? No   Do you have difficulty moving around from place to place? No   Do you have trouble with steps or getting out of a bed or a chair? No   Current Diet Frequent Junk Food   Dental Exam Up to date   Eye Exam Up to date   Exercise (times per week) 3 times  per week   Current Exercises Include Walking   Do you need help using the phone?  No   Are you deaf or do you have serious difficulty hearing?  Yes   Do you need help to go to places out of walking distance? No   Do you need help shopping? No   Do you need help preparing meals?  No   Do you need help with housework?  No   Do you need help with laundry? No   Do you need help taking your medications? No   Do you need help managing money? No   Do you ever drive or ride in a car without wearing a seat belt? No   Have you felt unusual stress, anger or loneliness in the last month? No   Who do you live with? Spouse   If you need help, do you have trouble finding someone available to you? No   Have you been bothered in the last four weeks by sexual problems? No   Do you have difficulty concentrating, remembering or making decisions? No           Age-appropriate Screening Schedule:  Refer to the list below for future screening recommendations based on patient's age, sex and/or medical conditions. Orders for these recommended tests are listed in the plan section. The patient has been provided with a written plan.    Health Maintenance List  Health Maintenance   Topic Date Due    TDAP/TD VACCINES (1 - Tdap) Never done    ZOSTER VACCINE (1 of 2) Never done    AAA SCREEN ONCE  Never done    COVID-19 Vaccine (4 - 2024-25 season) 09/01/2024    ANNUAL WELLNESS VISIT  12/20/2024    LIPID PANEL  01/14/2026    BMI FOLLOWUP  01/17/2026    COLORECTAL CANCER SCREENING  09/10/2030    HEPATITIS C SCREENING  Completed    INFLUENZA VACCINE  Completed    Pneumococcal Vaccine 65+  Completed                                                                                                                                                CMS Preventative Services Quick Reference  Risk Factors Identified During Encounter  Immunizations Discussed/Encouraged: Tdap, Shingrix, and COVID19    Health maintenance screenings:   Colon cancer screening:  "Colonoscopy last performed September 2020.  Repeat in 5 years recommended.  Prostate cancer screening: Last performed January 2024.  Immunizations: Due for TDAP, Shingrix, and COVID-19 booster.      The above risks/problems have been discussed with the patient.  Pertinent information has been shared with the patient in the After Visit Summary.  An After Visit Summary and PPPS were made available to the patient.    Follow Up:   Next Medicare Wellness visit to be scheduled in 1 year.         Additional E&M Note during same encounter follows:  Patient has additional, significant, and separately identifiable condition(s)/problem(s) that require work above and beyond the Medicare Wellness Visit     Chief Complaint  Medicare Wellness-subsequent (PT has no concerns.)    Subjective   HPI  Hoang is also being seen today for additional medical problem/s.          Patient concerned about difficulty losing weight.  Has been on Ozempic in the past, previously denied by insurance.  Has a history of obstructive sleep apnea, hyperlipidemia, and increased cardiovascular risk.      Objective   Vital Signs:  /84   Pulse 68   Temp 96.3 °F (35.7 °C)   Ht 182.9 cm (72\")   Wt (!) 138 kg (304 lb)   SpO2 97%   BMI 41.23 kg/m²   Physical Exam  Vitals reviewed.   Constitutional:       General: He is not in acute distress.     Appearance: He is obese. He is not ill-appearing, toxic-appearing or diaphoretic.   Neurological:      Mental Status: He is alert.      Motor: No weakness.      Gait: Gait normal.   Psychiatric:         Mood and Affect: Mood normal.         Behavior: Behavior normal.             Common labs          1/14/2025    08:32   Common Labs   Glucose 116    BUN 18    Creatinine 1.20    Sodium 140    Potassium 4.2    Chloride 104    Calcium 9.8    Total Protein 6.9    Albumin 4.2    Total Bilirubin 0.5    Alkaline Phosphatase 98    AST (SGOT) 21    ALT (SGPT) 31    WBC 7.99    Hemoglobin 16.3    Hematocrit 47.6  "   Platelets 227    Total Cholesterol 136    Triglycerides 153    HDL Cholesterol 33    LDL Cholesterol  76    Hemoglobin A1C 5.80    PSA 0.892              Assessment and Plan            SULLY (obstructive sleep apnea)    Orders:    Tirzepatide-Weight Management (ZEPBOUND) 2.5 MG/0.5ML solution auto-injector; Inject 0.5 mL under the skin into the appropriate area as directed 1 (One) Time Per Week.    Mixed hyperlipidemia       Orders:    Tirzepatide-Weight Management (ZEPBOUND) 2.5 MG/0.5ML solution auto-injector; Inject 0.5 mL under the skin into the appropriate area as directed 1 (One) Time Per Week.    Obesity (BMI 30-39.9)  Patient's (Body mass index is 41.23 kg/m².) indicates that they are  with health conditions that include  . Weight is . BMI  . We discussed .          Screening for AAA (abdominal aortic aneurysm)    Orders:     aaa screen limited; Future    Class 3 severe obesity with serious comorbidity and body mass index (BMI) of 40.0 to 44.9 in adult, unspecified obesity type  Patient's (Body mass index is 41.23 kg/m².) indicates that they are morbidly/severely obese (BMI > 40 or > 35 with obesity - related health condition) with health conditions that include obstructive sleep apnea, hypertension, impaired fasting glucose, dyslipidemias, and osteoarthritis . Weight is worsening. BMI  is above average; BMI management plan is completed. We discussed portion control, increasing exercise, and pharmacologic options including Zepound. Aware of adverse effects. He needs to make diet changes as well. Limit junk food .     Orders:    Tirzepatide-Weight Management (ZEPBOUND) 2.5 MG/0.5ML solution auto-injector; Inject 0.5 mL under the skin into the appropriate area as directed 1 (One) Time Per Week.    Hyperlipidemia, unspecified hyperlipidemia type       Orders:    Tirzepatide-Weight Management (ZEPBOUND) 2.5 MG/0.5ML solution auto-injector; Inject 0.5 mL under the skin into the appropriate area as directed 1  (One) Time Per Week.            Follow Up   Return in about 3 months (around 4/17/2025) for Recheck- weight.  Patient was given instructions and counseling regarding his condition or for health maintenance advice. Please see specific information pulled into the AVS if appropriate.      Electronically signed by ASAEL Hilario, 01/17/25, 8:48 AM EST.

## 2025-01-23 ENCOUNTER — TELEPHONE (OUTPATIENT)
Dept: FAMILY MEDICINE CLINIC | Facility: CLINIC | Age: 70
End: 2025-01-23
Payer: MEDICARE

## 2025-01-23 NOTE — TELEPHONE ENCOUNTER
Caller: RICO    Relationship: Provider    Best call back number: [phone]     Equipment requested: C PAP     Reason for the request: NEEDING ANOTHER PRESCRIPTION     Prescribing Provider: NATASHA DENTON     Additional information or concerns: PROVIDER IS FAXING PAPERWORK REQUESTING A PRESCRIPTION FOR CPAP MACHINE. FAX NUMBER [phone]

## 2025-01-24 DIAGNOSIS — G47.33 OSA (OBSTRUCTIVE SLEEP APNEA): ICD-10-CM

## 2025-01-24 DIAGNOSIS — E66.813 CLASS 3 SEVERE OBESITY WITH SERIOUS COMORBIDITY AND BODY MASS INDEX (BMI) OF 40.0 TO 44.9 IN ADULT, UNSPECIFIED OBESITY TYPE: ICD-10-CM

## 2025-01-24 DIAGNOSIS — E78.2 MIXED HYPERLIPIDEMIA: ICD-10-CM

## 2025-01-24 DIAGNOSIS — E78.5 HYPERLIPIDEMIA, UNSPECIFIED HYPERLIPIDEMIA TYPE: ICD-10-CM

## 2025-01-24 DIAGNOSIS — E66.01 CLASS 3 SEVERE OBESITY WITH SERIOUS COMORBIDITY AND BODY MASS INDEX (BMI) OF 40.0 TO 44.9 IN ADULT, UNSPECIFIED OBESITY TYPE: ICD-10-CM

## 2025-01-24 NOTE — TELEPHONE ENCOUNTER
Rx Refill Note  Requested Prescriptions     Pending Prescriptions Disp Refills    Tirzepatide-Weight Management (ZEPBOUND) 2.5 MG/0.5ML solution auto-injector 2 mL 0     Sig: Inject 0.5 mL under the skin into the appropriate area as directed 1 (One) Time Per Week.      Last office visit with prescribing clinician: 1/17/2025   Last telemedicine visit with prescribing clinician: Visit date not found   Next office visit with prescribing clinician: Visit date not found                         Would you like a call back once the refill request has been completed: [] Yes [] No    If the office needs to give you a call back, can they leave a voicemail: [] Yes [] No    Román Chance CMA/LMR  01/24/25, 09:43 EST

## 2025-01-24 NOTE — TELEPHONE ENCOUNTER
Caller: Hoang So    Relationship: Self    Best call back number: [phone]      Requested Prescriptions:   Requested Prescriptions     Pending Prescriptions Disp Refills    Tirzepatide-Weight Management (ZEPBOUND) 2.5 MG/0.5ML solution auto-injector 2 mL 0     Sig: Inject 0.5 mL under the skin into the appropriate area as directed 1 (One) Time Per Week.        Pharmacy where request should be sent: [facility] [id number] [address] [phone] [facility] [phone] [facility]     Last office visit with prescribing clinician: 1/17/2025   Last telemedicine visit with prescribing clinician: Visit date not found   Next office visit with prescribing clinician: Visit date not found     Additional details provided by patient: THEY DID NOT RECEIVE THE FIRST REQUEST    Does the patient have less than a 3 day supply:  [x] Yes  [] No    Would you like a call back once the refill request has been completed: [] Yes [x] No    If the office needs to give you a call back, can they leave a voicemail: [] Yes [x] No    Whitley Jacobs Rep   01/24/25 09:33 EST

## 2025-02-06 ENCOUNTER — TELEPHONE (OUTPATIENT)
Dept: FAMILY MEDICINE CLINIC | Facility: CLINIC | Age: 70
End: 2025-02-06
Payer: MEDICARE

## 2025-02-06 NOTE — TELEPHONE ENCOUNTER
Caller: RICO    Relationship: Other    Best call back number: [phone]     What is the best time to reach you: 8:30 AM TO 4:00 PM    Do you know the name of the person who called: VICKIE    What was the call regarding: RICO STATED THEY RE-FAXED THEIR REQUEST FOR CPAP PRESCRIPTION RENEWAL ON 01-.    RICO ARE REQUESTING TO KNOW THE STATUS OF THIS.    RICO STATED THE START DATE OF THE CURRENT PRESCRIPTION IS 07-.    PLEASE CALL TO DISCUSS.

## 2025-02-06 NOTE — TELEPHONE ENCOUNTER
We have not received paperwork.  I returned [facility]'s call and spoke jh Larios.   They will refax paperwork now. If we do not receive it by end of day today I will callback.

## 2025-02-15 ENCOUNTER — PRIOR AUTHORIZATION (OUTPATIENT)
Dept: FAMILY MEDICINE CLINIC | Facility: CLINIC | Age: 70
End: 2025-02-15
Payer: MEDICARE

## 2025-02-15 DIAGNOSIS — E78.2 MIXED HYPERLIPIDEMIA: ICD-10-CM

## 2025-02-15 RX ORDER — PRAVASTATIN SODIUM 20 MG
20 TABLET ORAL DAILY
Qty: 90 TABLET | Refills: 0 | Status: SHIPPED | OUTPATIENT
Start: 2025-02-15

## 2025-02-15 NOTE — TELEPHONE ENCOUNTER
PA sent to plan via [facility]  Tirzepatide-Weight Management (ZEPBOUND) 2.5 MG/0.5ML solution auto-injector

## 2025-02-25 ENCOUNTER — TELEPHONE (OUTPATIENT)
Dept: FAMILY MEDICINE CLINIC | Facility: CLINIC | Age: 70
End: 2025-02-25
Payer: MEDICARE

## 2025-02-25 NOTE — TELEPHONE ENCOUNTER
Caller: MAYO    Relationship: Other    Best call back number: [phone]    What form or medical record are you requesting: NEEDS THE PRESCRIPTION FOR CPAP ORDERS SENT OVER JUST RECEIVED THE OFFICE NOTES     How would you like to receive the form or medical records (pick-up, mail, fax): FAX  If fax, what is the fax number: [phone]

## 2025-02-28 ENCOUNTER — TELEPHONE (OUTPATIENT)
Dept: FAMILY MEDICINE CLINIC | Facility: CLINIC | Age: 70
End: 2025-02-28
Payer: MEDICARE

## 2025-02-28 DIAGNOSIS — G47.33 OSA (OBSTRUCTIVE SLEEP APNEA): ICD-10-CM

## 2025-02-28 DIAGNOSIS — E78.5 HYPERLIPIDEMIA, UNSPECIFIED HYPERLIPIDEMIA TYPE: ICD-10-CM

## 2025-02-28 DIAGNOSIS — E78.2 MIXED HYPERLIPIDEMIA: ICD-10-CM

## 2025-02-28 DIAGNOSIS — E66.813 CLASS 3 SEVERE OBESITY WITH SERIOUS COMORBIDITY AND BODY MASS INDEX (BMI) OF 40.0 TO 44.9 IN ADULT, UNSPECIFIED OBESITY TYPE: ICD-10-CM

## 2025-02-28 DIAGNOSIS — E66.01 CLASS 3 SEVERE OBESITY WITH SERIOUS COMORBIDITY AND BODY MASS INDEX (BMI) OF 40.0 TO 44.9 IN ADULT, UNSPECIFIED OBESITY TYPE: ICD-10-CM

## 2025-02-28 NOTE — TELEPHONE ENCOUNTER
Are you increasing pt's Zepbound?   If so, pt reports he has one week left of current 2.5 MG rx  PA will likely be needed with each additional dose titration    If not, pt is requesting rx w additional refills   Please advise

## 2025-02-28 NOTE — TELEPHONE ENCOUNTER
Pt scheduled for April. He called the pharmacy and they did not have the medication. Pt has questions about medication and wishes to speak to you.

## 2025-02-28 NOTE — TELEPHONE ENCOUNTER
Paper CPAP order faxed.   I did note if there is a specific rx form that is needed to please send form and specify

## 2025-02-28 NOTE — TELEPHONE ENCOUNTER
He need to schedule 3 month follow-up. I can increase to the 5 mg dosing if he is tolerating well.

## 2025-02-28 NOTE — TELEPHONE ENCOUNTER
Caller: Hoang So    Relationship: Self    Best call back number: [phone]     What was the call regarding: PATIENT IS CALLING TO CHECK ON HIS ZEPBOUND PRESCRIPTION. PATIENT STATED THAT HE RECEIVED A LETTER THAT IT IS APPROVED THROUGH DECEMBER 2025. PATIENT CHECKED WITH HIS PHARMACY AND THEY DID NOT HAVE ANY MORE REFILLS. PATIENT IS ASKING IF THE DOSAGE IS GOING TO INCREASE FOR EACH REFILL AND IF THEY WILL BE SENT IN AUTOMATICALLY OR IF HE WILL NEED TO CALL EACH MONTH. PATIENT HAS A WEEK LEFT OF HIS MEDICATION. PLEASE ADVISE.

## 2025-03-03 ENCOUNTER — TELEPHONE (OUTPATIENT)
Dept: FAMILY MEDICINE CLINIC | Facility: CLINIC | Age: 70
End: 2025-03-03
Payer: MEDICARE

## 2025-03-03 NOTE — TELEPHONE ENCOUNTER
I called pharmacy to obtain clarification regarding Zepbound rx prior to contacting pt.   Per pharmacy:  - On 2/17/25 pt filled a 1M supply of 2.5 MG   - On 2/28/25 pharmacy received escribe of 5 MG rx, however the medication was out-of-stock and had to be ordered. Pharmacy estimates this will arrive today around 1100.  - 5 MG will not require a new PA and pt will be able to pick-up a 3M supply for $110 when medication is available

## 2025-03-03 NOTE — TELEPHONE ENCOUNTER
I called and spoke w pt. Pt was confused stating he did not need further clarification.   I did provided the info obtained from pharmacy and pt v/u   Pt scheduled w PCP 4/17/25 and will discuss future titration schedule at that time

## 2025-03-03 NOTE — TELEPHONE ENCOUNTER
Received notice from [facility] Medicare Part D Pharmacy Review of denial for (WEGOVY) Semaglutide-Weight Management 0.25 MG/0.5ML solution auto-injector     PATIENT IS CURRENTLY PRESCRIBED ZEPBOUND AND SHOULD NOT BE TAKING ZEPBOUD WITH ANY OTHER GLP-1

## 2025-03-20 ENCOUNTER — TELEPHONE (OUTPATIENT)
Dept: FAMILY MEDICINE CLINIC | Facility: CLINIC | Age: 70
End: 2025-03-20

## 2025-03-20 NOTE — TELEPHONE ENCOUNTER
Caller: Hoang So    Relationship: Self    Best call back number: [phone]     What medication are you requesting: COUGH MEDICATION     What are your current symptoms: CONGESTION AND DEEP COUGH     How long have you been experiencing symptoms: 1 WEEK     Have you had these symptoms before:    [] Yes  [x] No    Have you been treated for these symptoms before:   [] Yes  [x] No    If a prescription is needed, what is your preferred pharmacy and phone number: [facility] [id number] - [place] [id number] [facility] [phone] [facility] [phone]      Additional notes:

## 2025-03-20 NOTE — TELEPHONE ENCOUNTER
Caller: Hoang So    Relationship: Self    Best call back number: [phone]     What is the best time to reach you: ASAP    What was the call regarding: PATIENT IS REQUESTING A RESPONSE ASAP, AND WOULD LIKE MEDICATION PRESCRIBED.    PLEASE CALL PATIENT TO ADVISE

## 2025-03-24 ENCOUNTER — OFFICE VISIT (OUTPATIENT)
Age: 70
End: 2025-03-24
Payer: MEDICARE

## 2025-03-24 ENCOUNTER — HOSPITAL ENCOUNTER (OUTPATIENT)
Dept: CARDIOLOGY | Facility: HOSPITAL | Age: 70
Discharge: HOME | End: 2025-03-24
Admitting: INTERNAL MEDICINE
Payer: MEDICARE

## 2025-03-24 VITALS
SYSTOLIC BLOOD PRESSURE: 140 MMHG | HEART RATE: 74 BPM | DIASTOLIC BLOOD PRESSURE: 90 MMHG | WEIGHT: 304 LBS | OXYGEN SATURATION: 98 % | HEIGHT: 72 IN | BODY MASS INDEX: 41.17 KG/M2

## 2025-03-24 VITALS
DIASTOLIC BLOOD PRESSURE: 70 MMHG | SYSTOLIC BLOOD PRESSURE: 112 MMHG | BODY MASS INDEX: 39.44 KG/M2 | WEIGHT: 291.2 LBS | HEART RATE: 71 BPM | HEIGHT: 72 IN

## 2025-03-24 DIAGNOSIS — I10 HYPERTENSION, UNSPECIFIED TYPE: ICD-10-CM

## 2025-03-24 DIAGNOSIS — I48.0 PAROXYSMAL ATRIAL FIBRILLATION: ICD-10-CM

## 2025-03-24 DIAGNOSIS — I48.19 PERSISTENT ATRIAL FIBRILLATION: Primary | ICD-10-CM

## 2025-03-24 DIAGNOSIS — E66.01 MORBID OBESITY: ICD-10-CM

## 2025-03-24 DIAGNOSIS — I10 BENIGN ESSENTIAL HYPERTENSION: ICD-10-CM

## 2025-03-24 LAB
AORTIC ARCH: 2.3 CM
AORTIC DIMENSIONLESS INDEX: 0.5 (DI)
ASCENDING AORTA: 4.3 CM
AV MEAN PRESS GRAD SYS DOP V1V2: 21.3 MMHG
AV VMAX SYS DOP: 300.8 CM/SEC
BH CV ECHO MEAS - ACS: 1.77 CM
BH CV ECHO MEAS - AO MAX PG: 36.2 MMHG
BH CV ECHO MEAS - AO ROOT DIAM: 3.8 CM
BH CV ECHO MEAS - AO V2 VTI: 63.4 CM
BH CV ECHO MEAS - AVA(I,D): 1.4 CM2
BH CV ECHO MEAS - EDV(CUBED): 79.5 ML
BH CV ECHO MEAS - EDV(MOD-SP2): 132 ML
BH CV ECHO MEAS - EDV(MOD-SP4): 119 ML
BH CV ECHO MEAS - EF(MOD-SP2): 73.5 %
BH CV ECHO MEAS - EF(MOD-SP4): 73.9 %
BH CV ECHO MEAS - ESV(CUBED): 10.5 ML
BH CV ECHO MEAS - ESV(MOD-SP2): 35 ML
BH CV ECHO MEAS - ESV(MOD-SP4): 31 ML
BH CV ECHO MEAS - FS: 49.1 %
BH CV ECHO MEAS - IVS/LVPW: 1 CM
BH CV ECHO MEAS - IVSD: 1.4 CM
BH CV ECHO MEAS - LAT PEAK E' VEL: 6.3 CM/SEC
BH CV ECHO MEAS - LV DIASTOLIC VOL/BSA (35-75): 46.8 CM2
BH CV ECHO MEAS - LV MASS(C)D: 232.2 GRAMS
BH CV ECHO MEAS - LV MAX PG: 7.4 MMHG
BH CV ECHO MEAS - LV MEAN PG: 4.8 MMHG
BH CV ECHO MEAS - LV SYSTOLIC VOL/BSA (12-30): 12.2 CM2
BH CV ECHO MEAS - LV V1 MAX: 136.3 CM/SEC
BH CV ECHO MEAS - LV V1 VTI: 27.5 CM
BH CV ECHO MEAS - LVIDD: 4.3 CM
BH CV ECHO MEAS - LVIDS: 2.19 CM
BH CV ECHO MEAS - LVOT AREA: 3.3 CM2
BH CV ECHO MEAS - LVOT DIAM: 2.04 CM
BH CV ECHO MEAS - LVPWD: 1.4 CM
BH CV ECHO MEAS - MED PEAK E' VEL: 7.1 CM/SEC
BH CV ECHO MEAS - MV A DUR: 0.13 SEC
BH CV ECHO MEAS - MV A MAX VEL: 95.1 CM/SEC
BH CV ECHO MEAS - MV DEC SLOPE: 327.5 CM/SEC2
BH CV ECHO MEAS - MV DEC TIME: 0.31 SEC
BH CV ECHO MEAS - MV E MAX VEL: 55.7 CM/SEC
BH CV ECHO MEAS - MV E/A: 0.59
BH CV ECHO MEAS - MV MAX PG: 3.5 MMHG
BH CV ECHO MEAS - MV MEAN PG: 1.32 MMHG
BH CV ECHO MEAS - MV P1/2T: 64.2 MSEC
BH CV ECHO MEAS - MV V2 VTI: 21.8 CM
BH CV ECHO MEAS - MVA(P1/2T): 3.4 CM2
BH CV ECHO MEAS - MVA(VTI): 4.1 CM2
BH CV ECHO MEAS - PA ACC TIME: 0.12 SEC
BH CV ECHO MEAS - PA V2 MAX: 148 CM/SEC
BH CV ECHO MEAS - QP/QS: 0.84
BH CV ECHO MEAS - RAP SYSTOLE: 3 MMHG
BH CV ECHO MEAS - RV MAX PG: 3.4 MMHG
BH CV ECHO MEAS - RV V1 MAX: 92.1 CM/SEC
BH CV ECHO MEAS - RV V1 VTI: 20 CM
BH CV ECHO MEAS - RVOT DIAM: 2.19 CM
BH CV ECHO MEAS - RVSP: 31 MMHG
BH CV ECHO MEAS - SUP REN AO DIAM: 2.1 CM
BH CV ECHO MEAS - SV(LVOT): 90 ML
BH CV ECHO MEAS - SV(MOD-SP2): 97 ML
BH CV ECHO MEAS - SV(MOD-SP4): 88 ML
BH CV ECHO MEAS - SV(RVOT): 75.5 ML
BH CV ECHO MEAS - SVI(LVOT): 35.3 ML/M2
BH CV ECHO MEAS - SVI(MOD-SP2): 38.1 ML/M2
BH CV ECHO MEAS - SVI(MOD-SP4): 34.6 ML/M2
BH CV ECHO MEAS - TAPSE (>1.6): 2.08 CM
BH CV ECHO MEAS - TR MAX PG: 28 MMHG
BH CV ECHO MEAS - TR MAX VEL: 264.7 CM/SEC
BH CV ECHO MEASUREMENTS AVERAGE E/E' RATIO: 8.31
BH CV XLRA - RV BASE: 3.6 CM
BH CV XLRA - RV LENGTH: 7.6 CM
BH CV XLRA - RV MID: 2.9 CM
BH CV XLRA - TDI S': 15.3 CM/SEC
LEFT ATRIUM VOLUME INDEX: 27.9 ML/M2
LV EF BIPLANE MOD: 73.9 %
SINUS: 3.4 CM
STJ: 2.39 CM

## 2025-03-24 PROCEDURE — 25510000001 PERFLUTREN 6.52 MG/ML SUSPENSION 2 ML VIAL: Performed by: INTERNAL MEDICINE

## 2025-03-24 PROCEDURE — 3074F SYST BP LT 130 MM HG: CPT | Performed by: INTERNAL MEDICINE

## 2025-03-24 PROCEDURE — 93306 TTE W/DOPPLER COMPLETE: CPT

## 2025-03-24 PROCEDURE — 99214 OFFICE O/P EST MOD 30 MIN: CPT | Performed by: INTERNAL MEDICINE

## 2025-03-24 PROCEDURE — 93000 ELECTROCARDIOGRAM COMPLETE: CPT | Performed by: INTERNAL MEDICINE

## 2025-03-24 PROCEDURE — 3078F DIAST BP <80 MM HG: CPT | Performed by: INTERNAL MEDICINE

## 2025-03-24 PROCEDURE — 93306 TTE W/DOPPLER COMPLETE: CPT | Performed by: INTERNAL MEDICINE

## 2025-03-24 RX ADMIN — PERFLUTREN 1.5 ML: 6.52 INJECTION, SUSPENSION INTRAVENOUS at 08:52
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
## 2025-04-17 ENCOUNTER — OFFICE VISIT (OUTPATIENT)
Dept: FAMILY MEDICINE CLINIC | Facility: CLINIC | Age: 70
End: 2025-04-17
Payer: MEDICARE

## 2025-04-17 ENCOUNTER — TELEPHONE (OUTPATIENT)
Dept: FAMILY MEDICINE CLINIC | Facility: CLINIC | Age: 70
End: 2025-04-17

## 2025-04-17 VITALS
WEIGHT: 290.8 LBS | TEMPERATURE: 98.6 F | BODY MASS INDEX: 39.39 KG/M2 | OXYGEN SATURATION: 96 % | HEIGHT: 72 IN | SYSTOLIC BLOOD PRESSURE: 110 MMHG | DIASTOLIC BLOOD PRESSURE: 72 MMHG | HEART RATE: 74 BPM

## 2025-04-17 DIAGNOSIS — G47.33 OSA (OBSTRUCTIVE SLEEP APNEA): ICD-10-CM

## 2025-04-17 DIAGNOSIS — E66.01 CLASS 3 SEVERE OBESITY WITH SERIOUS COMORBIDITY AND BODY MASS INDEX (BMI) OF 40.0 TO 44.9 IN ADULT, UNSPECIFIED OBESITY TYPE: Primary | ICD-10-CM

## 2025-04-17 DIAGNOSIS — E78.5 HYPERLIPIDEMIA, UNSPECIFIED HYPERLIPIDEMIA TYPE: ICD-10-CM

## 2025-04-17 DIAGNOSIS — E66.813 CLASS 3 SEVERE OBESITY WITH SERIOUS COMORBIDITY AND BODY MASS INDEX (BMI) OF 40.0 TO 44.9 IN ADULT, UNSPECIFIED OBESITY TYPE: Primary | ICD-10-CM

## 2025-04-17 DIAGNOSIS — E78.2 MIXED HYPERLIPIDEMIA: ICD-10-CM

## 2025-04-17 NOTE — TELEPHONE ENCOUNTER
Pharmacy Name:  [facility]     Reference Number (if applicable):     Pharmacy representative name: RENNY    Pharmacy representative phone number: [phone]     What medication are you calling in regards to: ZEPBOUND    What question does the pharmacy have: PHARMACY CALLING TO VERIFY WHAT DOSAGE THAT PATIENT IS SUPPOSE TO BE TAKING THEY RECEIVED A PRESCRIPTION OF ZEPBOUND 10MG AND 12.5MG.     HE STATES USUALLY THE NEXT DOSAGE AFTER TAKING 5MG IS 7.5MG     Who is the provider that prescribed the medication: NATASHA DENTON    Additional notes:

## 2025-04-17 NOTE — ASSESSMENT & PLAN NOTE
Patient's (Body mass index is 39.44 kg/m².) indicates that they are morbidly/severely obese (BMI > 40 or > 35 with obesity - related health condition) with health conditions that include obstructive sleep apnea, hypertension, impaired fasting glucose, dyslipidemias, and osteoarthritis . Weight is improving with treatment. BMI  is above average; BMI management plan is completed. We discussed portion control, increasing exercise, and pharmacologic options including Zepbound .     Tolerating Zepbound well. Patient has not been taking as prescribed and taking every 6 days. Education provided.     Plan:  - Increase Zepbound to 10 mg once weekly x 4 weeks, then   - If tolerating well, increase Zepbound to 12.5 mg subcutaneous infection once weekly for 4 weeks.

## 2025-04-17 NOTE — PROGRESS NOTES
"Chief Complaint  Hyperlipidemia (Check up follow up on weight) and Obesity    Subjective        Hoang So presents to [facility]  History of Present Illness  Hoang So is a 69 y.o. male who presents to the clinic today to follow-up on weight.  He was prescribed Zepbound for obesity.  History of prediabetes.  Hemoglobin A1c 6.8 in January 2025. He has not had any side effects. He is watching what he eats and has not had any alcohol use.     Weight:  February 2025: 304 lbs.   April 2025: 290 lbs.     He has a history of hyperlipidemia.  Lipid panel last evaluated January 2025.  LDL below 80.  Objective   Vital Signs:  /72 (BP Location: Right arm, Patient Position: Sitting, Cuff Size: Adult)   Pulse 74   Temp 98.6 °F (37 °C)   Ht 182.9 cm (72\")   Wt 132 kg (290 lb 12.8 oz)   SpO2 96%   BMI 39.44 kg/m²   Estimated body mass index is 39.44 kg/m² as calculated from the following:    Height as of this encounter: 182.9 cm (72\").    Weight as of this encounter: 132 kg (290 lb 12.8 oz).        Physical Exam  Vitals reviewed.   Constitutional:       General: He is not in acute distress.     Appearance: Normal appearance. He is not ill-appearing, toxic-appearing or diaphoretic.   HENT:      Head: Normocephalic and atraumatic.   Eyes:      General: No scleral icterus.        Right eye: No discharge.         Left eye: No discharge.      Extraocular Movements: Extraocular movements intact.   Pulmonary:      Effort: Pulmonary effort is normal. No respiratory distress.   Neurological:      General: No focal deficit present.      Mental Status: He is alert and oriented to person, place, and time.      Motor: No weakness.      Gait: Gait normal.   Psychiatric:         Mood and Affect: Mood normal.         Behavior: Behavior normal.        Result Review :    Common labs          1/14/2025    08:32   Common Labs   Glucose 116    BUN 18    Creatinine 1.20    Sodium 140    Potassium 4.2  "   Chloride 104    Calcium 9.8    Albumin 4.2    Total Bilirubin 0.5    Alkaline Phosphatase 98    AST (SGOT) 21    ALT (SGPT) 31    WBC 7.99    Hemoglobin 16.3    Hematocrit 47.6    Platelets 227    Total Cholesterol 136    Triglycerides 153    HDL Cholesterol 33    LDL Cholesterol  76    Hemoglobin A1C 5.80    PSA 0.892                Assessment and Plan   Diagnoses and all orders for this visit:    1. Class 3 severe obesity with serious comorbidity and body mass index (BMI) of 40.0 to 44.9 in adult, unspecified obesity type (Primary)  Assessment & Plan:  Patient's (Body mass index is 39.44 kg/m².) indicates that they are morbidly/severely obese (BMI > 40 or > 35 with obesity - related health condition) with health conditions that include obstructive sleep apnea, hypertension, impaired fasting glucose, dyslipidemias, and osteoarthritis . Weight is improving with treatment. BMI  is above average; BMI management plan is completed. We discussed portion control, increasing exercise, and pharmacologic options including Zepbound .     Tolerating Zepbound well. Patient has not been taking as prescribed and taking every 6 days. Education provided.     Plan:  - Increase Zepbound to 10 mg once weekly x 4 weeks, then   - If tolerating well, increase Zepbound to 12.5 mg subcutaneous infection once weekly for 4 weeks.     Orders:  -     Tirzepatide-Weight Management (ZEPBOUND) 10 MG/0.5ML solution auto-injector; Inject 0.5 mL under the skin into the appropriate area as directed 1 (One) Time Per Week.  Dispense: 2 mL; Refill: 0  -     Tirzepatide-Weight Management (ZEPBOUND) 12.5 MG/0.5ML solution auto-injector; Inject 0.5 mL under the skin into the appropriate area as directed 1 (One) Time Per Week.  Dispense: 2 mL; Refill: 0    2. SULLY (obstructive sleep apnea)  -     Tirzepatide-Weight Management (ZEPBOUND) 10 MG/0.5ML solution auto-injector; Inject 0.5 mL under the skin into the appropriate area as directed 1 (One) Time Per  Week.  Dispense: 2 mL; Refill: 0  -     Tirzepatide-Weight Management (ZEPBOUND) 12.5 MG/0.5ML solution auto-injector; Inject 0.5 mL under the skin into the appropriate area as directed 1 (One) Time Per Week.  Dispense: 2 mL; Refill: 0    3. Mixed hyperlipidemia  -     Tirzepatide-Weight Management (ZEPBOUND) 10 MG/0.5ML solution auto-injector; Inject 0.5 mL under the skin into the appropriate area as directed 1 (One) Time Per Week.  Dispense: 2 mL; Refill: 0  -     Tirzepatide-Weight Management (ZEPBOUND) 12.5 MG/0.5ML solution auto-injector; Inject 0.5 mL under the skin into the appropriate area as directed 1 (One) Time Per Week.  Dispense: 2 mL; Refill: 0    4. Hyperlipidemia, unspecified hyperlipidemia type  -     Tirzepatide-Weight Management (ZEPBOUND) 10 MG/0.5ML solution auto-injector; Inject 0.5 mL under the skin into the appropriate area as directed 1 (One) Time Per Week.  Dispense: 2 mL; Refill: 0  -     Tirzepatide-Weight Management (ZEPBOUND) 12.5 MG/0.5ML solution auto-injector; Inject 0.5 mL under the skin into the appropriate area as directed 1 (One) Time Per Week.  Dispense: 2 mL; Refill: 0             Follow Up   Return in about 3 months (around 7/17/2025) for Recheck- weight loss- labs 1-2 weeks prior.  Patient was given instructions and counseling regarding his condition or for health maintenance advice. Please see specific information pulled into the AVS if appropriate.         Electronically signed by ASAEL Hilario, 04/17/25, 9:08 AM EDT.

## 2025-05-01 ENCOUNTER — TELEPHONE (OUTPATIENT)
Dept: FAMILY MEDICINE CLINIC | Facility: CLINIC | Age: 70
End: 2025-05-01

## 2025-05-01 DIAGNOSIS — E78.5 HYPERLIPIDEMIA, UNSPECIFIED HYPERLIPIDEMIA TYPE: ICD-10-CM

## 2025-05-01 RX ORDER — EZETIMIBE 10 MG/1
10 TABLET ORAL DAILY
Qty: 90 TABLET | Refills: 0 | Status: SHIPPED | OUTPATIENT
Start: 2025-05-01

## 2025-05-01 NOTE — TELEPHONE ENCOUNTER
Caller: Hoang So    Relationship: Self    Best call back number: [phone]    What medication are you requesting:ezetimibe (ZETIA) 10 MG tablet      What are your current symptoms:     How long have you been experiencing symptoms:     Have you had these symptoms before:    [] Yes  [] No    Have you been treated for these symptoms before:   [] Yes  [] No    If a prescription is needed, what is your preferred pharmacy and phone number: [facility] [id number] - [place] [id number] [facility] AT [facility] [phone] [facility] [phone] FX     Additional notes: PATIENT IS CALLING IN TO ASK FOR A REFILL ON HIS ezetimibe (ZETIA) 10 MG tablet BUT THIS WAS NOT PRESCRIBED BY DR DENTON.  HE IS ASKING FOR A 90 DAY SUPPLY.

## 2025-05-13 DIAGNOSIS — G47.33 OSA (OBSTRUCTIVE SLEEP APNEA): ICD-10-CM

## 2025-05-13 DIAGNOSIS — E78.5 HYPERLIPIDEMIA, UNSPECIFIED HYPERLIPIDEMIA TYPE: ICD-10-CM

## 2025-05-13 DIAGNOSIS — E66.01 CLASS 3 SEVERE OBESITY WITH SERIOUS COMORBIDITY AND BODY MASS INDEX (BMI) OF 40.0 TO 44.9 IN ADULT, UNSPECIFIED OBESITY TYPE: ICD-10-CM

## 2025-05-13 DIAGNOSIS — E78.2 MIXED HYPERLIPIDEMIA: ICD-10-CM

## 2025-05-13 DIAGNOSIS — E66.813 CLASS 3 SEVERE OBESITY WITH SERIOUS COMORBIDITY AND BODY MASS INDEX (BMI) OF 40.0 TO 44.9 IN ADULT, UNSPECIFIED OBESITY TYPE: ICD-10-CM

## 2025-05-13 RX ORDER — PRAVASTATIN SODIUM 20 MG
20 TABLET ORAL DAILY
Qty: 90 TABLET | Refills: 0 | Status: SHIPPED | OUTPATIENT
Start: 2025-05-13

## 2025-05-13 RX ORDER — TIRZEPATIDE 10 MG/.5ML
INJECTION, SOLUTION SUBCUTANEOUS
Qty: 2 ML | Refills: 0 | Status: SHIPPED | OUTPATIENT
Start: 2025-05-13

## 2025-05-13 NOTE — TELEPHONE ENCOUNTER
Rx Refill Note  Requested Prescriptions     Pending Prescriptions Disp Refills    Zepbound 10 MG/0.5ML solution auto-injector [Pharmacy Med Name: ZEPBOUND 10 MG/0.5 ML PEN] 2 mL 0     Sig: INJECT 10 MG UNDER THE SKIN ONCE WEEKLY    pravastatin (PRAVACHOL) 20 MG tablet [Pharmacy Med Name: PRAVASTATIN SODIUM 20 MG TAB] 90 tablet 0     Sig: TAKE 1 TABLET BY MOUTH DAILY      Last office visit with prescribing clinician: 4/17/2025   Last telemedicine visit with prescribing clinician: Visit date not found   Next office visit with prescribing clinician: 6/5/2025                         Would you like a call back once the refill request has been completed: [] Yes [] No    If the office needs to give you a call back, can they leave a voicemail: [] Yes [] No    Román Chance CMA/ELIANE  05/13/25, 07:49 EDT

## 2025-05-20 ENCOUNTER — PREP FOR SURGERY (OUTPATIENT)
Dept: OTHER | Facility: HOSPITAL | Age: 70
End: 2025-05-20
Payer: MEDICARE

## 2025-05-20 DIAGNOSIS — Z86.0100 HISTORY OF COLON POLYPS: Primary | ICD-10-CM

## 2025-05-21 PROBLEM — Z86.0100 HISTORY OF COLON POLYPS: Status: ACTIVE | Noted: 2025-05-20

## 2025-05-22 DIAGNOSIS — E78.2 MIXED HYPERLIPIDEMIA: ICD-10-CM

## 2025-05-22 DIAGNOSIS — E66.813 CLASS 3 SEVERE OBESITY WITH SERIOUS COMORBIDITY AND BODY MASS INDEX (BMI) OF 40.0 TO 44.9 IN ADULT, UNSPECIFIED OBESITY TYPE: ICD-10-CM

## 2025-05-22 DIAGNOSIS — E66.01 CLASS 3 SEVERE OBESITY WITH SERIOUS COMORBIDITY AND BODY MASS INDEX (BMI) OF 40.0 TO 44.9 IN ADULT, UNSPECIFIED OBESITY TYPE: ICD-10-CM

## 2025-05-22 DIAGNOSIS — E78.5 HYPERLIPIDEMIA, UNSPECIFIED HYPERLIPIDEMIA TYPE: ICD-10-CM

## 2025-05-22 DIAGNOSIS — G47.33 OSA (OBSTRUCTIVE SLEEP APNEA): ICD-10-CM

## 2025-05-22 RX ORDER — TIRZEPATIDE 5 MG/.5ML
INJECTION, SOLUTION SUBCUTANEOUS
Qty: 6 ML | Refills: 0 | Status: SHIPPED | OUTPATIENT
Start: 2025-05-22

## 2025-06-07 DIAGNOSIS — E66.813 CLASS 3 SEVERE OBESITY WITH SERIOUS COMORBIDITY AND BODY MASS INDEX (BMI) OF 40.0 TO 44.9 IN ADULT, UNSPECIFIED OBESITY TYPE: ICD-10-CM

## 2025-06-07 DIAGNOSIS — E66.01 CLASS 3 SEVERE OBESITY WITH SERIOUS COMORBIDITY AND BODY MASS INDEX (BMI) OF 40.0 TO 44.9 IN ADULT, UNSPECIFIED OBESITY TYPE: ICD-10-CM

## 2025-06-07 DIAGNOSIS — E78.5 HYPERLIPIDEMIA, UNSPECIFIED HYPERLIPIDEMIA TYPE: ICD-10-CM

## 2025-06-07 DIAGNOSIS — G47.33 OSA (OBSTRUCTIVE SLEEP APNEA): ICD-10-CM

## 2025-06-07 DIAGNOSIS — E78.2 MIXED HYPERLIPIDEMIA: ICD-10-CM

## 2025-06-09 RX ORDER — TIRZEPATIDE 12.5 MG/.5ML
INJECTION, SOLUTION SUBCUTANEOUS
Qty: 2 ML | Refills: 0 | Status: SHIPPED | OUTPATIENT
Start: 2025-06-09

## 2025-06-09 RX ORDER — TIRZEPATIDE 10 MG/.5ML
INJECTION, SOLUTION SUBCUTANEOUS
Qty: 2 ML | Refills: 0 | Status: SHIPPED | OUTPATIENT
Start: 2025-06-09

## 2025-06-26 ENCOUNTER — OFFICE VISIT (OUTPATIENT)
Dept: FAMILY MEDICINE CLINIC | Facility: CLINIC | Age: 70
End: 2025-06-26
Payer: MEDICARE

## 2025-06-26 VITALS
DIASTOLIC BLOOD PRESSURE: 72 MMHG | SYSTOLIC BLOOD PRESSURE: 114 MMHG | OXYGEN SATURATION: 98 % | HEIGHT: 72 IN | TEMPERATURE: 98.1 F | WEIGHT: 281 LBS | HEART RATE: 77 BPM | BODY MASS INDEX: 38.06 KG/M2

## 2025-06-26 DIAGNOSIS — Z13.228 SCREENING FOR ENDOCRINE, NUTRITIONAL, METABOLIC AND IMMUNITY DISORDER: ICD-10-CM

## 2025-06-26 DIAGNOSIS — Z13.21 SCREENING FOR ENDOCRINE, NUTRITIONAL, METABOLIC AND IMMUNITY DISORDER: ICD-10-CM

## 2025-06-26 DIAGNOSIS — E66.813 CLASS 3 SEVERE OBESITY WITH SERIOUS COMORBIDITY AND BODY MASS INDEX (BMI) OF 40.0 TO 44.9 IN ADULT, UNSPECIFIED OBESITY TYPE: Primary | ICD-10-CM

## 2025-06-26 DIAGNOSIS — R73.03 PREDIABETES: ICD-10-CM

## 2025-06-26 DIAGNOSIS — Z13.29 SCREENING FOR THYROID DISORDER: ICD-10-CM

## 2025-06-26 DIAGNOSIS — G47.33 OSA (OBSTRUCTIVE SLEEP APNEA): ICD-10-CM

## 2025-06-26 DIAGNOSIS — Z13.0 SCREENING FOR ENDOCRINE, NUTRITIONAL, METABOLIC AND IMMUNITY DISORDER: ICD-10-CM

## 2025-06-26 DIAGNOSIS — Z13.29 SCREENING FOR ENDOCRINE, NUTRITIONAL, METABOLIC AND IMMUNITY DISORDER: ICD-10-CM

## 2025-06-26 DIAGNOSIS — E78.2 MIXED HYPERLIPIDEMIA: ICD-10-CM

## 2025-06-26 DIAGNOSIS — Z13.0 SCREENING FOR IRON DEFICIENCY ANEMIA: ICD-10-CM

## 2025-06-26 PROCEDURE — 3074F SYST BP LT 130 MM HG: CPT | Performed by: NURSE PRACTITIONER

## 2025-06-26 PROCEDURE — 1160F RVW MEDS BY RX/DR IN RCRD: CPT | Performed by: NURSE PRACTITIONER

## 2025-06-26 PROCEDURE — 1159F MED LIST DOCD IN RCRD: CPT | Performed by: NURSE PRACTITIONER

## 2025-06-26 PROCEDURE — 99213 OFFICE O/P EST LOW 20 MIN: CPT | Performed by: NURSE PRACTITIONER

## 2025-06-26 PROCEDURE — 1125F AMNT PAIN NOTED PAIN PRSNT: CPT | Performed by: NURSE PRACTITIONER

## 2025-06-26 PROCEDURE — 3078F DIAST BP <80 MM HG: CPT | Performed by: NURSE PRACTITIONER

## 2025-06-26 NOTE — PROGRESS NOTES
"Chief Complaint  Weight Check (Fasting -4W of Zepbound 12.5 MG w no side effects )    Subjective        Hoang oS presents to [facility]  History of Present Illness  Hoang So is a 69 y.o. male who presents to the clinic today to reevaluate weight. Last visit was April 2025.  Patient has a history of obesity and sleep apnea.  He has been taking Zepbound.  His dose was increased last office visit. Labs last performed January 2025.  Elevated Zepbound 12.5 mg subcutaneous injection weekly.  It has curved his beer consumption. He has not had alcohol in 1 week. He is not craving food as much. He has limited his portion size.  No significant adverse side effects.    Weight:  February 2025: 304 lbs.   April 2025: 290 lbs  June 2025: 281 lbs.     Objective   Vital Signs:  /72   Pulse 77   Temp 98.1 °F (36.7 °C)   Ht 182.9 cm (72.01\")   Wt 127 kg (281 lb)   SpO2 98%   BMI 38.10 kg/m²   Estimated body mass index is 38.1 kg/m² as calculated from the following:    Height as of this encounter: 182.9 cm (72.01\").    Weight as of this encounter: 127 kg (281 lb).            Physical Exam  Vitals reviewed.   Constitutional:       General: He is not in acute distress.     Appearance: Normal appearance. He is not ill-appearing, toxic-appearing or diaphoretic.   HENT:      Head: Normocephalic and atraumatic.   Eyes:      General: No scleral icterus.        Right eye: No discharge.         Left eye: No discharge.      Extraocular Movements: Extraocular movements intact.   Pulmonary:      Effort: Pulmonary effort is normal. No respiratory distress.      Breath sounds: No wheezing.   Neurological:      General: No focal deficit present.      Mental Status: He is alert.      Motor: No weakness.      Gait: Gait normal.   Psychiatric:         Mood and Affect: Mood normal.         Behavior: Behavior normal.        Result Review :    Common labs          1/14/2025    08:32   Common Labs "   Glucose 116    BUN 18    Creatinine 1.20    Sodium 140    Potassium 4.2    Chloride 104    Calcium 9.8    Albumin 4.2    Total Bilirubin 0.5    Alkaline Phosphatase 98    AST (SGOT) 21    ALT (SGPT) 31    WBC 7.99    Hemoglobin 16.3    Hematocrit 47.6    Platelets 227    Total Cholesterol 136    Triglycerides 153    HDL Cholesterol 33    LDL Cholesterol  76    Hemoglobin A1C 5.80    PSA 0.892                Assessment and Plan   Diagnoses and all orders for this visit:    1. Class 3 severe obesity with serious comorbidity and body mass index (BMI) of 40.0 to 44.9 in adult, unspecified obesity type (Primary)  -     Tirzepatide-Weight Management (ZEPBOUND) 15 MG/0.5ML solution auto-injector; Inject 0.5 mL under the skin into the appropriate area as directed 1 (One) Time Per Week.  Dispense: 6 mL; Refill: 1    2. SULLY (obstructive sleep apnea)  -     Tirzepatide-Weight Management (ZEPBOUND) 15 MG/0.5ML solution auto-injector; Inject 0.5 mL under the skin into the appropriate area as directed 1 (One) Time Per Week.  Dispense: 6 mL; Refill: 1    3. Mixed hyperlipidemia  -     Lipid Panel    4. Screening for thyroid disorder  -     TSH    5. Prediabetes  -     Hemoglobin A1c    6. Screening for iron deficiency anemia  -     CBC w AUTO Differential    7. Screening for endocrine, nutritional, metabolic and immunity disorder  -     Comprehensive metabolic panel  -     Lipase      Patient is tolerating Zepbound very well.  Will increase to 15 mg subcutaneous injection weekly.  Reevaluate labs today.  Patient will call if he does experience any adverse side effects with dose increase.         Follow Up   Return in about 3 months (around 9/26/2025), or if symptoms worsen or fail to improve, for Recheck.  Patient was given instructions and counseling regarding his condition or for health maintenance advice. Please see specific information pulled into the AVS if appropriate.       Electronically signed by ASAEL Hilario,  06/26/25, 11:12 AM EDT.

## 2025-06-27 LAB
ALBUMIN SERPL-MCNC: 4.2 G/DL (ref 3.5–5.2)
ALBUMIN/GLOB SERPL: 1.4 G/DL
ALP SERPL-CCNC: 96 U/L (ref 39–117)
ALT SERPL-CCNC: 19 U/L (ref 1–41)
AST SERPL-CCNC: 20 U/L (ref 1–40)
BASOPHILS # BLD AUTO: 0.12 10*3/MM3 (ref 0–0.2)
BASOPHILS NFR BLD AUTO: 1.7 % (ref 0–1.5)
BILIRUB SERPL-MCNC: 0.5 MG/DL (ref 0–1.2)
BUN SERPL-MCNC: 15 MG/DL (ref 8–23)
BUN/CREAT SERPL: 12.5 (ref 7–25)
CALCIUM SERPL-MCNC: 9.6 MG/DL (ref 8.6–10.5)
CHLORIDE SERPL-SCNC: 103 MMOL/L (ref 98–107)
CHOLEST SERPL-MCNC: 131 MG/DL (ref 0–200)
CO2 SERPL-SCNC: 22 MMOL/L (ref 22–29)
CREAT SERPL-MCNC: 1.2 MG/DL (ref 0.76–1.27)
EGFRCR SERPLBLD CKD-EPI 2021: 64.7 ML/MIN/1.73
EOSINOPHIL # BLD AUTO: 0.23 10*3/MM3 (ref 0–0.4)
EOSINOPHIL NFR BLD AUTO: 3.4 % (ref 0.3–6.2)
ERYTHROCYTE [DISTWIDTH] IN BLOOD BY AUTOMATED COUNT: 13.1 % (ref 12.3–15.4)
GLOBULIN SER CALC-MCNC: 2.9 GM/DL
GLUCOSE SERPL-MCNC: 91 MG/DL (ref 65–99)
HBA1C MFR BLD: 5.4 % (ref 4.8–5.6)
HCT VFR BLD AUTO: 50.5 % (ref 37.5–51)
HDLC SERPL-MCNC: 37 MG/DL (ref 40–60)
HGB BLD-MCNC: 16.5 G/DL (ref 13–17.7)
IMM GRANULOCYTES # BLD AUTO: 0.03 10*3/MM3 (ref 0–0.05)
IMM GRANULOCYTES NFR BLD AUTO: 0.4 % (ref 0–0.5)
LDLC SERPL CALC-MCNC: 75 MG/DL (ref 0–100)
LIPASE SERPL-CCNC: 32 U/L (ref 13–60)
LYMPHOCYTES # BLD AUTO: 2.07 10*3/MM3 (ref 0.7–3.1)
LYMPHOCYTES NFR BLD AUTO: 30.2 % (ref 19.6–45.3)
MCH RBC QN AUTO: 30.3 PG (ref 26.6–33)
MCHC RBC AUTO-ENTMCNC: 32.7 G/DL (ref 31.5–35.7)
MCV RBC AUTO: 92.8 FL (ref 79–97)
MONOCYTES # BLD AUTO: 0.61 10*3/MM3 (ref 0.1–0.9)
MONOCYTES NFR BLD AUTO: 8.9 % (ref 5–12)
NEUTROPHILS # BLD AUTO: 3.8 10*3/MM3 (ref 1.7–7)
NEUTROPHILS NFR BLD AUTO: 55.4 % (ref 42.7–76)
NRBC BLD AUTO-RTO: 0 /100 WBC (ref 0–0.2)
PLATELET # BLD AUTO: 237 10*3/MM3 (ref 140–450)
POTASSIUM SERPL-SCNC: 4.6 MMOL/L (ref 3.5–5.2)
PROT SERPL-MCNC: 7.1 G/DL (ref 6–8.5)
RBC # BLD AUTO: 5.44 10*6/MM3 (ref 4.14–5.8)
SODIUM SERPL-SCNC: 137 MMOL/L (ref 136–145)
TRIGL SERPL-MCNC: 100 MG/DL (ref 0–150)
TSH SERPL DL<=0.005 MIU/L-ACNC: 1.03 UIU/ML (ref 0.27–4.2)
VLDLC SERPL CALC-MCNC: 19 MG/DL (ref 5–40)
WBC # BLD AUTO: 6.86 10*3/MM3 (ref 3.4–10.8)

## 2025-07-03 DIAGNOSIS — E66.813 CLASS 3 SEVERE OBESITY WITH SERIOUS COMORBIDITY AND BODY MASS INDEX (BMI) OF 40.0 TO 44.9 IN ADULT, UNSPECIFIED OBESITY TYPE: ICD-10-CM

## 2025-07-03 DIAGNOSIS — G47.33 OSA (OBSTRUCTIVE SLEEP APNEA): ICD-10-CM

## 2025-07-03 DIAGNOSIS — E78.2 MIXED HYPERLIPIDEMIA: ICD-10-CM

## 2025-07-03 DIAGNOSIS — E78.5 HYPERLIPIDEMIA, UNSPECIFIED HYPERLIPIDEMIA TYPE: ICD-10-CM

## 2025-07-03 RX ORDER — TIRZEPATIDE 10 MG/.5ML
INJECTION, SOLUTION SUBCUTANEOUS
Qty: 2 ML | Refills: 0 | OUTPATIENT
Start: 2025-07-03

## 2025-07-03 RX ORDER — TIRZEPATIDE 12.5 MG/.5ML
INJECTION, SOLUTION SUBCUTANEOUS
Qty: 2 ML | Refills: 0 | OUTPATIENT
Start: 2025-07-03

## 2025-07-03 NOTE — TELEPHONE ENCOUNTER
Rx Refill Note  Requested Prescriptions     Pending Prescriptions Disp Refills    Zepbound 10 MG/0.5ML solution auto-injector [Pharmacy Med Name: ZEPBOUND 10 MG/0.5 ML PEN] 2 mL 0     Sig: INJECT 10 MG UNDER THE SKIN ONCE WEEKLY    Zepbound 12.5 MG/0.5ML solution auto-injector [Pharmacy Med Name: ZEPBOUND 12.5 MG/0.5 ML PEN] 2 mL 0     Sig: INJECT 12.5 MG UNDER THE SKIN ONCE WEEKLY      Last office visit with prescribing clinician: 6/26/2025   Last telemedicine visit with prescribing clinician: Visit date not found   Next office visit with prescribing clinician: 9/25/2025                         Would you like a call back once the refill request has been completed: [] Yes [] No    If the office needs to give you a call back, can they leave a voicemail: [] Yes [] No    Román Chance CMA/LMR  07/03/25, 07:43 EDT

## 2025-07-26 DIAGNOSIS — E78.5 HYPERLIPIDEMIA, UNSPECIFIED HYPERLIPIDEMIA TYPE: ICD-10-CM

## 2025-07-28 RX ORDER — EZETIMIBE 10 MG/1
10 TABLET ORAL DAILY
Qty: 90 TABLET | Refills: 0 | Status: SHIPPED | OUTPATIENT
Start: 2025-07-28

## 2025-07-31 DIAGNOSIS — E78.2 MIXED HYPERLIPIDEMIA: ICD-10-CM

## 2025-07-31 RX ORDER — PRAVASTATIN SODIUM 20 MG
20 TABLET ORAL DAILY
Qty: 90 TABLET | Refills: 0 | Status: SHIPPED | OUTPATIENT
Start: 2025-07-31

## (undated) DEVICE — IRRIGATOR BULB ASEPTO 60CC STRL

## (undated) DEVICE — SPNG GZ WOVN 4X4IN 12PLY 10/BX STRL

## (undated) DEVICE — BANDAGE,GAUZE,BULKEE II,4.5"X4.1YD,STRL: Brand: MEDLINE

## (undated) DEVICE — CANN O2 ETCO2 FITS ALL CONN CO2 SMPL A/ 7IN DISP LF

## (undated) DEVICE — JELLY,LUBE,STERILE,FLIP TOP,TUBE,4-OZ: Brand: MEDLINE

## (undated) DEVICE — SENSR O2 OXIMAX FNGR A/ 18IN NONSTR

## (undated) DEVICE — LN SMPL CO2 SHTRM SD STREAM W/M LUER

## (undated) DEVICE — GLV SURG BIOGEL LTX PF 7 1/2

## (undated) DEVICE — KT ORCA ORCAPOD DISP STRL

## (undated) DEVICE — GOWN SURG AERO CHROME XL

## (undated) DEVICE — SPNG LAP 18X18IN LF STRL PK/5

## (undated) DEVICE — ADAPT CLN BIOGUARD AIR/H2O DISP

## (undated) DEVICE — SINGLE-USE BIOPSY FORCEPS: Brand: RADIAL JAW 4

## (undated) DEVICE — PREMIUM WET SKIN PREP TRAY: Brand: MEDLINE INDUSTRIES, INC.

## (undated) DEVICE — THE TORRENT IRRIGATION SCOPE CONNECTOR IS USED WITH THE TORRENT IRRIGATION TUBING TO PROVIDE IRRIGATION FLUIDS SUCH AS STERILE WATER DURING GASTROINTESTINAL ENDOSCOPIC PROCEDURES WHEN USED IN CONJUNCTION WITH AN IRRIGATION PUMP (OR ELECTROSURGICAL UNIT).: Brand: TORRENT

## (undated) DEVICE — DRSNG SURESITE WNDW 4X4.5

## (undated) DEVICE — PANTY KNIT MATERN L/XL

## (undated) DEVICE — TUBING, SUCTION, 1/4" X 10', STRAIGHT: Brand: MEDLINE

## (undated) DEVICE — NDL HYPO PRECISIONGLIDE REG 25G 1 1/2

## (undated) DEVICE — LOU MINOR PROCEDURE: Brand: MEDLINE INDUSTRIES, INC.

## (undated) DEVICE — GAUZE,PACKING STRIP,IODOFORM,1/2"X5YD,ST: Brand: CURAD